# Patient Record
Sex: MALE | Race: WHITE | NOT HISPANIC OR LATINO | Employment: OTHER | URBAN - METROPOLITAN AREA
[De-identification: names, ages, dates, MRNs, and addresses within clinical notes are randomized per-mention and may not be internally consistent; named-entity substitution may affect disease eponyms.]

---

## 2020-07-13 ENCOUNTER — APPOINTMENT (OUTPATIENT)
Dept: ULTRASOUND IMAGING | Facility: HOSPITAL | Age: 80
End: 2020-07-13

## 2021-02-08 ENCOUNTER — HOSPITAL ENCOUNTER (OUTPATIENT)
Dept: VACCINE CLINIC | Facility: HOSPITAL | Age: 81
Discharge: HOME | End: 2021-02-08
Attending: INTERNAL MEDICINE

## 2021-03-11 ENCOUNTER — HOSPITAL ENCOUNTER (OUTPATIENT)
Dept: VACCINE CLINIC | Facility: HOSPITAL | Age: 81
Discharge: HOME | End: 2021-03-11
Attending: INTERNAL MEDICINE

## 2021-06-02 ENCOUNTER — HOSPITAL ENCOUNTER (OUTPATIENT)
Dept: OTHER | Facility: HOSPITAL | Age: 81
Discharge: HOME | End: 2021-06-02

## 2021-07-14 ENCOUNTER — HOSPITAL ENCOUNTER (EMERGENCY)
Facility: HOSPITAL | Age: 81
Discharge: HOME | End: 2021-07-14
Attending: EMERGENCY MEDICINE | Admitting: EMERGENCY MEDICINE

## 2021-07-14 VITALS
TEMPERATURE: 97.7 F | OXYGEN SATURATION: 99 % | HEIGHT: 64 IN | HEART RATE: 70 BPM | WEIGHT: 149.91 LBS | BODY MASS INDEX: 25.59 KG/M2 | DIASTOLIC BLOOD PRESSURE: 52 MMHG | RESPIRATION RATE: 20 BRPM | SYSTOLIC BLOOD PRESSURE: 149 MMHG

## 2021-07-14 DIAGNOSIS — E83.42 HYPOMAGNESEMIA: Primary | ICD-10-CM

## 2021-07-14 LAB
ALBUMIN SERPL-MCNC: 4.1 G/DL (ref 3.5–5.2)
ALBUMIN/GLOB SERPL: 1.7 G/DL
ALP SERPL-CCNC: 91 U/L (ref 39–117)
ALT SERPL W P-5'-P-CCNC: 14 U/L (ref 1–41)
ANION GAP SERPL CALCULATED.3IONS-SCNC: 12.6 MMOL/L (ref 5–15)
AST SERPL-CCNC: 15 U/L (ref 1–40)
BASOPHILS # BLD AUTO: 0.07 10*3/MM3 (ref 0–0.2)
BASOPHILS NFR BLD AUTO: 0.6 % (ref 0–1.5)
BILIRUB SERPL-MCNC: 0.3 MG/DL (ref 0–1.2)
BUN SERPL-MCNC: 30 MG/DL (ref 8–23)
BUN/CREAT SERPL: 11.2 (ref 7–25)
CALCIUM SPEC-SCNC: 8.5 MG/DL (ref 8.6–10.5)
CHLORIDE SERPL-SCNC: 105 MMOL/L (ref 98–107)
CO2 SERPL-SCNC: 23.4 MMOL/L (ref 22–29)
CREAT SERPL-MCNC: 2.68 MG/DL (ref 0.76–1.27)
DEPRECATED RDW RBC AUTO: 44 FL (ref 37–54)
EOSINOPHIL # BLD AUTO: 0.44 10*3/MM3 (ref 0–0.4)
EOSINOPHIL NFR BLD AUTO: 4 % (ref 0.3–6.2)
ERYTHROCYTE [DISTWIDTH] IN BLOOD BY AUTOMATED COUNT: 12.8 % (ref 12.3–15.4)
GFR SERPL CREATININE-BSD FRML MDRD: 23 ML/MIN/1.73
GLOBULIN UR ELPH-MCNC: 2.4 GM/DL
GLUCOSE SERPL-MCNC: 97 MG/DL (ref 65–99)
HCT VFR BLD AUTO: 39.1 % (ref 37.5–51)
HGB BLD-MCNC: 12.8 G/DL (ref 13–17.7)
HOLD SPECIMEN: NORMAL
HOLD SPECIMEN: NORMAL
IMM GRANULOCYTES # BLD AUTO: 0.06 10*3/MM3 (ref 0–0.05)
IMM GRANULOCYTES NFR BLD AUTO: 0.5 % (ref 0–0.5)
LYMPHOCYTES # BLD AUTO: 1.59 10*3/MM3 (ref 0.7–3.1)
LYMPHOCYTES NFR BLD AUTO: 14.5 % (ref 19.6–45.3)
MAGNESIUM SERPL-MCNC: 1.1 MG/DL (ref 1.6–2.4)
MCH RBC QN AUTO: 31.1 PG (ref 26.6–33)
MCHC RBC AUTO-ENTMCNC: 32.7 G/DL (ref 31.5–35.7)
MCV RBC AUTO: 94.9 FL (ref 79–97)
MONOCYTES # BLD AUTO: 0.77 10*3/MM3 (ref 0.1–0.9)
MONOCYTES NFR BLD AUTO: 7 % (ref 5–12)
NEUTROPHILS NFR BLD AUTO: 73.4 % (ref 42.7–76)
NEUTROPHILS NFR BLD AUTO: 8.03 10*3/MM3 (ref 1.7–7)
NRBC BLD AUTO-RTO: 0 /100 WBC (ref 0–0.2)
PLATELET # BLD AUTO: 330 10*3/MM3 (ref 140–450)
PMV BLD AUTO: 9.7 FL (ref 6–12)
POTASSIUM SERPL-SCNC: 4.4 MMOL/L (ref 3.5–5.2)
PROT SERPL-MCNC: 6.5 G/DL (ref 6–8.5)
QT INTERVAL: 429 MS
RBC # BLD AUTO: 4.12 10*6/MM3 (ref 4.14–5.8)
SODIUM SERPL-SCNC: 141 MMOL/L (ref 136–145)
WBC # BLD AUTO: 10.96 10*3/MM3 (ref 3.4–10.8)
WHOLE BLOOD HOLD SPECIMEN: NORMAL

## 2021-07-14 PROCEDURE — 36415 COLL VENOUS BLD VENIPUNCTURE: CPT

## 2021-07-14 PROCEDURE — 83735 ASSAY OF MAGNESIUM: CPT | Performed by: EMERGENCY MEDICINE

## 2021-07-14 PROCEDURE — 85025 COMPLETE CBC W/AUTO DIFF WBC: CPT

## 2021-07-14 PROCEDURE — 93005 ELECTROCARDIOGRAM TRACING: CPT

## 2021-07-14 PROCEDURE — 99283 EMERGENCY DEPT VISIT LOW MDM: CPT

## 2021-07-14 PROCEDURE — 25010000002 MAGNESIUM SULFATE IN D5W 1G/100ML (PREMIX) 1-5 GM/100ML-% SOLUTION: Performed by: EMERGENCY MEDICINE

## 2021-07-14 PROCEDURE — 80053 COMPREHEN METABOLIC PANEL: CPT

## 2021-07-14 PROCEDURE — 93010 ELECTROCARDIOGRAM REPORT: CPT | Performed by: SPECIALIST

## 2021-07-14 PROCEDURE — 96365 THER/PROPH/DIAG IV INF INIT: CPT

## 2021-07-14 RX ORDER — MAGNESIUM OXIDE 400 MG/1
400 TABLET ORAL 2 TIMES DAILY
Qty: 30 TABLET | Refills: 0 | Status: SHIPPED | OUTPATIENT
Start: 2021-07-14 | End: 2022-05-02 | Stop reason: SDUPTHER

## 2021-07-14 RX ORDER — OMEPRAZOLE 40 MG/1
40 CAPSULE, DELAYED RELEASE ORAL DAILY
COMMUNITY
Start: 2021-07-02

## 2021-07-14 RX ORDER — MAGNESIUM SULFATE 1 G/100ML
1 INJECTION INTRAVENOUS
Status: COMPLETED | OUTPATIENT
Start: 2021-07-14 | End: 2021-07-14

## 2021-07-14 RX ORDER — LISINOPRIL AND HYDROCHLOROTHIAZIDE 20; 12.5 MG/1; MG/1
1 TABLET ORAL DAILY
COMMUNITY

## 2021-07-14 RX ADMIN — MAGNESIUM SULFATE 1 G: 1 INJECTION INTRAVENOUS at 12:40

## 2021-07-14 RX ADMIN — MAGNESIUM SULFATE 1 G: 1 INJECTION INTRAVENOUS at 11:45

## 2021-07-14 NOTE — DISCHARGE INSTRUCTIONS
All of your blood work today looked okay and showed her usual renal function, but your magnesium levels were quite low measuring only 1.1.    You were given 2 bags of IV magnesium sulfate in the ED today.    You still need to take oral magnesium oxide pills twice a day and call your doctors office for a follow-up appointment to have your magnesium level rechecked within the next week or so.

## 2021-07-14 NOTE — ED PROVIDER NOTES
"Time: 10:50 AM EDT  Arrived by: Car  Chief Complaint: Abnormal labs  History provided by: Patient and family  History is limited by: N/A    History of Present Illness:    Huy Reddy is a 80 y.o. male who presents to the emergency department today with complaints of low magnesium. The patient's family reports that his magnesium has been progressively dropping over the past 3-4 weeks. They state that he received blood work yesterday and received a call from his provider (\"Gi\") that he needed to come to the ED due to low magnesium.    The patient's family notes that he does have generalized weakness and ambulates with a walker. The patient denies any muscle cramping, fever, chest pain, shortness of breath, or urinary problems, though he does have mild chronic diarrhea. He has a history of chronic kidney disease and hypertension. He denies drinking excessive amounts of alcohol daily but does drink occasionally. He denies being on a diuretic. There are no other acute complaints at this time.      History provided by:  Patient and relative   used: No    Abnormal Lab  Location:  N/A  Quality:  Low magnesium  Severity:  Moderate  Onset quality:  Sudden  Duration:  4 weeks  Timing:  Constant  Progression:  Worsening  Chronicity:  Recurrent  Context:  Patient's magnesium has been dropping over the past 3-4 weeks.  Relieved by:  Nothing  Worsened by:  Nothing  Ineffective treatments:  N/A  Associated symptoms: diarrhea (chronic)    Associated symptoms: no chest pain, no cough, no fever, no myalgias, no rash, no shortness of breath and no vomiting    Risk factors:  History of hypertension and chronic kidney disease.          Similar Symptoms Previously: Yes (over the past few months)  Recently seen: Patient has been seen for labs multiple times over the past several months. He has not been seen in this Ed previously.      Patient Care Team  Primary Care Provider: \"Gi\" (patient cannot recall last " "name)    Past Medical History:     Allergies   Allergen Reactions   • Aspirin Irritability      thins blood     Past Medical History:   Diagnosis Date   • Hyperlipidemia    • Hypertension    • Renal disorder      Past Surgical History:   Procedure Laterality Date   • TONSILLECTOMY       History reviewed. No pertinent family history.    Home Medications:  Prior to Admission medications    Not on File        Social History:   PT  reports that he quit smoking about 2 years ago. He does not have any smokeless tobacco history on file. He reports current alcohol use. He reports that he does not use drugs.    Record Review:  I have reviewed the patient's records in [facility].     Review of Systems  Review of Systems   Constitutional: Negative for chills and fever.   HENT: Negative for nosebleeds.    Eyes: Negative for redness.   Respiratory: Negative for cough and shortness of breath.    Cardiovascular: Negative for chest pain.   Gastrointestinal: Positive for diarrhea (chronic). Negative for vomiting.   Genitourinary: Negative for difficulty urinating, dysuria, frequency and hematuria.   Musculoskeletal: Negative for back pain, myalgias and neck pain.   Skin: Negative for rash.   Neurological: Positive for weakness (generalized; patient ambulates with a walker secondary to his weakness). Negative for seizures.   All other systems reviewed and are negative.       Physical Exam  /52   Pulse 70   Temp 97.7 °F (36.5 °C) (Oral)   Resp 20   Ht 162.6 cm (64\")   Wt 68 kg (149 lb 14.6 oz)   SpO2 99%   BMI 25.73 kg/m²     Physical Exam  Vitals and nursing note reviewed.   Constitutional:       General: He is not in acute distress.  HENT:      Head: Normocephalic and atraumatic.      Nose: Nose normal.      Mouth/Throat:      Mouth: Mucous membranes are moist.   Eyes:      General: No scleral icterus.  Cardiovascular:      Rate and Rhythm: Normal rate and regular rhythm.      Heart sounds: Normal heart sounds. No murmur " "heard.     Pulmonary:      Effort: No respiratory distress.      Breath sounds: Normal breath sounds.   Abdominal:      Palpations: Abdomen is soft.      Tenderness: There is no abdominal tenderness.      Hernia: No hernia is present.   Musculoskeletal:         General: No tenderness. Normal range of motion.      Cervical back: Normal range of motion and neck supple. No tenderness.      Right lower leg: No edema.      Left lower leg: No edema.   Skin:     General: Skin is warm and dry.   Neurological:      General: No focal deficit present.      Mental Status: He is alert.      Sensory: No sensory deficit.      Motor: No weakness.   Psychiatric:         Behavior: Behavior normal.                  ED Course  /52   Pulse 70   Temp 97.7 °F (36.5 °C) (Oral)   Resp 20   Ht 162.6 cm (64\")   Wt 68 kg (149 lb 14.6 oz)   SpO2 99%   BMI 25.73 kg/m²   Results for orders placed or performed during the hospital encounter of 07/14/21   Comprehensive Metabolic Panel    Specimen: Blood   Result Value Ref Range    Glucose 97 65 - 99 mg/dL    BUN 30 (H) 8 - 23 mg/dL    Creatinine 2.68 (H) 0.76 - 1.27 mg/dL    Sodium 141 136 - 145 mmol/L    Potassium 4.4 3.5 - 5.2 mmol/L    Chloride 105 98 - 107 mmol/L    CO2 23.4 22.0 - 29.0 mmol/L    Calcium 8.5 (L) 8.6 - 10.5 mg/dL    Total Protein 6.5 6.0 - 8.5 g/dL    Albumin 4.10 3.50 - 5.20 g/dL    ALT (SGPT) 14 1 - 41 U/L    AST (SGOT) 15 1 - 40 U/L    Alkaline Phosphatase 91 39 - 117 U/L    Total Bilirubin 0.3 0.0 - 1.2 mg/dL    eGFR Non African Amer 23 (L) >60 mL/min/1.73    Globulin 2.4 gm/dL    A/G Ratio 1.7 g/dL    BUN/Creatinine Ratio 11.2 7.0 - 25.0    Anion Gap 12.6 5.0 - 15.0 mmol/L   CBC Auto Differential    Specimen: Blood   Result Value Ref Range    WBC 10.96 (H) 3.40 - 10.80 10*3/mm3    RBC 4.12 (L) 4.14 - 5.80 10*6/mm3    Hemoglobin 12.8 (L) 13.0 - 17.7 g/dL    Hematocrit 39.1 37.5 - 51.0 %    MCV 94.9 79.0 - 97.0 fL    MCH 31.1 26.6 - 33.0 pg    MCHC 32.7 31.5 - " 35.7 g/dL    RDW 12.8 12.3 - 15.4 %    RDW-SD 44.0 37.0 - 54.0 fl    MPV 9.7 6.0 - 12.0 fL    Platelets 330 140 - 450 10*3/mm3    Neutrophil % 73.4 42.7 - 76.0 %    Lymphocyte % 14.5 (L) 19.6 - 45.3 %    Monocyte % 7.0 5.0 - 12.0 %    Eosinophil % 4.0 0.3 - 6.2 %    Basophil % 0.6 0.0 - 1.5 %    Immature Grans % 0.5 0.0 - 0.5 %    Neutrophils, Absolute 8.03 (H) 1.70 - 7.00 10*3/mm3    Lymphocytes, Absolute 1.59 0.70 - 3.10 10*3/mm3    Monocytes, Absolute 0.77 0.10 - 0.90 10*3/mm3    Eosinophils, Absolute 0.44 (H) 0.00 - 0.40 10*3/mm3    Basophils, Absolute 0.07 0.00 - 0.20 10*3/mm3    Immature Grans, Absolute 0.06 (H) 0.00 - 0.05 10*3/mm3    nRBC 0.0 0.0 - 0.2 /100 WBC   Magnesium    Specimen: Blood   Result Value Ref Range    Magnesium 1.1 (L) 1.6 - 2.4 mg/dL   ECG 12 Lead   Result Value Ref Range    QT Interval 429 ms   Green Top (Gel)   Result Value Ref Range    Extra Tube Hold for add-ons.    Lavender Top   Result Value Ref Range    Extra Tube hold for add-on    Gold Top - SST   Result Value Ref Range    Extra Tube Hold for add-ons.      Medications   magnesium sulfate in D5W 1g/100mL (PREMIX) (0 g Intravenous Stopped 7/14/21 1358)     No results found.    Procedures/EKGs:  Procedures    EKG:    Rhythm: Normal sinus rhythm  Rate: 63  Normal intervals.  QRS: Q waves present in leads II, III, and aVF.  ST Segment: Normal    No acute ischemia.    EKG Comparison: No old for comparison.    Interpreted by me.          Medical Decision Making:                     [facility]     This patient is a pleasant healthy-appearing 80-year-old male who was sent by his doctor's office today for abnormally low magnesium levels based off of blood work drawn a couple days ago at his doctor's office.    He does state he has generalized muscular weakness but not having any muscle spasms or cramps lately, and also denies any palpitations.    He denies any new vomiting or diarrhea or any causes for dehydration and denies any new medications  lately.    His magnesium level is low at measuring 1.1 today.    I started him on a couple doses of IV magnesium sulfate here in the ED.    I observed him on cardiac monitoring and twelve-lead EKG shows no obvious signs of electrolyte abnormality.    He has stable vital signs and looks clinically stable to be discharged home at this time and I will start him on oral magnesium oxide 400 mg twice daily and have him follow-up with his PCP for serum magnesium level recheck in the next week.          Final diagnoses:   Hypomagnesemia        Disposition:  ED Disposition     ED Disposition Condition Comment    Discharge Stable           Documentation assistance provided by Sahara Drew acting as scribe for No att. providers found. Information recorded by the scribe was done at my direction and has been verified and validated by me.        Sahara Drew  07/14/21 1059       Sahara Drew  07/14/21 1105       Sahara Drew  07/14/21 1140       Sahara Drew  07/14/21 140       Willy Shah MD  07/14/21 4411

## 2021-07-14 NOTE — ED NOTES
Provided drinks to patient and wife. Updated with amount of time mag takes to infuse.       Valentine Gonzales RN  07/14/21 5838

## 2021-07-27 ENCOUNTER — TRANSCRIBE ORDERS (OUTPATIENT)
Dept: ADMINISTRATIVE | Facility: HOSPITAL | Age: 81
End: 2021-07-27

## 2021-07-27 DIAGNOSIS — I73.9 INTERMITTENT CLAUDICATION (HCC): ICD-10-CM

## 2021-07-27 DIAGNOSIS — I73.9 PERIPHERAL VASCULAR DISEASE, UNSPECIFIED (HCC): Primary | ICD-10-CM

## 2021-07-27 DIAGNOSIS — I73.9 PERIPHERAL VASCULAR DISEASE (HCC): ICD-10-CM

## 2022-04-14 ENCOUNTER — APPOINTMENT (OUTPATIENT)
Dept: CT IMAGING | Facility: HOSPITAL | Age: 82
End: 2022-04-14

## 2022-04-14 ENCOUNTER — HOSPITAL ENCOUNTER (EMERGENCY)
Facility: HOSPITAL | Age: 82
Discharge: HOME | End: 2022-04-14
Attending: EMERGENCY MEDICINE | Admitting: EMERGENCY MEDICINE

## 2022-04-14 ENCOUNTER — APPOINTMENT (OUTPATIENT)
Dept: GENERAL RADIOLOGY | Facility: HOSPITAL | Age: 82
End: 2022-04-14

## 2022-04-14 VITALS
WEIGHT: 150 LBS | TEMPERATURE: 98.4 F | DIASTOLIC BLOOD PRESSURE: 52 MMHG | HEART RATE: 52 BPM | SYSTOLIC BLOOD PRESSURE: 142 MMHG | OXYGEN SATURATION: 100 % | RESPIRATION RATE: 16 BRPM | BODY MASS INDEX: 24.11 KG/M2 | HEIGHT: 66 IN

## 2022-04-14 DIAGNOSIS — S70.02XA CONTUSION OF LEFT HIP, INITIAL ENCOUNTER: Primary | ICD-10-CM
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
## 2022-04-14 PROCEDURE — 99283 EMERGENCY DEPT VISIT LOW MDM: CPT

## 2022-04-14 PROCEDURE — 73502 X-RAY EXAM HIP UNI 2-3 VIEWS: CPT

## 2022-04-14 PROCEDURE — 72192 CT PELVIS W/O DYE: CPT

## 2022-04-14 RX ORDER — HYDROCODONE BITARTRATE AND ACETAMINOPHEN 5; 325 MG/1; MG/1
1 TABLET ORAL EVERY 6 HOURS PRN
Qty: 15 TABLET | Refills: 0 | Status: SHIPPED | OUTPATIENT
Start: 2022-04-14 | End: 2023-01-18

## 2022-04-14 RX ORDER — HYDROCODONE BITARTRATE AND ACETAMINOPHEN 5; 325 MG/1; MG/1
1 TABLET ORAL EVERY 6 HOURS PRN
Status: DISCONTINUED | OUTPATIENT
Start: 2022-04-14 | End: 2022-04-15 | Stop reason: HOSPADM

## 2022-04-14 RX ADMIN — HYDROCODONE BITARTRATE AND ACETAMINOPHEN 1 TABLET: 5; 325 TABLET ORAL at 19:49

## 2022-04-14 NOTE — ED PROVIDER NOTES
Subjective   Huy Reddy is a 81 y.o. male that presents to the ED via private vehicle accompanied by family for FALL that occurred today around 1430. Pt states that he was breaking wood with his foot when his foot slipped and he fell, landing on his left hip. He c/o moderate pain to the left hip and reports being unable to ambulate and bear weight on the left leg since. No other injuries including head injury are reported. No back pain, neck pain or LOC.     Pt has no other acute complaints at this time.       History provided by:  Patient      Review of Systems   Constitutional: Negative for chills, diaphoresis and fever.   HENT: Negative for ear discharge and nosebleeds.    Eyes: Negative for photophobia.   Respiratory: Negative for shortness of breath.    Cardiovascular: Negative for chest pain.   Gastrointestinal: Negative for diarrhea, nausea and vomiting.   Genitourinary: Negative for dysuria.   Musculoskeletal: Negative for back pain and neck pain.        Left hip pain secondary to fall.    Skin: Negative for rash.   Neurological: Negative for headaches.   All other systems reviewed and are negative.      Past Medical History:   Diagnosis Date   • Hyperlipidemia    • Hypertension    • Renal disorder        Allergies   Allergen Reactions   • Aspirin Irritability      thins blood       Past Surgical History:   Procedure Laterality Date   • TONSILLECTOMY         History reviewed. No pertinent family history.    Social History     Socioeconomic History   • Marital status:    Tobacco Use   • Smoking status: Former Smoker     Quit date: 2019     Years since quittin.7   • Smokeless tobacco: Never Used   Substance and Sexual Activity   • Alcohol use: Yes   • Drug use: Never         Objective   Physical Exam  Vitals and nursing note reviewed.   Constitutional:       General: He is not in acute distress.     Appearance: Normal appearance. He is well-developed.      Comments: Patient appears  well-nourished.     HENT:      Head: Normocephalic and atraumatic.      Right Ear: Tympanic membrane normal. Tympanic membrane is not erythematous.      Left Ear: Tympanic membrane normal. Tympanic membrane is not erythematous.      Nose: Nose normal.      Right Nostril: No epistaxis.      Left Nostril: No epistaxis.      Comments: No evidence of trauma.      Mouth/Throat:      Mouth: Mucous membranes are moist.      Pharynx: Oropharynx is clear. Uvula midline. No posterior oropharyngeal erythema.   Eyes:      Extraocular Movements: Extraocular movements intact.      Conjunctiva/sclera: Conjunctivae normal.      Pupils: Pupils are equal, round, and reactive to light.   Neck:      Comments: Trachea midline. No meningeal signs.   Cardiovascular:      Rate and Rhythm: Normal rate and regular rhythm.      Pulses: Normal pulses.      Heart sounds: Normal heart sounds. No murmur heard.    No friction rub. No gallop.   Pulmonary:      Effort: Pulmonary effort is normal. No respiratory distress.      Breath sounds: Normal breath sounds.   Abdominal:      General: Bowel sounds are normal.      Palpations: Abdomen is soft. There is no mass.      Tenderness: There is no abdominal tenderness. There is no right CVA tenderness, left CVA tenderness, guarding or rebound.      Comments: There is no rigidity. There are no pulsatile masses.     Musculoskeletal:      Cervical back: Neck supple.      Comments: Back - Spine is straight and midline.      Tenderness with rotation of LLE and palpation of left lateral hip joint. In tact pulses distally. No ecchymosis.     Pt does have a small wound that is 1 cm in diameter to the mid thoracic area from where he states he used a heating pad with no drainage or cellulitis.    Lymphadenopathy:      Cervical: No cervical adenopathy.   Skin:     General: Skin is warm and dry.      Findings: No petechiae or rash.      Comments: There are no purpura lesions noted.  There are no vesicular lesions  noted.   Neurological:      General: No focal deficit present.      Mental Status: He is alert and oriented to person, place, and time.      Cranial Nerves: Cranial nerves are intact.      Sensory: Sensation is intact. No sensory deficit.      Motor: Motor function is intact. No weakness.      Comments: Cerebellar function was normal.         Procedures         ED Course        The patient was seen and evaluated in the ED by me.  The above history and physical examination was performed as document.  Diagnostic data was obtained.  Results reviewed.  Discussed with the patient and his family.  Both the x-ray and CT scans do not show any evidence of acute fractures.  Patient most likely has contusion of the left hip but also explained I cannot rule out a muscle tear or injury.  Patient stable for discharge home.  I will send him home with a prescription of hydrocodone to take sparingly as needed for pain.  I have also contacted our  to arrange for home health/physical therapy evaluation.                                    MDM    Final diagnoses:   Contusion of left hip, initial encounter   Fall, initial encounter       Documentation assistance provided by carole Luna.  Information recorded by the carole was done at my direction and has been verified and validated by me.     Priya Luna  04/14/22 1927       Priya Luna  04/14/22 1950       Tesfaye Bowens DO  04/14/22 [id number]

## 2022-04-15 NOTE — DISCHARGE INSTRUCTIONS
Activity as tolerated.  Use assistance with ambulation.  Take the pain medication as prescribed but use sparingly.  Also recommend taking a stool softener if you are taking the prescription pain medication.  Otherwise you can use Tylenol or Motrin for pain.  Cold compresses to affected area.  Apply for 20 to 30 minutes 3-5 times per day.  Do not apply ice directly to the skin.   will arrange for in-home health PT evaluation.  Follow your primary care provider in 1 week.  Return to the ER for any change or worsening pain or any other concerns issues that may arise.

## 2022-05-02 ENCOUNTER — HOSPITAL ENCOUNTER (EMERGENCY)
Facility: HOSPITAL | Age: 82
Discharge: HOME | End: 2022-05-02
Attending: EMERGENCY MEDICINE | Admitting: EMERGENCY MEDICINE

## 2022-05-02 VITALS
OXYGEN SATURATION: 99 % | HEART RATE: 62 BPM | WEIGHT: 152.34 LBS | SYSTOLIC BLOOD PRESSURE: 164 MMHG | HEIGHT: 65 IN | BODY MASS INDEX: 25.38 KG/M2 | DIASTOLIC BLOOD PRESSURE: 58 MMHG | RESPIRATION RATE: 20 BRPM | TEMPERATURE: 97.5 F

## 2022-05-02 DIAGNOSIS — E83.42 HYPOMAGNESEMIA: ICD-10-CM

## 2022-05-02 DIAGNOSIS — N18.5 STAGE 5 CHRONIC KIDNEY DISEASE NOT ON CHRONIC DIALYSIS: Primary | ICD-10-CM

## 2022-05-02 LAB
ALBUMIN SERPL-MCNC: 4 G/DL (ref 3.5–5.2)
ALBUMIN/GLOB SERPL: 1.7 G/DL
ALP SERPL-CCNC: 93 U/L (ref 39–117)
ALT SERPL W P-5'-P-CCNC: 33 U/L (ref 1–41)
ANION GAP SERPL CALCULATED.3IONS-SCNC: 13.9 MMOL/L (ref 5–15)
AST SERPL-CCNC: 18 U/L (ref 1–40)
BACTERIA UR QL AUTO: ABNORMAL /HPF
BASOPHILS # BLD AUTO: 0.05 10*3/MM3 (ref 0–0.2)
BASOPHILS NFR BLD AUTO: 0.3 % (ref 0–1.5)
BILIRUB SERPL-MCNC: 0.2 MG/DL (ref 0–1.2)
BILIRUB UR QL STRIP: NEGATIVE
BUN SERPL-MCNC: 56 MG/DL (ref 8–23)
BUN/CREAT SERPL: 20.2 (ref 7–25)
CALCIUM SPEC-SCNC: 8.6 MG/DL (ref 8.6–10.5)
CHLORIDE SERPL-SCNC: 104 MMOL/L (ref 98–107)
CLARITY UR: CLEAR
CO2 SERPL-SCNC: 23.1 MMOL/L (ref 22–29)
COLOR UR: YELLOW
CREAT SERPL-MCNC: 2.77 MG/DL (ref 0.76–1.27)
DEPRECATED RDW RBC AUTO: 44.9 FL (ref 37–54)
EGFRCR SERPLBLD CKD-EPI 2021: 22.4 ML/MIN/1.73
EOSINOPHIL # BLD AUTO: 0 10*3/MM3 (ref 0–0.4)
EOSINOPHIL NFR BLD AUTO: 0 % (ref 0.3–6.2)
ERYTHROCYTE [DISTWIDTH] IN BLOOD BY AUTOMATED COUNT: 12.9 % (ref 12.3–15.4)
GLOBULIN UR ELPH-MCNC: 2.4 GM/DL
GLUCOSE SERPL-MCNC: 129 MG/DL (ref 65–99)
GLUCOSE UR STRIP-MCNC: NEGATIVE MG/DL
HCT VFR BLD AUTO: 38.2 % (ref 37.5–51)
HGB BLD-MCNC: 12.4 G/DL (ref 13–17.7)
HGB UR QL STRIP.AUTO: NEGATIVE
HYALINE CASTS UR QL AUTO: ABNORMAL /LPF
IMM GRANULOCYTES # BLD AUTO: 0.3 10*3/MM3 (ref 0–0.05)
IMM GRANULOCYTES NFR BLD AUTO: 1.5 % (ref 0–0.5)
KETONES UR QL STRIP: NEGATIVE
LEUKOCYTE ESTERASE UR QL STRIP.AUTO: NEGATIVE
LYMPHOCYTES # BLD AUTO: 0.96 10*3/MM3 (ref 0.7–3.1)
LYMPHOCYTES NFR BLD AUTO: 4.9 % (ref 19.6–45.3)
MAGNESIUM SERPL-MCNC: 1.3 MG/DL (ref 1.6–2.4)
MCH RBC QN AUTO: 30.7 PG (ref 26.6–33)
MCHC RBC AUTO-ENTMCNC: 32.5 G/DL (ref 31.5–35.7)
MCV RBC AUTO: 94.6 FL (ref 79–97)
MONOCYTES # BLD AUTO: 1.36 10*3/MM3 (ref 0.1–0.9)
MONOCYTES NFR BLD AUTO: 6.9 % (ref 5–12)
NEUTROPHILS NFR BLD AUTO: 16.9 10*3/MM3 (ref 1.7–7)
NEUTROPHILS NFR BLD AUTO: 86.4 % (ref 42.7–76)
NITRITE UR QL STRIP: NEGATIVE
NRBC BLD AUTO-RTO: 0 /100 WBC (ref 0–0.2)
PH UR STRIP.AUTO: 5.5 [PH] (ref 5–8)
PLATELET # BLD AUTO: 343 10*3/MM3 (ref 140–450)
PMV BLD AUTO: 10.3 FL (ref 6–12)
POTASSIUM SERPL-SCNC: 4.4 MMOL/L (ref 3.5–5.2)
PROT SERPL-MCNC: 6.4 G/DL (ref 6–8.5)
PROT UR QL STRIP: ABNORMAL
RBC # BLD AUTO: 4.04 10*6/MM3 (ref 4.14–5.8)
RBC # UR STRIP: ABNORMAL /HPF
REF LAB TEST METHOD: ABNORMAL
SODIUM SERPL-SCNC: 141 MMOL/L (ref 136–145)
SP GR UR STRIP: 1.01 (ref 1–1.03)
SQUAMOUS #/AREA URNS HPF: ABNORMAL /HPF
UROBILINOGEN UR QL STRIP: ABNORMAL
WBC # UR STRIP: ABNORMAL /HPF
WBC NRBC COR # BLD: 19.57 10*3/MM3 (ref 3.4–10.8)

## 2022-05-02 PROCEDURE — 99283 EMERGENCY DEPT VISIT LOW MDM: CPT

## 2022-05-02 PROCEDURE — 85025 COMPLETE CBC W/AUTO DIFF WBC: CPT

## 2022-05-02 PROCEDURE — 83735 ASSAY OF MAGNESIUM: CPT | Performed by: EMERGENCY MEDICINE

## 2022-05-02 PROCEDURE — 81001 URINALYSIS AUTO W/SCOPE: CPT | Performed by: EMERGENCY MEDICINE

## 2022-05-02 PROCEDURE — 36415 COLL VENOUS BLD VENIPUNCTURE: CPT

## 2022-05-02 PROCEDURE — 80053 COMPREHEN METABOLIC PANEL: CPT

## 2022-05-02 PROCEDURE — 51798 US URINE CAPACITY MEASURE: CPT

## 2022-05-02 RX ORDER — MAGNESIUM OXIDE 400 MG/1
400 TABLET ORAL 2 TIMES DAILY
Qty: 20 TABLET | Refills: 0 | Status: SHIPPED | OUTPATIENT
Start: 2022-05-02 | End: 2023-02-23

## 2022-05-02 RX ORDER — METHYLPREDNISOLONE 4 MG/1
4 TABLET ORAL DAILY
COMMUNITY
End: 2023-01-18

## 2022-05-02 NOTE — ED PROVIDER NOTES
Time: 6:07 PM EDT  Arrived by: private car  Chief Complaint: Abnormal Labs  History provided by: Pt  History is limited by: N/A     History of Present Illness:  Patient is a 81 y.o. male that presents to the emergency department with complaints of abnormal labs today. Pt states that he has 3 times urination in night and today his doctor office ordered labs and after screening his Labs creatinine level are 3.6 mg/dl and doctor office advised him to visit ER for further evaluation. Pt also reports recently he has ankle pain for that he visited to orthopedic office where he started on Prednisolone as advised. Pt has h/o CKD and regular follow up with nephrologist.    Pt denies any fever, flank pain, hematuria, abd pain, diarrhea, vomiting and nausea.           History provided by:  Patient   used: No    Other  Location:  Abnormmal labs   Severity:  Mild  Onset quality:  Sudden  Duration: today.  Timing:  Constant  Progression:  Unchanged  Chronicity:  New  Associated symptoms: no abdominal pain, no chest pain, no diarrhea, no fever, no headaches, no myalgias, no nausea, no rash, no shortness of breath and no vomiting        Similar Symptoms Previously: No  Recently seen: 4/27/2022 with orthopaedic()      Patient Care Team  Primary Care Provider: Allison Villafana MD    Past Medical History:     Allergies   Allergen Reactions   • Aspirin Irritability      thins blood     Past Medical History:   Diagnosis Date   • Hyperlipidemia    • Hypertension    • Renal disorder      Past Surgical History:   Procedure Laterality Date   • TONSILLECTOMY       History reviewed. No pertinent family history.    Home Medications:  Prior to Admission medications    Medication Sig Start Date End Date Taking? Authorizing Provider   HYDROcodone-acetaminophen (NORCO) 5-325 MG per tablet Take 1 tablet by mouth Every 6 (Six) Hours As Needed for Moderate Pain . 4/14/22   Tesfaye Bowens, DO   lisinopril-hydrochlorothiazide  "(PRINZIDE,ZESTORETIC) 20-12.5 MG per tablet Take 1 tablet by mouth Daily.    ProviderCholo MD   magnesium oxide (MAG-OX) 400 MG tablet Take 1 tablet by mouth 2 (Two) Times a Day. 7/14/21   Willy Shah MD   methylPREDNISolone (MEDROL) 4 MG tablet Take 4 mg by mouth Daily.    Cholo Jaime MD   omeprazole (priLOSEC) 20 MG capsule TAKE 1 CAPSULE EVERY DAY DO NOT CRUSH OR CHEW 7/2/21   ProviderCholo MD        Social History:   Social History     Tobacco Use   • Smoking status: Never Smoker   • Smokeless tobacco: Never Used   Vaping Use   • Vaping Use: Never used   Substance Use Topics   • Alcohol use: Not Currently   • Drug use: Never     Recent travel: no     Review of Systems:  Review of Systems   Constitutional: Negative for chills, diaphoresis and fever.   HENT: Negative for ear discharge and nosebleeds.    Eyes: Negative for photophobia.   Respiratory: Negative for shortness of breath.    Cardiovascular: Negative for chest pain.   Gastrointestinal: Negative for abdominal pain, diarrhea, nausea and vomiting.   Genitourinary: Negative for dysuria.   Musculoskeletal: Negative for back pain, myalgias and neck pain.   Skin: Negative for rash.   Neurological: Negative for headaches.        Physical Exam:  /58   Pulse 62   Temp 97.5 °F (36.4 °C) (Oral)   Resp 20   Ht 165.1 cm (65\")   Wt 69.1 kg (152 lb 5.4 oz)   SpO2 99%   BMI 25.35 kg/m²     Physical Exam  Vitals and nursing note reviewed.   Constitutional:       General: He is not in acute distress.  HENT:      Head: Normocephalic and atraumatic.      Nose: Nose normal.      Mouth/Throat:      Mouth: Mucous membranes are moist.   Eyes:      General: No scleral icterus.  Cardiovascular:      Rate and Rhythm: Normal rate and regular rhythm.      Pulses: Normal pulses.      Heart sounds: Normal heart sounds. No murmur heard.  Pulmonary:      Effort: No respiratory distress.      Breath sounds: Normal breath sounds.   Abdominal:      " Palpations: Abdomen is soft.      Tenderness: There is no abdominal tenderness.   Musculoskeletal:         General: Normal range of motion.      Cervical back: Normal range of motion and neck supple.      Right lower leg: No edema.      Left lower leg: No edema.   Skin:     General: Skin is warm and dry.   Neurological:      Mental Status: He is alert. Mental status is at baseline.   Psychiatric:         Behavior: Behavior normal.                Medications in the Emergency Department:  Medications - No data to display     Labs  Lab Results (last 24 hours)     Procedure Component Value Units Date/Time    CBC & Differential [[id number]]  (Abnormal) Collected: 05/02/22 1616    Specimen: Blood Updated: 05/02/22 1703    Narrative:      The following orders were created for panel order CBC & Differential.  Procedure                               Abnormality         Status                     ---------                               -----------         ------                     CBC Auto Differential[[id number]]        Abnormal            Final result                 Please view results for these tests on the individual orders.    Comprehensive Metabolic Panel [[id number]]  (Abnormal) Collected: 05/02/22 1616    Specimen: Blood Updated: 05/02/22 1722     Glucose 129 mg/dL      BUN 56 mg/dL      Creatinine 2.77 mg/dL      Sodium 141 mmol/L      Potassium 4.4 mmol/L      Chloride 104 mmol/L      CO2 23.1 mmol/L      Calcium 8.6 mg/dL      Total Protein 6.4 g/dL      Albumin 4.00 g/dL      ALT (SGPT) 33 U/L      AST (SGOT) 18 U/L      Alkaline Phosphatase 93 U/L      Total Bilirubin 0.2 mg/dL      Globulin 2.4 gm/dL      A/G Ratio 1.7 g/dL      BUN/Creatinine Ratio 20.2     Anion Gap 13.9 mmol/L      eGFR 22.4 mL/min/1.73      Comment: National Kidney Foundation and American Society of Nephrology (ASN) Task Force recommended calculation based on the Chronic Kidney Disease Epidemiology Collaboration (CKD-EPI) equation refit  without adjustment for race.       Narrative:      GFR Normal >60  Chronic Kidney Disease <60  Kidney Failure <15      CBC Auto Differential [[id number]]  (Abnormal) Collected: 05/02/22 1616    Specimen: Blood Updated: 05/02/22 1703     WBC 19.57 10*3/mm3      RBC 4.04 10*6/mm3      Hemoglobin 12.4 g/dL      Hematocrit 38.2 %      MCV 94.6 fL      MCH 30.7 pg      MCHC 32.5 g/dL      RDW 12.9 %      RDW-SD 44.9 fl      MPV 10.3 fL      Platelets 343 10*3/mm3      Neutrophil % 86.4 %      Lymphocyte % 4.9 %      Monocyte % 6.9 %      Eosinophil % 0.0 %      Basophil % 0.3 %      Immature Grans % 1.5 %      Neutrophils, Absolute 16.90 10*3/mm3      Lymphocytes, Absolute 0.96 10*3/mm3      Monocytes, Absolute 1.36 10*3/mm3      Eosinophils, Absolute 0.00 10*3/mm3      Basophils, Absolute 0.05 10*3/mm3      Immature Grans, Absolute 0.30 10*3/mm3      nRBC 0.0 /100 WBC     Magnesium [[id number]]  (Abnormal) Collected: 05/02/22 1616    Specimen: Blood Updated: 05/02/22 1900     Magnesium 1.3 mg/dL     Urinalysis With Microscopic If Indicated (No Culture) - Urine, Clean Catch [[id number]]  (Abnormal) Collected: 05/02/22 1853    Specimen: Urine, Clean Catch Updated: 05/02/22 1906     Color, UA Yellow     Appearance, UA Clear     pH, UA 5.5     Specific Gravity, UA 1.015     Glucose, UA Negative     Ketones, UA Negative     Bilirubin, UA Negative     Blood, UA Negative     Protein, UA 30 mg/dL (1+)     Leuk Esterase, UA Negative     Nitrite, UA Negative     Urobilinogen, UA 0.2 E.U./dL    Urinalysis, Microscopic Only - Urine, Clean Catch [[id number]]  (Abnormal) Collected: 05/02/22 1853    Specimen: Urine, Clean Catch Updated: 05/02/22 1906     RBC, UA 0-2 /HPF      WBC, UA 0-2 /HPF      Bacteria, UA None Seen /HPF      Squamous Epithelial Cells, UA 0-2 /HPF      Hyaline Casts, UA 0-2 /LPF      Methodology Automated Microscopy           Imaging:  No Radiology Exams Resulted Within Past 24  Hours    Procedures:  Procedures    Progress  ED Course as of 05/02/22 2335   Mon May 02, 2022   1910 eGFR(!): 22.4 [VS]      ED Course User Index  [VS] Willy Shah MD                            Medical Decision Making:  MDM         This patient is a pleasant 81-year-old male with history of stage V chronic kidney disease, now presenting after his doctor check some screening lab work as an outpatient and told him to come to the ER for worsening kidney failure.    Essentially his baseline creatinine is 2.7 and it was found to be 3.2 today on the outpatient blood work, but when we repeated it it is still 2.7 in the ER.    Potassium is within normal limits and patient clinically is well-appearing and without complaint.    He is making small amounts of urine here and there is no evidence of UTI.    We did do a bladder scan roughly 100 cc an hour or so after urination, so I do not see any acute urinary retention causing worsening renal function.    I think can be safely discharged home to follow-up as an outpatient with his nephrologist for slightly gradually worsening chronic kidney disease.        Secondly, he has mild hypomagnesemia, and I did offer IV magnesium sulfate but he prefers to be discharged home and take oral magnesium oxide twice daily this week and follow-up with his doctor.      Final diagnoses:   Stage 5 chronic kidney disease not on chronic dialysis (HCC)   Hypomagnesemia        Disposition:  ED Disposition     ED Disposition   Discharge    Condition   Stable    Comment   --             Documentation assistance provided by KETAN WEEMS acting as scribe for No att. providers found. Information recorded by the scribe was done at my direction and has been verified and validated by me.        Ketan Weems  05/02/22 1901       Willy Shah MD  05/02/22 2522

## 2022-05-02 NOTE — DISCHARGE INSTRUCTIONS
Your kidney function today looked the same as it did 9 months ago and no emergency was found.    Your magnesium level is somewhat low measuring 1.3 so you should take the magnesium pills twice a day and follow-up with your doctor.    Please call your kidney doctor for a follow-up to see if you need any further evaluation and management of your kidney disease.

## 2022-12-06 ENCOUNTER — APPOINTMENT (OUTPATIENT)
Dept: GENERAL RADIOLOGY | Facility: HOSPITAL | Age: 82
End: 2022-12-06

## 2022-12-06 ENCOUNTER — HOSPITAL ENCOUNTER (EMERGENCY)
Facility: HOSPITAL | Age: 82
Discharge: HOME | End: 2022-12-07
Attending: EMERGENCY MEDICINE | Admitting: EMERGENCY MEDICINE

## 2022-12-06 DIAGNOSIS — J11.1 INFLUENZA-LIKE ILLNESS: ICD-10-CM

## 2022-12-06 DIAGNOSIS — N18.9 ACUTE ON CHRONIC RENAL INSUFFICIENCY: ICD-10-CM

## 2022-12-06 DIAGNOSIS — E83.42 HYPOMAGNESEMIA: ICD-10-CM

## 2022-12-06 DIAGNOSIS — N28.9 ACUTE ON CHRONIC RENAL INSUFFICIENCY: ICD-10-CM

## 2022-12-06 DIAGNOSIS — R00.2 HEART PALPITATIONS: Primary | ICD-10-CM

## 2022-12-06 LAB
ALBUMIN SERPL-MCNC: 3.9 G/DL (ref 3.5–5.2)
ALBUMIN/GLOB SERPL: 1.3 G/DL
ALP SERPL-CCNC: 86 U/L (ref 39–117)
ALT SERPL W P-5'-P-CCNC: 20 U/L (ref 1–41)
ANION GAP SERPL CALCULATED.3IONS-SCNC: 16.8 MMOL/L (ref 5–15)
AST SERPL-CCNC: 16 U/L (ref 1–40)
BASOPHILS # BLD AUTO: 0.04 10*3/MM3 (ref 0–0.2)
BASOPHILS NFR BLD AUTO: 0.3 % (ref 0–1.5)
BILIRUB SERPL-MCNC: 0.2 MG/DL (ref 0–1.2)
BUN SERPL-MCNC: 61 MG/DL (ref 8–23)
BUN/CREAT SERPL: 18.5 (ref 7–25)
CALCIUM SPEC-SCNC: 8.9 MG/DL (ref 8.6–10.5)
CHLORIDE SERPL-SCNC: 102 MMOL/L (ref 98–107)
CO2 SERPL-SCNC: 21.2 MMOL/L (ref 22–29)
CREAT SERPL-MCNC: 3.3 MG/DL (ref 0.76–1.27)
DEPRECATED RDW RBC AUTO: 43.8 FL (ref 37–54)
EGFRCR SERPLBLD CKD-EPI 2021: 18 ML/MIN/1.73
EOSINOPHIL # BLD AUTO: 0.12 10*3/MM3 (ref 0–0.4)
EOSINOPHIL NFR BLD AUTO: 0.9 % (ref 0.3–6.2)
ERYTHROCYTE [DISTWIDTH] IN BLOOD BY AUTOMATED COUNT: 12.9 % (ref 12.3–15.4)
GLOBULIN UR ELPH-MCNC: 3.1 GM/DL
GLUCOSE SERPL-MCNC: 110 MG/DL (ref 65–99)
HCT VFR BLD AUTO: 37.8 % (ref 37.5–51)
HGB BLD-MCNC: 12.8 G/DL (ref 13–17.7)
HOLD SPECIMEN: NORMAL
HOLD SPECIMEN: NORMAL
IMM GRANULOCYTES # BLD AUTO: 0.12 10*3/MM3 (ref 0–0.05)
IMM GRANULOCYTES NFR BLD AUTO: 0.9 % (ref 0–0.5)
LYMPHOCYTES # BLD AUTO: 1.51 10*3/MM3 (ref 0.7–3.1)
LYMPHOCYTES NFR BLD AUTO: 10.8 % (ref 19.6–45.3)
MAGNESIUM SERPL-MCNC: 1.2 MG/DL (ref 1.6–2.4)
MCH RBC QN AUTO: 31.3 PG (ref 26.6–33)
MCHC RBC AUTO-ENTMCNC: 33.9 G/DL (ref 31.5–35.7)
MCV RBC AUTO: 92.4 FL (ref 79–97)
MONOCYTES # BLD AUTO: 1.16 10*3/MM3 (ref 0.1–0.9)
MONOCYTES NFR BLD AUTO: 8.3 % (ref 5–12)
NEUTROPHILS NFR BLD AUTO: 11 10*3/MM3 (ref 1.7–7)
NEUTROPHILS NFR BLD AUTO: 78.8 % (ref 42.7–76)
NRBC BLD AUTO-RTO: 0 /100 WBC (ref 0–0.2)
PLATELET # BLD AUTO: 328 10*3/MM3 (ref 140–450)
PMV BLD AUTO: 10.2 FL (ref 6–12)
POTASSIUM SERPL-SCNC: 4.5 MMOL/L (ref 3.5–5.2)
PROT SERPL-MCNC: 7 G/DL (ref 6–8.5)
QT INTERVAL: 433 MS
RBC # BLD AUTO: 4.09 10*6/MM3 (ref 4.14–5.8)
SODIUM SERPL-SCNC: 140 MMOL/L (ref 136–145)
TROPONIN T SERPL-MCNC: 0.02 NG/ML (ref 0–0.03)
WBC NRBC COR # BLD: 13.95 10*3/MM3 (ref 3.4–10.8)
WHOLE BLOOD HOLD COAG: NORMAL
WHOLE BLOOD HOLD SPECIMEN: NORMAL

## 2022-12-06 PROCEDURE — 36415 COLL VENOUS BLD VENIPUNCTURE: CPT

## 2022-12-06 PROCEDURE — 93005 ELECTROCARDIOGRAM TRACING: CPT | Performed by: EMERGENCY MEDICINE

## 2022-12-06 PROCEDURE — 71045 X-RAY EXAM CHEST 1 VIEW: CPT

## 2022-12-06 PROCEDURE — 99283 EMERGENCY DEPT VISIT LOW MDM: CPT

## 2022-12-06 PROCEDURE — 96366 THER/PROPH/DIAG IV INF ADDON: CPT

## 2022-12-06 PROCEDURE — 96365 THER/PROPH/DIAG IV INF INIT: CPT

## 2022-12-06 PROCEDURE — 85025 COMPLETE CBC W/AUTO DIFF WBC: CPT

## 2022-12-06 PROCEDURE — 25010000002 MAGNESIUM SULFATE 2 GM/50ML SOLUTION: Performed by: EMERGENCY MEDICINE

## 2022-12-06 PROCEDURE — 96360 HYDRATION IV INFUSION INIT: CPT

## 2022-12-06 PROCEDURE — 83735 ASSAY OF MAGNESIUM: CPT

## 2022-12-06 PROCEDURE — 80053 COMPREHEN METABOLIC PANEL: CPT

## 2022-12-06 PROCEDURE — 93005 ELECTROCARDIOGRAM TRACING: CPT

## 2022-12-06 PROCEDURE — 93010 ELECTROCARDIOGRAM REPORT: CPT | Performed by: SPECIALIST

## 2022-12-06 PROCEDURE — 84484 ASSAY OF TROPONIN QUANT: CPT

## 2022-12-06 RX ORDER — MAGNESIUM SULFATE HEPTAHYDRATE 40 MG/ML
2 INJECTION, SOLUTION INTRAVENOUS ONCE
Status: COMPLETED | OUTPATIENT
Start: 2022-12-06 | End: 2022-12-07

## 2022-12-06 RX ORDER — SODIUM CHLORIDE 0.9 % (FLUSH) 0.9 %
10 SYRINGE (ML) INJECTION AS NEEDED
Status: DISCONTINUED | OUTPATIENT
Start: 2022-12-06 | End: 2022-12-07 | Stop reason: HOSPADM

## 2022-12-06 RX ADMIN — SODIUM CHLORIDE 1000 ML: 9 INJECTION, SOLUTION INTRAVENOUS at 23:35

## 2022-12-06 RX ADMIN — MAGNESIUM SULFATE HEPTAHYDRATE 2 G: 40 INJECTION, SOLUTION INTRAVENOUS at 23:35

## 2022-12-07 VITALS
HEIGHT: 65 IN | WEIGHT: 134.7 LBS | DIASTOLIC BLOOD PRESSURE: 57 MMHG | BODY MASS INDEX: 22.44 KG/M2 | TEMPERATURE: 98.2 F | OXYGEN SATURATION: 95 % | RESPIRATION RATE: 20 BRPM | SYSTOLIC BLOOD PRESSURE: 132 MMHG | HEART RATE: 76 BPM

## 2022-12-07 LAB
BACTERIA UR QL AUTO: ABNORMAL /HPF
BILIRUB UR QL STRIP: NEGATIVE
CLARITY UR: CLEAR
COLOR UR: YELLOW
GLUCOSE UR STRIP-MCNC: NEGATIVE MG/DL
HGB UR QL STRIP.AUTO: NEGATIVE
HYALINE CASTS UR QL AUTO: ABNORMAL /LPF
KETONES UR QL STRIP: NEGATIVE
LEUKOCYTE ESTERASE UR QL STRIP.AUTO: NEGATIVE
NITRITE UR QL STRIP: NEGATIVE
PH UR STRIP.AUTO: 5.5 [PH] (ref 5–8)
PROT UR QL STRIP: ABNORMAL
RBC # UR STRIP: ABNORMAL /HPF
REF LAB TEST METHOD: ABNORMAL
SP GR UR STRIP: 1.01 (ref 1–1.03)
SQUAMOUS #/AREA URNS HPF: ABNORMAL /HPF
UROBILINOGEN UR QL STRIP: ABNORMAL
WBC # UR STRIP: ABNORMAL /HPF

## 2022-12-07 PROCEDURE — 81001 URINALYSIS AUTO W/SCOPE: CPT | Performed by: EMERGENCY MEDICINE

## 2022-12-07 NOTE — ED PROVIDER NOTES
"Time: 9:07 PM EST  Chief Complaint:   Chief Complaint   Patient presents with   • Irregular Heart Beat     Family states pt has been running fever a week ago and now pt states he went to  and they said his HR was irregular           History of Present Illness (obtained in triage):  Patient is a 82 y.o. year old male who presents to the emergency department with complaints of \"feeling bad all over\".  Patient is here with his daughter who reports that they were sent from urgent care after noticing an irregular heart rhythm.  Patient states that he felt like he had the flu a couple of weeks ago and he states he received the flu shot about a week ago and since then he has not felt well.  He does report some left-sided chest pain that is nonradiating as well as some shortness of breath that has been ongoing for the last couple of days.  Denies fever.  Denies cough.  Denies any history of irregular heart rhythm.  Denies dizziness.        History of Present Illness (obtained by ED Provider, Dr. Willy Shah)   The patient was sent to the ED today from urgent care due to irregular heart beat. Family reports he has had a fever over the past week. His flu-like symptoms have mostly resolved now, but he continues to have a mild cough and decreased appetite. His Covid and flu testing were negative. His HR on arrival was 129 BPM. He denies past history of Afib. Denies seeing a cardiologist. Past medical history of previous CVA (per family, \"He has had a lot of strokes.\")              Patient Care Team  Primary Care Provider: Allison Villafana MD    Past Medical History:     Allergies   Allergen Reactions   • Aspirin Irritability      thins blood     Past Medical History:   Diagnosis Date   • Hyperlipidemia    • Hypertension    • Renal disorder      Past Surgical History:   Procedure Laterality Date   • TONSILLECTOMY       History reviewed. No pertinent family history.    Home Medications:  Prior to Admission medications  " "  Medication Sig Start Date End Date Taking? Authorizing Provider   HYDROcodone-acetaminophen (NORCO) 5-325 MG per tablet Take 1 tablet by mouth Every 6 (Six) Hours As Needed for Moderate Pain . 4/14/22   Tesfaye Bowens DO   lisinopril-hydrochlorothiazide (PRINZIDE,ZESTORETIC) 20-12.5 MG per tablet Take 1 tablet by mouth Daily.    Cholo Jaime MD   magnesium oxide (MAG-OX) 400 MG tablet Take 1 tablet by mouth 2 (Two) Times a Day. 5/2/22   Willy Shah MD   methylPREDNISolone (MEDROL) 4 MG tablet Take 4 mg by mouth Daily.    ProviderCholo MD   omeprazole (priLOSEC) 20 MG capsule TAKE 1 CAPSULE EVERY DAY DO NOT CRUSH OR CHEW 7/2/21   ProviderCholo MD        Social History:   Social History     Tobacco Use   • Smoking status: Never   • Smokeless tobacco: Never   Vaping Use   • Vaping Use: Never used   Substance Use Topics   • Alcohol use: Not Currently   • Drug use: Never         Review of Systems:  Review of Systems   Constitutional: Positive for appetite change (decreased), fatigue and fever (previous, resolved). Negative for chills.   HENT: Negative for congestion, ear pain and sore throat.    Eyes: Negative for pain.   Respiratory: Positive for cough and shortness of breath. Negative for chest tightness.    Cardiovascular: Positive for chest pain.   Gastrointestinal: Positive for diarrhea (baseline). Negative for abdominal pain, nausea and vomiting.   Genitourinary: Negative for flank pain and hematuria.   Musculoskeletal: Negative for joint swelling.   Skin: Negative for pallor.   Neurological: Negative for seizures and headaches.   All other systems reviewed and are negative.       Physical Exam:  /57   Pulse 76   Temp 98.2 °F (36.8 °C) (Oral)   Resp 20   Ht 165.1 cm (65\")   Wt 61.1 kg (134 lb 11.2 oz)   SpO2 95%   BMI 22.42 kg/m²     Physical Exam  Vitals and nursing note reviewed.   Constitutional:       General: He is not in acute distress.     Appearance: Normal " appearance. He is not ill-appearing or toxic-appearing.   HENT:      Head: Normocephalic and atraumatic.      Mouth/Throat:      Mouth: Mucous membranes are moist.   Eyes:      General: No scleral icterus.     Extraocular Movements: Extraocular movements intact.      Conjunctiva/sclera: Conjunctivae normal.   Cardiovascular:      Rate and Rhythm: Normal rate. Rhythm irregular.      Pulses: Normal pulses.           Radial pulses are 2+ on the right side and 2+ on the left side.      Heart sounds: Normal heart sounds. No murmur heard.  Pulmonary:      Effort: Pulmonary effort is normal. No respiratory distress.      Breath sounds: Normal breath sounds. No wheezing.   Abdominal:      General: Abdomen is flat.      Palpations: Abdomen is soft.      Tenderness: There is no abdominal tenderness.   Musculoskeletal:         General: Normal range of motion.      Cervical back: Normal range of motion and neck supple.      Right lower leg: No tenderness. No edema.      Left lower leg: No tenderness. No edema.   Skin:     General: Skin is warm and dry.      Coloration: Skin is not cyanotic.   Neurological:      Mental Status: He is alert and oriented to person, place, and time. Mental status is at baseline.   Psychiatric:         Attention and Perception: Attention and perception normal.         Mood and Affect: Mood normal.                Medications in the Emergency Department:  Medications   sodium chloride 0.9 % flush 10 mL (has no administration in time range)   magnesium sulfate 2g/50 mL (PREMIX) infusion (0 g Intravenous Stopped 12/7/22 0127)   sodium chloride 0.9 % bolus 1,000 mL (0 mL Intravenous Stopped 12/7/22 0127)        Labs  Lab Results (last 24 hours)     Procedure Component Value Units Date/Time    CBC & Differential [[id number]]  (Abnormal) Collected: 12/06/22 2119    Specimen: Blood Updated: 12/06/22 2144    Narrative:      The following orders were created for panel order CBC & Differential.  Procedure                                Abnormality         Status                     ---------                               -----------         ------                     CBC Auto Differential[[id number]]        Abnormal            Final result                 Please view results for these tests on the individual orders.    Comprehensive Metabolic Panel [[id number]]  (Abnormal) Collected: 12/06/22 2119    Specimen: Blood Updated: 12/06/22 2208     Glucose 110 mg/dL      BUN 61 mg/dL      Creatinine 3.30 mg/dL      Sodium 140 mmol/L      Potassium 4.5 mmol/L      Chloride 102 mmol/L      CO2 21.2 mmol/L      Calcium 8.9 mg/dL      Total Protein 7.0 g/dL      Albumin 3.90 g/dL      ALT (SGPT) 20 U/L      AST (SGOT) 16 U/L      Alkaline Phosphatase 86 U/L      Total Bilirubin 0.2 mg/dL      Globulin 3.1 gm/dL      A/G Ratio 1.3 g/dL      BUN/Creatinine Ratio 18.5     Anion Gap 16.8 mmol/L      eGFR 18.0 mL/min/1.73      Comment: National Kidney Foundation and American Society of Nephrology (ASN) Task Force recommended calculation based on the Chronic Kidney Disease Epidemiology Collaboration (CKD-EPI) equation refit without adjustment for race.       Narrative:      GFR Normal >60  Chronic Kidney Disease <60  Kidney Failure <15    The GFR formula is only valid for adults with stable renal function between ages 18 and 70.    Magnesium [[id number]]  (Abnormal) Collected: 12/06/22 2119    Specimen: Blood Updated: 12/06/22 2208     Magnesium 1.2 mg/dL     Troponin [[id number]]  (Normal) Collected: 12/06/22 2119    Specimen: Blood Updated: 12/06/22 2208     Troponin T 0.016 ng/mL     Narrative:      Troponin T Reference Range:  <= 0.03 ng/mL-   Negative for AMI  >0.03 ng/mL-     Abnormal for myocardial necrosis.  Clinicians would have to utilize clinical acumen, EKG, Troponin and serial changes to determine if it is an Acute Myocardial Infarction or myocardial injury due to an underlying chronic condition.       Results may be falsely  decreased if patient taking Biotin.      CBC Auto Differential [[id number]]  (Abnormal) Collected: 12/06/22 2119    Specimen: Blood Updated: 12/06/22 2144     WBC 13.95 10*3/mm3      RBC 4.09 10*6/mm3      Hemoglobin 12.8 g/dL      Hematocrit 37.8 %      MCV 92.4 fL      MCH 31.3 pg      MCHC 33.9 g/dL      RDW 12.9 %      RDW-SD 43.8 fl      MPV 10.2 fL      Platelets 328 10*3/mm3      Neutrophil % 78.8 %      Lymphocyte % 10.8 %      Monocyte % 8.3 %      Eosinophil % 0.9 %      Basophil % 0.3 %      Immature Grans % 0.9 %      Neutrophils, Absolute 11.00 10*3/mm3      Lymphocytes, Absolute 1.51 10*3/mm3      Monocytes, Absolute 1.16 10*3/mm3      Eosinophils, Absolute 0.12 10*3/mm3      Basophils, Absolute 0.04 10*3/mm3      Immature Grans, Absolute 0.12 10*3/mm3      nRBC 0.0 /100 WBC     Urinalysis With Culture If Indicated - Urine, Clean Catch [[id number]]  (Abnormal) Collected: 12/07/22 0044    Specimen: Urine, Clean Catch Updated: 12/07/22 0107     Color, UA Yellow     Appearance, UA Clear     pH, UA 5.5     Specific Gravity, UA 1.015     Glucose, UA Negative     Ketones, UA Negative     Bilirubin, UA Negative     Blood, UA Negative     Protein,  mg/dL (2+)     Leuk Esterase, UA Negative     Nitrite, UA Negative     Urobilinogen, UA 0.2 E.U./dL    Narrative:      In absence of clinical symptoms, the presence of pyuria, bacteria, and/or nitrites on the urinalysis result does not correlate with infection.    Urinalysis, Microscopic Only - Urine, Clean Catch [[id number]]  (Abnormal) Collected: 12/07/22 0044    Specimen: Urine, Clean Catch Updated: 12/07/22 0107     RBC, UA 0-2 /HPF      WBC, UA 0-2 /HPF      Comment: Urine culture not indicated.        Bacteria, UA None Seen /HPF      Squamous Epithelial Cells, UA 0-2 /HPF      Hyaline Casts, UA None Seen /LPF      Methodology Automated Microscopy           Imaging:  XR Chest 1 View    Result Date: 12/6/2022  PROCEDURE: XR CHEST 1 VW  COMPARISON: None.   INDICATIONS: Dysrhythmia.  FINDINGS: A single frontal (AP or PA upright portable) chest radiograph reveals probably no cardiac enlargement and no acute infiltrate. No pneumothorax is seen.  External artifacts obscure detail.  The thoracic aorta is atherosclerotic.  Chronic calcified granulomatous disease involves the chest.  Degenerative changes involve the bilateral shoulders.       No acute cardiopulmonary disease is seen radiographically.     Please note that portions of this note were completed with a voice recognition program.  PADMINI IRBY JR, MD       Electronically Signed and Approved By: PADMINI IRBY JR, MD on 12/06/2022 at 23:46                Procedures:  Procedures    Progress  ED Course as of 12/07/22 0317   Tue Dec 06, 2022   2107 --- PROVIDER IN TRIAGE NOTE ---    The patient was evaluated in triage by Magaly bhatt. Orders were placed and the patient is currently awaiting disposition. [AR]   2322 EKG: I have reviewed and interpreted her twelve-lead EKG is sinus rhythm at 64 bpm with frequent PACs, normal P waves, inferior Q waves noted, normal ST segments and T waves; no acute ischemia. [VS]      ED Course User Index  [AR] Magaly Flaherty, APRLORA  [VS] Willy Shah MD                                Medical Decision Making:  MDM       My differential diagnosis for this patient's palpitations includes PVCs or PACs, cardiac arrhythmias including SVT or V. tach or A. fib, sinus tachycardia, acute anxiety.        This patient is a pleasant 82-year-old male who presents with some heart palpitations sent from urgent care, in the setting of recently having flulike illness the past couple of weeks.    I note that his magnesium level was fairly low at 1.2 today, and we repleted this with IV magnesium sulfate 2 g in the ED.    I also gave him some IV fluids as he looked dehydrated.    I observed him on the cardiac monitor and looked at his EKG which did show some PACs occasionally but no actual  arrhythmia seen.    He looks stable for discharge home to follow-up closely with cardiology for these palpitations, which currently appear to be due to PACs in the setting of dehydration and hypomagnesemia, both of which we corrected in the ED.      Final diagnoses:   Heart palpitations   Hypomagnesemia   Acute on chronic renal insufficiency   Influenza-like illness          Disposition:  ED Disposition     ED Disposition   Discharge    Condition   Stable    Comment   --             This medical record created using voice recognition software.           Alyssa Saunders  12/06/22 6157       Alyssa Saunders  12/06/22 2661       Willy Shah MD  12/07/22 4146

## 2022-12-07 NOTE — DISCHARGE INSTRUCTIONS
Your blood work today look like you are somewhat dehydrated and so you were given some IV fluids in the ED, and also we gave you some IV magnesium because your levels were too low.    The dehydration and low magnesium levels can lead to some abnormal heart rhythms such as atrial fibrillation (A. fib) which can cause you to feel palpitations.    Make sure you keep drinking fluids to stay hydrated and keep taking her magnesium oxide pills and follow-up with your doctor this week.    Ask your doctor if there are any other medications besides aspirin your that you can take to keep your blood somewhat thin and prevent you from having a stroke, in the setting of going in and out of A. fib heart rhythm.

## 2023-01-18 ENCOUNTER — APPOINTMENT (OUTPATIENT)
Dept: GENERAL RADIOLOGY | Facility: HOSPITAL | Age: 83
End: 2023-01-18
Payer: MEDICARE

## 2023-01-18 ENCOUNTER — HOSPITAL ENCOUNTER (EMERGENCY)
Facility: HOSPITAL | Age: 83
Discharge: HOME | End: 2023-01-18
Attending: EMERGENCY MEDICINE | Admitting: EMERGENCY MEDICINE
Payer: MEDICARE

## 2023-01-18 VITALS
OXYGEN SATURATION: 96 % | TEMPERATURE: 97.8 F | RESPIRATION RATE: 18 BRPM | SYSTOLIC BLOOD PRESSURE: 152 MMHG | WEIGHT: 132.94 LBS | HEIGHT: 64 IN | BODY MASS INDEX: 22.7 KG/M2 | DIASTOLIC BLOOD PRESSURE: 50 MMHG | HEART RATE: 75 BPM

## 2023-01-18 DIAGNOSIS — B34.2 CORONAVIRUS INFECTION: ICD-10-CM

## 2023-01-18 DIAGNOSIS — J98.01 BRONCHOSPASM: ICD-10-CM

## 2023-01-18 DIAGNOSIS — J11.1 INFLUENZA: Primary | ICD-10-CM

## 2023-01-18 LAB
ALBUMIN SERPL-MCNC: 3.9 G/DL (ref 3.5–5.2)
ALBUMIN/GLOB SERPL: 1.4 G/DL
ALP SERPL-CCNC: 92 U/L (ref 39–117)
ALT SERPL W P-5'-P-CCNC: 20 U/L (ref 1–41)
ANION GAP SERPL CALCULATED.3IONS-SCNC: 10.1 MMOL/L (ref 5–15)
AST SERPL-CCNC: 18 U/L (ref 1–40)
BASOPHILS # BLD AUTO: 0.11 10*3/MM3 (ref 0–0.2)
BASOPHILS NFR BLD AUTO: 0.8 % (ref 0–1.5)
BILIRUB SERPL-MCNC: 0.2 MG/DL (ref 0–1.2)
BUN SERPL-MCNC: 25 MG/DL (ref 8–23)
BUN/CREAT SERPL: 10.4 (ref 7–25)
CALCIUM SPEC-SCNC: 9.3 MG/DL (ref 8.6–10.5)
CHLORIDE SERPL-SCNC: 106 MMOL/L (ref 98–107)
CO2 SERPL-SCNC: 21.9 MMOL/L (ref 22–29)
CREAT SERPL-MCNC: 2.4 MG/DL (ref 0.76–1.27)
DEPRECATED RDW RBC AUTO: 49.2 FL (ref 37–54)
EGFRCR SERPLBLD CKD-EPI 2021: 26.4 ML/MIN/1.73
EOSINOPHIL # BLD AUTO: 0.21 10*3/MM3 (ref 0–0.4)
EOSINOPHIL NFR BLD AUTO: 1.6 % (ref 0.3–6.2)
ERYTHROCYTE [DISTWIDTH] IN BLOOD BY AUTOMATED COUNT: 14.1 % (ref 12.3–15.4)
GLOBULIN UR ELPH-MCNC: 2.8 GM/DL
GLUCOSE SERPL-MCNC: 139 MG/DL (ref 65–99)
HCT VFR BLD AUTO: 36.9 % (ref 37.5–51)
HGB BLD-MCNC: 12.1 G/DL (ref 13–17.7)
HOLD SPECIMEN: NORMAL
HOLD SPECIMEN: NORMAL
IMM GRANULOCYTES # BLD AUTO: 0.07 10*3/MM3 (ref 0–0.05)
IMM GRANULOCYTES NFR BLD AUTO: 0.5 % (ref 0–0.5)
LYMPHOCYTES # BLD AUTO: 1.44 10*3/MM3 (ref 0.7–3.1)
LYMPHOCYTES NFR BLD AUTO: 10.9 % (ref 19.6–45.3)
MCH RBC QN AUTO: 31.3 PG (ref 26.6–33)
MCHC RBC AUTO-ENTMCNC: 32.8 G/DL (ref 31.5–35.7)
MCV RBC AUTO: 95.3 FL (ref 79–97)
MONOCYTES # BLD AUTO: 0.64 10*3/MM3 (ref 0.1–0.9)
MONOCYTES NFR BLD AUTO: 4.8 % (ref 5–12)
NEUTROPHILS NFR BLD AUTO: 10.78 10*3/MM3 (ref 1.7–7)
NEUTROPHILS NFR BLD AUTO: 81.4 % (ref 42.7–76)
NRBC BLD AUTO-RTO: 0 /100 WBC (ref 0–0.2)
NT-PROBNP SERPL-MCNC: 1239 PG/ML (ref 0–1800)
PLATELET # BLD AUTO: 304 10*3/MM3 (ref 140–450)
PMV BLD AUTO: 9.4 FL (ref 6–12)
POTASSIUM SERPL-SCNC: 5.2 MMOL/L (ref 3.5–5.2)
PROT SERPL-MCNC: 6.7 G/DL (ref 6–8.5)
QT INTERVAL: 429 MS
RBC # BLD AUTO: 3.87 10*6/MM3 (ref 4.14–5.8)
SODIUM SERPL-SCNC: 138 MMOL/L (ref 136–145)
TROPONIN T SERPL-MCNC: <0.01 NG/ML (ref 0–0.03)
WBC NRBC COR # BLD: 13.25 10*3/MM3 (ref 3.4–10.8)
WHOLE BLOOD HOLD COAG: NORMAL
WHOLE BLOOD HOLD SPECIMEN: NORMAL

## 2023-01-18 PROCEDURE — 80053 COMPREHEN METABOLIC PANEL: CPT | Performed by: EMERGENCY MEDICINE

## 2023-01-18 PROCEDURE — 99284 EMERGENCY DEPT VISIT MOD MDM: CPT

## 2023-01-18 PROCEDURE — 36415 COLL VENOUS BLD VENIPUNCTURE: CPT

## 2023-01-18 PROCEDURE — 83880 ASSAY OF NATRIURETIC PEPTIDE: CPT

## 2023-01-18 PROCEDURE — 93010 ELECTROCARDIOGRAM REPORT: CPT | Performed by: INTERNAL MEDICINE

## 2023-01-18 PROCEDURE — 71045 X-RAY EXAM CHEST 1 VIEW: CPT

## 2023-01-18 PROCEDURE — 94799 UNLISTED PULMONARY SVC/PX: CPT

## 2023-01-18 PROCEDURE — 93005 ELECTROCARDIOGRAM TRACING: CPT | Performed by: EMERGENCY MEDICINE

## 2023-01-18 PROCEDURE — 84484 ASSAY OF TROPONIN QUANT: CPT

## 2023-01-18 PROCEDURE — 94640 AIRWAY INHALATION TREATMENT: CPT

## 2023-01-18 PROCEDURE — 93005 ELECTROCARDIOGRAM TRACING: CPT

## 2023-01-18 PROCEDURE — 63710000001 PREDNISONE PER 5 MG: Performed by: EMERGENCY MEDICINE

## 2023-01-18 PROCEDURE — 85025 COMPLETE CBC W/AUTO DIFF WBC: CPT

## 2023-01-18 RX ORDER — ALBUTEROL SULFATE 90 UG/1
2 AEROSOL, METERED RESPIRATORY (INHALATION) EVERY 4 HOURS PRN
Qty: 1 G | Refills: 0 | Status: SHIPPED | OUTPATIENT
Start: 2023-01-18 | End: 2023-02-23

## 2023-01-18 RX ORDER — IPRATROPIUM BROMIDE AND ALBUTEROL SULFATE 2.5; .5 MG/3ML; MG/3ML
SOLUTION RESPIRATORY (INHALATION)
Status: DISCONTINUED
Start: 2023-01-18 | End: 2023-01-18 | Stop reason: WASHOUT

## 2023-01-18 RX ORDER — AMLODIPINE BESYLATE 10 MG/1
10 TABLET ORAL DAILY
COMMUNITY

## 2023-01-18 RX ORDER — IPRATROPIUM BROMIDE AND ALBUTEROL SULFATE 2.5; .5 MG/3ML; MG/3ML
3 SOLUTION RESPIRATORY (INHALATION)
Status: COMPLETED | OUTPATIENT
Start: 2023-01-18 | End: 2023-01-18

## 2023-01-18 RX ORDER — PREDNISONE 20 MG/1
TABLET ORAL
Qty: 15 TABLET | Refills: 0 | Status: SHIPPED | OUTPATIENT
Start: 2023-01-18 | End: 2023-02-23

## 2023-01-18 RX ORDER — SODIUM CHLORIDE 0.9 % (FLUSH) 0.9 %
10 SYRINGE (ML) INJECTION AS NEEDED
Status: DISCONTINUED | OUTPATIENT
Start: 2023-01-18 | End: 2023-01-18 | Stop reason: HOSPADM

## 2023-01-18 RX ORDER — PRAVASTATIN SODIUM 40 MG
40 TABLET ORAL DAILY
COMMUNITY

## 2023-01-18 RX ORDER — OSELTAMIVIR PHOSPHATE 30 MG/1
30 CAPSULE ORAL 2 TIMES DAILY
Qty: 10 CAPSULE | Refills: 0 | Status: SHIPPED | OUTPATIENT
Start: 2023-01-18 | End: 2023-02-23

## 2023-01-18 RX ADMIN — PREDNISONE 60 MG: 50 TABLET ORAL at 13:51

## 2023-01-18 RX ADMIN — IPRATROPIUM BROMIDE AND ALBUTEROL SULFATE 3 ML: 2.5; .5 SOLUTION RESPIRATORY (INHALATION) at 14:11

## 2023-01-18 RX ADMIN — IPRATROPIUM BROMIDE AND ALBUTEROL SULFATE 3 ML: 2.5; .5 SOLUTION RESPIRATORY (INHALATION) at 14:12

## 2023-01-18 NOTE — DISCHARGE INSTRUCTIONS
Drink plenty of fluids.  Use inhaler as directed.  Take medication as directed.  Return for worsening symptoms.  Follow-up with your doctor in 2 days if no better.

## 2023-01-18 NOTE — ED PROVIDER NOTES
Time: 1:57 PM EST  Date of encounter:  1/18/2023  Independent Historian/Clinical History and Information was obtained by:   Patient, Family and Chart  Chief Complaint: COVID-19 and Influenza A    History is limited by: N/A    History of Present Illness:  Patient is a 82 y.o. year old male who presents to the emergency department for evaluation of COVID-19 and Influenza A.    Patient has a medical history of renal disorder, hypertension, hyperlipidemia, PVD, TIA, CKD IV. Patient is a former smoker, but no current alcohol use, and no history of drug use.    Patient is experiencing symptoms of SOB, cough, generalized weakness a week ago. Patient notes SOB occurs at rest and on exertion. Patient notes the cough is productive.Patient rates their pain 0 out of 10 at rest, and with movement or activity. Patient states he tested positive for COVID-19  and influenza A at his Primary Care Providers office this morning (01/18/2023). Patient states he is not O2 dependent at home. Patient also confirms symptoms of nausea with loss of appetite and unintentional weight loss that started approximately 3x weeks ago.    Patient denies all other symptoms including activity change, fever, chills, fatigue, congestion, ear pain, sinus pressure, sore throat, trouble swallowing, rhinorrhea, sore throat, eye discharge, eye pain, eye redness, visual disturbance, chest tightness, chest pain, wheezing, palpitations, abdominal pain, nausea, vomiting, diarrhea, constipation, cold intolerance, polydipsia, dysuria, hematuria, urinary frequency, urinary urgency, arthralgia, joint swelling, neck, pain, neck stiffness, skin color change skin rash, environmental allergies, immunocompromised, dizziness, lightheadedness, bruises/bleeds easily, agitation, confusion, dysphoric mood, suicidal ideation.          Patient Care Team  Primary Care Provider: Allison Villafana MD    Past Medical History:     No Known Allergies  Past Medical History:   Diagnosis  Date   • Hyperlipidemia    • Hypertension    • Renal disorder      Past Surgical History:   Procedure Laterality Date   • TONSILLECTOMY       History reviewed. No pertinent family history.    Home Medications:  Prior to Admission medications    Medication Sig Start Date End Date Taking? Authorizing Provider   magnesium oxide (MAG-OX) 400 MG tablet Take 1 tablet by mouth 2 (Two) Times a Day.  Patient taking differently: Take 250 mg by mouth Daily. 5/2/22  Yes Willy Shah MD   amLODIPine (NORVASC) 10 MG tablet Take 10 mg by mouth Daily.    Cholo Jaime MD   lisinopril-hydrochlorothiazide (PRINZIDE,ZESTORETIC) 20-12.5 MG per tablet Take 1 tablet by mouth Daily.    Cholo Jaime MD   omeprazole (priLOSEC) 20 MG capsule TAKE 1 CAPSULE EVERY DAY DO NOT CRUSH OR CHEW 7/2/21   Cholo Jaime MD   pravastatin (PRAVACHOL) 40 MG tablet Take 40 mg by mouth Daily.    Cholo Jaime MD   HYDROcodone-acetaminophen (NORCO) 5-325 MG per tablet Take 1 tablet by mouth Every 6 (Six) Hours As Needed for Moderate Pain . 4/14/22 1/18/23  Tesfaye Bowens DO   methylPREDNISolone (MEDROL) 4 MG tablet Take 4 mg by mouth Daily.  1/18/23  Cholo Jaime MD        Social History:   Social History     Tobacco Use   • Smoking status: Former     Years: 3.00     Types: Cigarettes     Quit date: 7/14/2019     Years since quitting: 3.5   • Smokeless tobacco: Never   Vaping Use   • Vaping Use: Never used   Substance Use Topics   • Alcohol use: Not Currently   • Drug use: Never         Review of Systems:  Review of Systems   Constitutional: Negative for activity change, chills, fatigue and unexpected weight change.   HENT: Negative for congestion, sinus pressure, sore throat and trouble swallowing.    Eyes: Negative for pain, discharge, redness and visual disturbance.   Respiratory: Positive for cough and shortness of breath. Negative for chest tightness and wheezing.    Cardiovascular: Negative for chest pain and  "palpitations.   Gastrointestinal: Negative for abdominal pain, diarrhea, nausea and vomiting.   Endocrine: Negative for cold intolerance and polydipsia.   Genitourinary: Negative for dysuria, frequency, hematuria and urgency.   Musculoskeletal: Negative for arthralgias, joint swelling, neck pain and neck stiffness.   Skin: Negative for color change and rash.   Allergic/Immunologic: Negative for environmental allergies and immunocompromised state.   Neurological: Positive for weakness. Negative for dizziness and light-headedness.   Hematological: Does not bruise/bleed easily.   Psychiatric/Behavioral: Negative for agitation, confusion, dysphoric mood and suicidal ideas.        Physical Exam:  /50   Pulse 75   Temp 97.8 °F (36.6 °C) (Oral)   Resp 18   Ht 162.6 cm (64\")   Wt 60.3 kg (132 lb 15 oz)   SpO2 96%   BMI 22.82 kg/m²     Physical Exam  Vitals and nursing note reviewed.   Constitutional:       General: He is not in acute distress.  HENT:      Head: Normocephalic and atraumatic.      Right Ear: External ear normal.      Left Ear: External ear normal.      Nose: Nose normal.      Mouth/Throat:      Mouth: Mucous membranes are moist.   Eyes:      Extraocular Movements: Extraocular movements intact.      Conjunctiva/sclera: Conjunctivae normal.      Pupils: Pupils are equal, round, and reactive to light.   Cardiovascular:      Rate and Rhythm: Normal rate and regular rhythm.      Pulses: Normal pulses.      Heart sounds: Normal heart sounds.   Pulmonary:      Effort: Pulmonary effort is normal.      Breath sounds: Wheezing and rhonchi present. No decreased breath sounds or rales.   Abdominal:      General: Bowel sounds are normal. There is no distension.      Palpations: Abdomen is soft.   Musculoskeletal:         General: Normal range of motion.      Cervical back: Neck supple.   Neurological:      Mental Status: He is alert and oriented to person, place, and time.   Psychiatric:         Mood and " Affect: Mood normal.         Behavior: Behavior normal.                  Procedures:  Procedures      Medical Decision Making:      Comorbidities that affect care:    TIA, Chronic Kidney Disease, Hypertension    External Notes reviewed:    Previous Clinic Note      The following orders were placed and all results were independently analyzed by me:  Orders Placed This Encounter   Procedures   • XR Chest 1 View   • [place] Draw   • Comprehensive Metabolic Panel   • BNP   • Troponin   • CBC Auto Differential   • Undress & Gown   • Continuous Pulse Oximetry   • Vital Signs   • ECG 12 Lead ED Triage Standing Order; SOA   • CBC & Differential   • Green Top (Gel)   • Lavender Top   • Gold Top - SST   • Light Blue Top       Medications Given in the Emergency Department:  Medications   predniSONE (DELTASONE) tablet 60 mg (60 mg Oral Given 1/18/23 1351)   ipratropium-albuterol (DUO-NEB) nebulizer solution 3 mL (3 mL Nebulization Given 1/18/23 1412)        ED Course:         Labs:    Lab Results (last 24 hours)     Procedure Component Value Units Date/Time    CBC & Differential [[id number]]  (Abnormal) Collected: 01/18/23 1156    Specimen: Blood Updated: 01/18/23 1202    Narrative:      The following orders were created for panel order CBC & Differential.  Procedure                               Abnormality         Status                     ---------                               -----------         ------                     CBC Auto Differential[[id number]]        Abnormal            Final result                 Please view results for these tests on the individual orders.    Comprehensive Metabolic Panel [[id number]]  (Abnormal) Collected: 01/18/23 1156    Specimen: Blood Updated: 01/18/23 1221     Glucose 139 mg/dL      BUN 25 mg/dL      Creatinine 2.40 mg/dL      Sodium 138 mmol/L      Potassium 5.2 mmol/L      Chloride 106 mmol/L      CO2 21.9 mmol/L      Calcium 9.3 mg/dL      Total Protein 6.7 g/dL      Albumin 3.9 g/dL       ALT (SGPT) 20 U/L      AST (SGOT) 18 U/L      Alkaline Phosphatase 92 U/L      Total Bilirubin 0.2 mg/dL      Globulin 2.8 gm/dL      A/G Ratio 1.4 g/dL      BUN/Creatinine Ratio 10.4     Anion Gap 10.1 mmol/L      eGFR 26.4 mL/min/1.73     Narrative:      GFR Normal >60  Chronic Kidney Disease <60  Kidney Failure <15    The GFR formula is only valid for adults with stable renal function between ages 18 and 70.    BNP [[id number]]  (Normal) Collected: 01/18/23 1156    Specimen: Blood Updated: 01/18/23 1219     proBNP 1,239.0 pg/mL     Narrative:      Among patients with dyspnea, NT-proBNP is highly sensitive for the detection of acute congestive heart failure. In addition NT-proBNP of <300 pg/ml effectively rules out acute congestive heart failure with 99% negative predictive value.      Troponin [[id number]]  (Normal) Collected: 01/18/23 1156    Specimen: Blood Updated: 01/18/23 1221     Troponin T <0.010 ng/mL     Narrative:      Troponin T Reference Range:  <= 0.03 ng/mL-   Negative for AMI  >0.03 ng/mL-     Abnormal for myocardial necrosis.  Clinicians would have to utilize clinical acumen, EKG, Troponin and serial changes to determine if it is an Acute Myocardial Infarction or myocardial injury due to an underlying chronic condition.       Results may be falsely decreased if patient taking Biotin.      CBC Auto Differential [[id number]]  (Abnormal) Collected: 01/18/23 1156    Specimen: Blood Updated: 01/18/23 1202     WBC 13.25 10*3/mm3      RBC 3.87 10*6/mm3      Hemoglobin 12.1 g/dL      Hematocrit 36.9 %      MCV 95.3 fL      MCH 31.3 pg      MCHC 32.8 g/dL      RDW 14.1 %      RDW-SD 49.2 fl      MPV 9.4 fL      Platelets 304 10*3/mm3      Neutrophil % 81.4 %      Lymphocyte % 10.9 %      Monocyte % 4.8 %      Eosinophil % 1.6 %      Basophil % 0.8 %      Immature Grans % 0.5 %      Neutrophils, Absolute 10.78 10*3/mm3      Lymphocytes, Absolute 1.44 10*3/mm3      Monocytes, Absolute 0.64 10*3/mm3       Eosinophils, Absolute 0.21 10*3/mm3      Basophils, Absolute 0.11 10*3/mm3      Immature Grans, Absolute 0.07 10*3/mm3      nRBC 0.0 /100 WBC            Imaging:    XR Chest 1 View    Result Date: 1/18/2023  PROCEDURE: XR CHEST 1 VW  COMPARISON: [facility], CR, XR CHEST 1 VW, 12/06/2022, 21:51.  INDICATIONS: SOB  FINDINGS:  No focal or diffuse infiltrate is identified. No pleural effusion or pneumothorax. Heart size and mediastinal contour appear within normal limits.         No radiographic findings of acute cardiopulmonary abnormality.       ISIDRO RODRIGUEZ MD       Electronically Signed and Approved By: ISIDRO RODRIGUEZ MD on 1/18/2023 at 12:48                 Differential Diagnosis and Discussion:    Dyspnea: Differential diagnosis includes but is not limited to metabolic acidosis, neurological disorders, psychogenic, asthma, pneumothorax, upper airway obstruction, COPD, pneumonia, noncardiogenic pulmonary edema, interstitial lung disease, anemia, congestive heart failure, and pulmonary embolism    All labs were reviewed and analyzed by me.  All X-rays were independently reviewed by me.  EKG was interpreted by me.    [facility]     Critical Care Note: Total Critical Care time of 0 minutes. Total critical care time documented does not include time spent on separately billed procedures for services of nurses or physician assistants. I personally saw and examined the patient. I have reviewed all diagnostic interpretations and treatment plans as written. I was present for the key portions of any procedures performed and the inclusive time noted in any critical care statement. Critical care time includes patient management by me, time spent at the patients bedside,  time to review lab and imaging results, discussing patient care, documentation in the medical record, and time spent with family or caregiver.    Patient Care Considerations:    CT CHEST: I considered ordering a CT scan of the chest, however The patient  is not hypoxic and has an unremarkable CXR      Consultants/Shared Management Plan:    None    Social Determinants of Health:    Patient is independent, reliable, and has access to care.       Disposition and Care Coordination:    Discharged: The patient is suitable and stable for discharge with no need for consideration of observation or admission.    I have explained discharge medications and the need for follow up with the patient/caretakers. This was also printed in the discharge instructions. Patient was discharged with the following medications and follow up:      Medication List      New Prescriptions    albuterol sulfate  (90 Base) MCG/ACT inhaler  Commonly known as: PROVENTIL HFA;VENTOLIN HFA;PROAIR HFA  Inhale 2 puffs Every 4 (Four) Hours As Needed for Wheezing.     oseltamivir 30 MG capsule  Commonly known as: TAMIFLU  Take 1 capsule by mouth 2 (Two) Times a Day.     predniSONE 20 MG tablet  Commonly known as: DELTASONE  Take 3 p.o. daily for 5 days.        Changed    magnesium oxide 400 MG tablet  Commonly known as: MAG-OX  Take 1 tablet by mouth 2 (Two) Times a Day.  What changed:   · how much to take  · when to take this           Where to Get Your Medications      These medications were sent to [facility] [id number] - [place], [place] - [address] [place] [address] - [phone]  - [phone] [address] [address]    Phone: [phone]   · albuterol sulfate  (90 Base) MCG/ACT inhaler  · oseltamivir 30 MG capsule  · predniSONE 20 MG tablet      Allison Villafana MD  [address] [facility] [address] [phone]    In 2 days  If no better.       Final diagnoses:   Influenza   Coronavirus infection   Bronchospasm        ED Disposition     ED Disposition   Discharge    Condition   Stable    Comment   --             This medical record created using voice recognition software.      Documentation assistance provided by Carina Hernandez acting as scribe for Alex  DO Noel. Information recorded by the scribe was done at my direction and has been verified and validated by me.      Carina Hernandez  01/18/23 1403       Alex Cohen DO  01/18/23 6438

## 2023-02-03 ENCOUNTER — TRANSCRIBE ORDERS (OUTPATIENT)
Dept: ADMINISTRATIVE | Facility: HOSPITAL | Age: 83
End: 2023-02-03
Payer: MEDICARE

## 2023-02-03 DIAGNOSIS — R63.4 LOSS OF WEIGHT: ICD-10-CM

## 2023-02-03 DIAGNOSIS — R10.30 LOWER ABDOMINAL PAIN: ICD-10-CM

## 2023-02-03 DIAGNOSIS — N18.4 CHRONIC KIDNEY DISEASE, STAGE IV (SEVERE): Primary | ICD-10-CM

## 2023-02-03 DIAGNOSIS — R10.816 EPIGASTRIC ABDOMINAL TENDERNESS, REBOUND TENDERNESS PRESENCE NOT SPECIFIED: ICD-10-CM

## 2023-02-17 ENCOUNTER — HOSPITAL ENCOUNTER (OUTPATIENT)
Dept: CT IMAGING | Facility: HOSPITAL | Age: 83
Discharge: HOME | End: 2023-02-17
Admitting: FAMILY MEDICINE
Payer: MEDICARE

## 2023-02-17 DIAGNOSIS — R10.816 EPIGASTRIC ABDOMINAL TENDERNESS, REBOUND TENDERNESS PRESENCE NOT SPECIFIED: ICD-10-CM

## 2023-02-17 DIAGNOSIS — N18.4 CHRONIC KIDNEY DISEASE, STAGE IV (SEVERE): ICD-10-CM

## 2023-02-17 DIAGNOSIS — R63.4 LOSS OF WEIGHT: ICD-10-CM

## 2023-02-17 DIAGNOSIS — R10.30 LOWER ABDOMINAL PAIN: ICD-10-CM

## 2023-02-17 PROCEDURE — 74176 CT ABD & PELVIS W/O CONTRAST: CPT

## 2023-02-23 ENCOUNTER — HOSPITAL ENCOUNTER (INPATIENT)
Facility: HOSPITAL | Age: 83
LOS: 2 days | Discharge: LEFT BEFORE DISCHARGE | DRG: 682 | End: 2023-02-25
Attending: EMERGENCY MEDICINE | Admitting: FAMILY MEDICINE
Payer: MEDICARE

## 2023-02-23 DIAGNOSIS — N18.9 ACUTE KIDNEY INJURY SUPERIMPOSED ON CHRONIC KIDNEY DISEASE: ICD-10-CM

## 2023-02-23 DIAGNOSIS — E83.42 HYPOMAGNESEMIA: ICD-10-CM

## 2023-02-23 DIAGNOSIS — E87.6 HYPOKALEMIA: ICD-10-CM

## 2023-02-23 DIAGNOSIS — R11.2 NAUSEA AND VOMITING, UNSPECIFIED VOMITING TYPE: Primary | ICD-10-CM

## 2023-02-23 DIAGNOSIS — E83.51 HYPOCALCEMIA: ICD-10-CM

## 2023-02-23 DIAGNOSIS — N17.9 ACUTE KIDNEY INJURY SUPERIMPOSED ON CHRONIC KIDNEY DISEASE: ICD-10-CM

## 2023-02-23 LAB
ALBUMIN SERPL-MCNC: 3.5 G/DL (ref 3.5–5.2)
ALBUMIN/GLOB SERPL: 1.1 G/DL
ALP SERPL-CCNC: 96 U/L (ref 39–117)
ALT SERPL W P-5'-P-CCNC: 15 U/L (ref 1–41)
ANION GAP SERPL CALCULATED.3IONS-SCNC: 18.8 MMOL/L (ref 5–15)
AST SERPL-CCNC: 23 U/L (ref 1–40)
BACTERIA UR QL AUTO: ABNORMAL /HPF
BASOPHILS # BLD AUTO: 0.08 10*3/MM3 (ref 0–0.2)
BASOPHILS NFR BLD AUTO: 0.4 % (ref 0–1.5)
BILIRUB SERPL-MCNC: 0.2 MG/DL (ref 0–1.2)
BILIRUB UR QL STRIP: NEGATIVE
BUN SERPL-MCNC: 45 MG/DL (ref 8–23)
BUN/CREAT SERPL: 10.7 (ref 7–25)
CALCIUM SPEC-SCNC: 5.6 MG/DL (ref 8.6–10.5)
CHLORIDE SERPL-SCNC: 100 MMOL/L (ref 98–107)
CLARITY UR: CLEAR
CO2 SERPL-SCNC: 23.2 MMOL/L (ref 22–29)
COLOR UR: YELLOW
CREAT SERPL-MCNC: 4.2 MG/DL (ref 0.76–1.27)
D-LACTATE SERPL-SCNC: 1.5 MMOL/L (ref 0.5–2)
DEPRECATED RDW RBC AUTO: 45.1 FL (ref 37–54)
EGFRCR SERPLBLD CKD-EPI 2021: 13.5 ML/MIN/1.73
EOSINOPHIL # BLD AUTO: 0.12 10*3/MM3 (ref 0–0.4)
EOSINOPHIL NFR BLD AUTO: 0.6 % (ref 0.3–6.2)
ERYTHROCYTE [DISTWIDTH] IN BLOOD BY AUTOMATED COUNT: 13.9 % (ref 12.3–15.4)
GLOBULIN UR ELPH-MCNC: 3.2 GM/DL
GLUCOSE SERPL-MCNC: 105 MG/DL (ref 65–99)
GLUCOSE UR STRIP-MCNC: NEGATIVE MG/DL
HCT VFR BLD AUTO: 32.2 % (ref 37.5–51)
HGB BLD-MCNC: 11.3 G/DL (ref 13–17.7)
HGB UR QL STRIP.AUTO: NEGATIVE
HOLD SPECIMEN: NORMAL
HOLD SPECIMEN: NORMAL
HYALINE CASTS UR QL AUTO: ABNORMAL /LPF
IMM GRANULOCYTES # BLD AUTO: 0.17 10*3/MM3 (ref 0–0.05)
IMM GRANULOCYTES NFR BLD AUTO: 0.8 % (ref 0–0.5)
KETONES UR QL STRIP: NEGATIVE
LEUKOCYTE ESTERASE UR QL STRIP.AUTO: NEGATIVE
LIPASE SERPL-CCNC: 51 U/L (ref 13–60)
LYMPHOCYTES # BLD AUTO: 1.4 10*3/MM3 (ref 0.7–3.1)
LYMPHOCYTES NFR BLD AUTO: 6.9 % (ref 19.6–45.3)
MAGNESIUM SERPL-MCNC: 0.5 MG/DL (ref 1.6–2.4)
MAGNESIUM SERPL-MCNC: 1 MG/DL (ref 1.6–2.4)
MCH RBC QN AUTO: 31.3 PG (ref 26.6–33)
MCHC RBC AUTO-ENTMCNC: 35.1 G/DL (ref 31.5–35.7)
MCV RBC AUTO: 89.2 FL (ref 79–97)
MONOCYTES # BLD AUTO: 1.14 10*3/MM3 (ref 0.1–0.9)
MONOCYTES NFR BLD AUTO: 5.6 % (ref 5–12)
NEUTROPHILS NFR BLD AUTO: 17.37 10*3/MM3 (ref 1.7–7)
NEUTROPHILS NFR BLD AUTO: 85.7 % (ref 42.7–76)
NITRITE UR QL STRIP: NEGATIVE
NRBC BLD AUTO-RTO: 0 /100 WBC (ref 0–0.2)
NT-PROBNP SERPL-MCNC: 1227 PG/ML (ref 0–1800)
PH UR STRIP.AUTO: <=5 [PH] (ref 5–8)
PHOSPHATE SERPL-MCNC: 3.7 MG/DL (ref 2.5–4.5)
PLATELET # BLD AUTO: 443 10*3/MM3 (ref 140–450)
PMV BLD AUTO: 9.6 FL (ref 6–12)
POTASSIUM SERPL-SCNC: 3 MMOL/L (ref 3.5–5.2)
PROT SERPL-MCNC: 6.7 G/DL (ref 6–8.5)
PROT UR QL STRIP: ABNORMAL
QT INTERVAL: 440 MS
RBC # BLD AUTO: 3.61 10*6/MM3 (ref 4.14–5.8)
RBC # UR STRIP: ABNORMAL /HPF
REF LAB TEST METHOD: ABNORMAL
SODIUM SERPL-SCNC: 142 MMOL/L (ref 136–145)
SP GR UR STRIP: 1.02 (ref 1–1.03)
SQUAMOUS #/AREA URNS HPF: ABNORMAL /HPF
UROBILINOGEN UR QL STRIP: ABNORMAL
WBC # UR STRIP: ABNORMAL /HPF
WBC NRBC COR # BLD: 20.28 10*3/MM3 (ref 3.4–10.8)
WHOLE BLOOD HOLD COAG: NORMAL
WHOLE BLOOD HOLD SPECIMEN: NORMAL

## 2023-02-23 PROCEDURE — 83605 ASSAY OF LACTIC ACID: CPT

## 2023-02-23 PROCEDURE — 83735 ASSAY OF MAGNESIUM: CPT | Performed by: FAMILY MEDICINE

## 2023-02-23 PROCEDURE — 36415 COLL VENOUS BLD VENIPUNCTURE: CPT

## 2023-02-23 PROCEDURE — 83880 ASSAY OF NATRIURETIC PEPTIDE: CPT | Performed by: NURSE PRACTITIONER

## 2023-02-23 PROCEDURE — 84100 ASSAY OF PHOSPHORUS: CPT | Performed by: FAMILY MEDICINE

## 2023-02-23 PROCEDURE — 82306 VITAMIN D 25 HYDROXY: CPT | Performed by: INTERNAL MEDICINE

## 2023-02-23 PROCEDURE — 85025 COMPLETE CBC W/AUTO DIFF WBC: CPT

## 2023-02-23 PROCEDURE — 80053 COMPREHEN METABOLIC PANEL: CPT

## 2023-02-23 PROCEDURE — 0 POTASSIUM CHLORIDE 10 MEQ/100ML SOLUTION: Performed by: EMERGENCY MEDICINE

## 2023-02-23 PROCEDURE — 83690 ASSAY OF LIPASE: CPT

## 2023-02-23 PROCEDURE — 99223 1ST HOSP IP/OBS HIGH 75: CPT | Performed by: FAMILY MEDICINE

## 2023-02-23 PROCEDURE — 83735 ASSAY OF MAGNESIUM: CPT | Performed by: NURSE PRACTITIONER

## 2023-02-23 PROCEDURE — 93005 ELECTROCARDIOGRAM TRACING: CPT | Performed by: EMERGENCY MEDICINE

## 2023-02-23 PROCEDURE — 25010000002 MAGNESIUM SULFATE 2 GM/50ML SOLUTION: Performed by: EMERGENCY MEDICINE

## 2023-02-23 PROCEDURE — 25010000002 ONDANSETRON PER 1 MG: Performed by: EMERGENCY MEDICINE

## 2023-02-23 PROCEDURE — 81001 URINALYSIS AUTO W/SCOPE: CPT | Performed by: EMERGENCY MEDICINE

## 2023-02-23 PROCEDURE — 99285 EMERGENCY DEPT VISIT HI MDM: CPT

## 2023-02-23 PROCEDURE — 25010000002 CALCIUM GLUCONATE-NACL 1-0.675 GM/50ML-% SOLUTION: Performed by: NURSE PRACTITIONER

## 2023-02-23 RX ORDER — ACETAMINOPHEN 160 MG/5ML
650 SOLUTION ORAL EVERY 4 HOURS PRN
Status: DISCONTINUED | OUTPATIENT
Start: 2023-02-23 | End: 2023-02-25 | Stop reason: HOSPADM

## 2023-02-23 RX ORDER — HYDROCODONE BITARTRATE AND ACETAMINOPHEN 5; 325 MG/1; MG/1
1 TABLET ORAL EVERY 6 HOURS PRN
COMMUNITY
Start: 2023-02-14

## 2023-02-23 RX ORDER — SODIUM CHLORIDE, SODIUM LACTATE, POTASSIUM CHLORIDE, CALCIUM CHLORIDE 600; 310; 30; 20 MG/100ML; MG/100ML; MG/100ML; MG/100ML
100 INJECTION, SOLUTION INTRAVENOUS CONTINUOUS
Status: DISCONTINUED | OUTPATIENT
Start: 2023-02-23 | End: 2023-02-23

## 2023-02-23 RX ORDER — POTASSIUM CHLORIDE 750 MG/1
20 CAPSULE, EXTENDED RELEASE ORAL ONCE
Status: COMPLETED | OUTPATIENT
Start: 2023-02-23 | End: 2023-02-23

## 2023-02-23 RX ORDER — ACETAMINOPHEN 650 MG/1
650 SUPPOSITORY RECTAL EVERY 4 HOURS PRN
Status: DISCONTINUED | OUTPATIENT
Start: 2023-02-23 | End: 2023-02-25 | Stop reason: HOSPADM

## 2023-02-23 RX ORDER — POLYETHYLENE GLYCOL 3350 17 G/17G
17 POWDER, FOR SOLUTION ORAL DAILY PRN
Status: DISCONTINUED | OUTPATIENT
Start: 2023-02-23 | End: 2023-02-25 | Stop reason: HOSPADM

## 2023-02-23 RX ORDER — SODIUM CHLORIDE, SODIUM LACTATE, POTASSIUM CHLORIDE, CALCIUM CHLORIDE 600; 310; 30; 20 MG/100ML; MG/100ML; MG/100ML; MG/100ML
100 INJECTION, SOLUTION INTRAVENOUS CONTINUOUS
Status: ACTIVE | OUTPATIENT
Start: 2023-02-23 | End: 2023-02-24

## 2023-02-23 RX ORDER — BISACODYL 5 MG/1
5 TABLET, DELAYED RELEASE ORAL DAILY PRN
Status: DISCONTINUED | OUTPATIENT
Start: 2023-02-23 | End: 2023-02-25 | Stop reason: HOSPADM

## 2023-02-23 RX ORDER — MAGNESIUM SULFATE HEPTAHYDRATE 40 MG/ML
2 INJECTION, SOLUTION INTRAVENOUS ONCE
Status: COMPLETED | OUTPATIENT
Start: 2023-02-23 | End: 2023-02-23

## 2023-02-23 RX ORDER — CHOLECALCIFEROL (VITAMIN D3) 125 MCG
5 CAPSULE ORAL NIGHTLY PRN
Status: DISCONTINUED | OUTPATIENT
Start: 2023-02-23 | End: 2023-02-25 | Stop reason: HOSPADM

## 2023-02-23 RX ORDER — ONDANSETRON 2 MG/ML
4 INJECTION INTRAMUSCULAR; INTRAVENOUS ONCE
Status: COMPLETED | OUTPATIENT
Start: 2023-02-23 | End: 2023-02-23

## 2023-02-23 RX ORDER — SODIUM CHLORIDE 0.9 % (FLUSH) 0.9 %
10 SYRINGE (ML) INJECTION AS NEEDED
Status: DISCONTINUED | OUTPATIENT
Start: 2023-02-23 | End: 2023-02-25 | Stop reason: HOSPADM

## 2023-02-23 RX ORDER — POTASSIUM CHLORIDE 7.45 MG/ML
10 INJECTION INTRAVENOUS
Status: COMPLETED | OUTPATIENT
Start: 2023-02-23 | End: 2023-02-24

## 2023-02-23 RX ORDER — MULTIVITAMIN WITH IRON
250 TABLET ORAL EVERY OTHER DAY
COMMUNITY

## 2023-02-23 RX ORDER — ONDANSETRON 2 MG/ML
4 INJECTION INTRAMUSCULAR; INTRAVENOUS EVERY 6 HOURS PRN
Status: DISCONTINUED | OUTPATIENT
Start: 2023-02-23 | End: 2023-02-25 | Stop reason: HOSPADM

## 2023-02-23 RX ORDER — SODIUM CHLORIDE 9 MG/ML
40 INJECTION, SOLUTION INTRAVENOUS AS NEEDED
Status: DISCONTINUED | OUTPATIENT
Start: 2023-02-23 | End: 2023-02-25 | Stop reason: HOSPADM

## 2023-02-23 RX ORDER — MAGNESIUM SULFATE HEPTAHYDRATE 40 MG/ML
2 INJECTION, SOLUTION INTRAVENOUS ONCE
Status: DISCONTINUED | OUTPATIENT
Start: 2023-02-23 | End: 2023-02-23

## 2023-02-23 RX ORDER — SODIUM CHLORIDE 0.9 % (FLUSH) 0.9 %
10 SYRINGE (ML) INJECTION EVERY 12 HOURS SCHEDULED
Status: DISCONTINUED | OUTPATIENT
Start: 2023-02-23 | End: 2023-02-25 | Stop reason: HOSPADM

## 2023-02-23 RX ORDER — CALCIUM GLUCONATE 20 MG/ML
1 INJECTION, SOLUTION INTRAVENOUS ONCE
Status: COMPLETED | OUTPATIENT
Start: 2023-02-23 | End: 2023-02-23

## 2023-02-23 RX ORDER — BISACODYL 10 MG
10 SUPPOSITORY, RECTAL RECTAL DAILY PRN
Status: DISCONTINUED | OUTPATIENT
Start: 2023-02-23 | End: 2023-02-25 | Stop reason: HOSPADM

## 2023-02-23 RX ORDER — ACETAMINOPHEN 325 MG/1
650 TABLET ORAL EVERY 4 HOURS PRN
Status: DISCONTINUED | OUTPATIENT
Start: 2023-02-23 | End: 2023-02-25 | Stop reason: HOSPADM

## 2023-02-23 RX ORDER — AMOXICILLIN 250 MG
2 CAPSULE ORAL 2 TIMES DAILY
Status: DISCONTINUED | OUTPATIENT
Start: 2023-02-23 | End: 2023-02-25 | Stop reason: HOSPADM

## 2023-02-23 RX ADMIN — MAGNESIUM SULFATE HEPTAHYDRATE 2 G: 40 INJECTION, SOLUTION INTRAVENOUS at 16:36

## 2023-02-23 RX ADMIN — CALCIUM GLUCONATE 1 G: 20 INJECTION, SOLUTION INTRAVENOUS at 16:05

## 2023-02-23 RX ADMIN — SODIUM CHLORIDE, POTASSIUM CHLORIDE, SODIUM LACTATE AND CALCIUM CHLORIDE 100 ML/HR: 600; 310; 30; 20 INJECTION, SOLUTION INTRAVENOUS at 22:35

## 2023-02-23 RX ADMIN — ONDANSETRON 4 MG: 2 INJECTION INTRAMUSCULAR; INTRAVENOUS at 16:37

## 2023-02-23 RX ADMIN — POTASSIUM CHLORIDE 10 MEQ: 10 INJECTION, SOLUTION INTRAVENOUS at 16:49

## 2023-02-23 RX ADMIN — POTASSIUM CHLORIDE 20 MEQ: 10 CAPSULE, COATED, EXTENDED RELEASE ORAL at 16:47

## 2023-02-23 RX ADMIN — POTASSIUM CHLORIDE 10 MEQ: 10 INJECTION, SOLUTION INTRAVENOUS at 20:03

## 2023-02-23 RX ADMIN — SODIUM CHLORIDE 1000 ML: 9 INJECTION, SOLUTION INTRAVENOUS at 16:36

## 2023-02-23 RX ADMIN — SODIUM CHLORIDE, POTASSIUM CHLORIDE, SODIUM LACTATE AND CALCIUM CHLORIDE 100 ML/HR: 600; 310; 30; 20 INJECTION, SOLUTION INTRAVENOUS at 20:43

## 2023-02-23 RX ADMIN — POTASSIUM CHLORIDE 10 MEQ: 10 INJECTION, SOLUTION INTRAVENOUS at 18:25

## 2023-02-23 RX ADMIN — Medication 10 ML: at 20:52

## 2023-02-23 RX ADMIN — POTASSIUM CHLORIDE 10 MEQ: 10 INJECTION, SOLUTION INTRAVENOUS at 23:20

## 2023-02-24 PROBLEM — N18.9 ACUTE KIDNEY INJURY SUPERIMPOSED ON CHRONIC KIDNEY DISEASE (HCC): Status: ACTIVE | Noted: 2023-02-23

## 2023-02-24 LAB
25(OH)D3 SERPL-MCNC: 17.1 NG/ML (ref 30–100)
ANION GAP SERPL CALCULATED.3IONS-SCNC: 15.7 MMOL/L (ref 5–15)
BASOPHILS # BLD AUTO: 0.07 10*3/MM3 (ref 0–0.2)
BASOPHILS NFR BLD AUTO: 0.5 % (ref 0–1.5)
BUN SERPL-MCNC: 42 MG/DL (ref 8–23)
BUN/CREAT SERPL: 11.4 (ref 7–25)
CALCIUM SPEC-SCNC: 5.7 MG/DL (ref 8.6–10.5)
CHLORIDE SERPL-SCNC: 105 MMOL/L (ref 98–107)
CO2 SERPL-SCNC: 21.3 MMOL/L (ref 22–29)
CREAT SERPL-MCNC: 3.69 MG/DL (ref 0.76–1.27)
DEPRECATED RDW RBC AUTO: 46.6 FL (ref 37–54)
EGFRCR SERPLBLD CKD-EPI 2021: 15.8 ML/MIN/1.73
EOSINOPHIL # BLD AUTO: 0.15 10*3/MM3 (ref 0–0.4)
EOSINOPHIL NFR BLD AUTO: 1 % (ref 0.3–6.2)
ERYTHROCYTE [DISTWIDTH] IN BLOOD BY AUTOMATED COUNT: 14.1 % (ref 12.3–15.4)
FERRITIN SERPL-MCNC: 732.1 NG/ML (ref 30–400)
GLUCOSE SERPL-MCNC: 90 MG/DL (ref 65–99)
HCT VFR BLD AUTO: 27.5 % (ref 37.5–51)
HGB BLD-MCNC: 9.5 G/DL (ref 13–17.7)
IMM GRANULOCYTES # BLD AUTO: 0.11 10*3/MM3 (ref 0–0.05)
IMM GRANULOCYTES NFR BLD AUTO: 0.7 % (ref 0–0.5)
IRON 24H UR-MRATE: 38 MCG/DL (ref 59–158)
IRON SATN MFR SERPL: 22 % (ref 20–50)
LYMPHOCYTES # BLD AUTO: 1.16 10*3/MM3 (ref 0.7–3.1)
LYMPHOCYTES NFR BLD AUTO: 7.8 % (ref 19.6–45.3)
MCH RBC QN AUTO: 31.1 PG (ref 26.6–33)
MCHC RBC AUTO-ENTMCNC: 34.5 G/DL (ref 31.5–35.7)
MCV RBC AUTO: 90.2 FL (ref 79–97)
MONOCYTES # BLD AUTO: 1.03 10*3/MM3 (ref 0.1–0.9)
MONOCYTES NFR BLD AUTO: 6.9 % (ref 5–12)
NEUTROPHILS NFR BLD AUTO: 12.34 10*3/MM3 (ref 1.7–7)
NEUTROPHILS NFR BLD AUTO: 83.1 % (ref 42.7–76)
NRBC BLD AUTO-RTO: 0 /100 WBC (ref 0–0.2)
PLATELET # BLD AUTO: 337 10*3/MM3 (ref 140–450)
PMV BLD AUTO: 10 FL (ref 6–12)
POTASSIUM SERPL-SCNC: 3.4 MMOL/L (ref 3.5–5.2)
RBC # BLD AUTO: 3.05 10*6/MM3 (ref 4.14–5.8)
RETICS # AUTO: 0.04 10*6/MM3 (ref 0.02–0.13)
RETICS/RBC NFR AUTO: 1.24 % (ref 0.7–1.9)
SODIUM SERPL-SCNC: 142 MMOL/L (ref 136–145)
TIBC SERPL-MCNC: 171 MCG/DL (ref 298–536)
TRANSFERRIN SERPL-MCNC: 115 MG/DL (ref 200–360)
WBC NRBC COR # BLD: 14.86 10*3/MM3 (ref 3.4–10.8)

## 2023-02-24 PROCEDURE — 83540 ASSAY OF IRON: CPT | Performed by: INTERNAL MEDICINE

## 2023-02-24 PROCEDURE — 85025 COMPLETE CBC W/AUTO DIFF WBC: CPT | Performed by: FAMILY MEDICINE

## 2023-02-24 PROCEDURE — 80048 BASIC METABOLIC PNL TOTAL CA: CPT | Performed by: FAMILY MEDICINE

## 2023-02-24 PROCEDURE — 85045 AUTOMATED RETICULOCYTE COUNT: CPT | Performed by: INTERNAL MEDICINE

## 2023-02-24 PROCEDURE — 25010000002 CALCIUM GLUCONATE-NACL 1-0.675 GM/50ML-% SOLUTION: Performed by: PHYSICIAN ASSISTANT

## 2023-02-24 PROCEDURE — 99233 SBSQ HOSP IP/OBS HIGH 50: CPT | Performed by: INTERNAL MEDICINE

## 2023-02-24 PROCEDURE — 0 MAGNESIUM SULFATE 4 GM/100ML SOLUTION: Performed by: INTERNAL MEDICINE

## 2023-02-24 PROCEDURE — 25010000002 ONDANSETRON PER 1 MG: Performed by: FAMILY MEDICINE

## 2023-02-24 PROCEDURE — 25010000002 HEPARIN (PORCINE) PER 1000 UNITS: Performed by: INTERNAL MEDICINE

## 2023-02-24 PROCEDURE — 84466 ASSAY OF TRANSFERRIN: CPT | Performed by: INTERNAL MEDICINE

## 2023-02-24 PROCEDURE — 82728 ASSAY OF FERRITIN: CPT | Performed by: INTERNAL MEDICINE

## 2023-02-24 RX ORDER — MAGNESIUM SULFATE HEPTAHYDRATE 40 MG/ML
4 INJECTION, SOLUTION INTRAVENOUS ONCE
Status: COMPLETED | OUTPATIENT
Start: 2023-02-24 | End: 2023-02-24

## 2023-02-24 RX ORDER — HEPARIN SODIUM 5000 [USP'U]/ML
5000 INJECTION, SOLUTION INTRAVENOUS; SUBCUTANEOUS EVERY 12 HOURS SCHEDULED
Status: DISCONTINUED | OUTPATIENT
Start: 2023-02-24 | End: 2023-02-25 | Stop reason: HOSPADM

## 2023-02-24 RX ORDER — CALCIUM GLUCONATE 20 MG/ML
1 INJECTION, SOLUTION INTRAVENOUS ONCE
Status: COMPLETED | OUTPATIENT
Start: 2023-02-24 | End: 2023-02-24

## 2023-02-24 RX ORDER — POTASSIUM CHLORIDE 750 MG/1
20 CAPSULE, EXTENDED RELEASE ORAL ONCE
Status: COMPLETED | OUTPATIENT
Start: 2023-02-24 | End: 2023-02-24

## 2023-02-24 RX ADMIN — POTASSIUM CHLORIDE 20 MEQ: 10 CAPSULE, COATED, EXTENDED RELEASE ORAL at 09:34

## 2023-02-24 RX ADMIN — MAGNESIUM SULFATE 4 G: 4 INJECTION INTRAVENOUS at 09:34

## 2023-02-24 RX ADMIN — CALCIUM GLUCONATE 1 G: 20 INJECTION, SOLUTION INTRAVENOUS at 06:20

## 2023-02-24 RX ADMIN — SENNOSIDES AND DOCUSATE SODIUM 2 TABLET: 8.6; 5 TABLET ORAL at 21:08

## 2023-02-24 RX ADMIN — Medication 1 LOZENGE: at 15:38

## 2023-02-24 RX ADMIN — Medication 10 ML: at 21:08

## 2023-02-24 RX ADMIN — HEPARIN SODIUM 5000 UNITS: 5000 INJECTION INTRAVENOUS; SUBCUTANEOUS at 21:08

## 2023-02-24 RX ADMIN — ONDANSETRON 4 MG: 2 INJECTION INTRAMUSCULAR; INTRAVENOUS at 09:11

## 2023-02-24 RX ADMIN — Medication 10 ML: at 11:21

## 2023-02-24 RX ADMIN — SODIUM CHLORIDE, POTASSIUM CHLORIDE, SODIUM LACTATE AND CALCIUM CHLORIDE 100 ML/HR: 600; 310; 30; 20 INJECTION, SOLUTION INTRAVENOUS at 05:17

## 2023-02-25 ENCOUNTER — DOCUMENTATION (OUTPATIENT)
Dept: ADMINISTRATIVE | Facility: HOSPITAL | Age: 83
End: 2023-02-25
Payer: MEDICARE

## 2023-02-25 ENCOUNTER — READMISSION MANAGEMENT (OUTPATIENT)
Dept: CALL CENTER | Facility: HOSPITAL | Age: 83
End: 2023-02-25
Payer: MEDICARE

## 2023-02-25 VITALS
HEART RATE: 79 BPM | DIASTOLIC BLOOD PRESSURE: 49 MMHG | TEMPERATURE: 97.3 F | OXYGEN SATURATION: 98 % | HEIGHT: 66 IN | RESPIRATION RATE: 18 BRPM | BODY MASS INDEX: 21.19 KG/M2 | WEIGHT: 131.84 LBS | SYSTOLIC BLOOD PRESSURE: 138 MMHG

## 2023-02-25 PROBLEM — E43 SEVERE MALNUTRITION: Status: ACTIVE | Noted: 2023-02-25

## 2023-02-25 LAB
ANION GAP SERPL CALCULATED.3IONS-SCNC: 18.2 MMOL/L (ref 5–15)
BUN SERPL-MCNC: 38 MG/DL (ref 8–23)
BUN/CREAT SERPL: 12.5 (ref 7–25)
CA-I BLDA-SCNC: 0.9 MMOL/L (ref 1.13–1.32)
CALCIUM SPEC-SCNC: 7.3 MG/DL (ref 8.6–10.5)
CHLORIDE SERPL-SCNC: 104 MMOL/L (ref 98–107)
CO2 SERPL-SCNC: 20.8 MMOL/L (ref 22–29)
CREAT SERPL-MCNC: 3.03 MG/DL (ref 0.76–1.27)
DEPRECATED RDW RBC AUTO: 47.5 FL (ref 37–54)
EGFRCR SERPLBLD CKD-EPI 2021: 20 ML/MIN/1.73
ERYTHROCYTE [DISTWIDTH] IN BLOOD BY AUTOMATED COUNT: 14.2 % (ref 12.3–15.4)
GLUCOSE SERPL-MCNC: 114 MG/DL (ref 65–99)
HCT VFR BLD AUTO: 31.3 % (ref 37.5–51)
HGB BLD-MCNC: 10.7 G/DL (ref 13–17.7)
MAGNESIUM SERPL-MCNC: 1.3 MG/DL (ref 1.6–2.4)
MCH RBC QN AUTO: 31.1 PG (ref 26.6–33)
MCHC RBC AUTO-ENTMCNC: 34.2 G/DL (ref 31.5–35.7)
MCV RBC AUTO: 91 FL (ref 79–97)
PHOSPHATE SERPL-MCNC: 3.7 MG/DL (ref 2.5–4.5)
PLATELET # BLD AUTO: 407 10*3/MM3 (ref 140–450)
PMV BLD AUTO: 10 FL (ref 6–12)
POTASSIUM SERPL-SCNC: 3.6 MMOL/L (ref 3.5–5.2)
RBC # BLD AUTO: 3.44 10*6/MM3 (ref 4.14–5.8)
SODIUM SERPL-SCNC: 143 MMOL/L (ref 136–145)
WBC NRBC COR # BLD: 15.38 10*3/MM3 (ref 3.4–10.8)

## 2023-02-25 PROCEDURE — 80048 BASIC METABOLIC PNL TOTAL CA: CPT | Performed by: INTERNAL MEDICINE

## 2023-02-25 PROCEDURE — 83735 ASSAY OF MAGNESIUM: CPT | Performed by: INTERNAL MEDICINE

## 2023-02-25 PROCEDURE — 0 MAGNESIUM SULFATE 4 GM/100ML SOLUTION: Performed by: INTERNAL MEDICINE

## 2023-02-25 PROCEDURE — 82330 ASSAY OF CALCIUM: CPT | Performed by: INTERNAL MEDICINE

## 2023-02-25 PROCEDURE — 85027 COMPLETE CBC AUTOMATED: CPT | Performed by: INTERNAL MEDICINE

## 2023-02-25 PROCEDURE — 84100 ASSAY OF PHOSPHORUS: CPT | Performed by: INTERNAL MEDICINE

## 2023-02-25 RX ORDER — MAGNESIUM SULFATE HEPTAHYDRATE 40 MG/ML
4 INJECTION, SOLUTION INTRAVENOUS ONCE
Status: COMPLETED | OUTPATIENT
Start: 2023-02-25 | End: 2023-02-25

## 2023-02-25 RX ADMIN — MAGNESIUM SULFATE 4 G: 4 INJECTION INTRAVENOUS at 09:23

## 2023-02-25 NOTE — OUTREACH NOTE
Prep Survey    Flowsheet Row Responses   [facility] discharged from? [facility]   Is LACE score < 7 ? No   Eligibility Readm Mgmt   Discharge diagnosis Acute kidney injury superimposed on chronic kidney disease    Does the patient have one of the following disease processes/diagnoses(primary or secondary)? Other   Does the patient have Home health ordered? No   Is there a DME ordered? No   Prep survey completed? Yes          Hayley DUTTON - Registered Nurse

## 2023-02-28 ENCOUNTER — READMISSION MANAGEMENT (OUTPATIENT)
Dept: CALL CENTER | Facility: HOSPITAL | Age: 83
End: 2023-02-28
Payer: MEDICARE

## 2023-02-28 NOTE — OUTREACH NOTE
Medical Week 1 Survey    Flowsheet Row Responses   [facility] patient discharged from? [facility]   Does the patient have one of the following disease processes/diagnoses(primary or secondary)? Other   Week 1 attempt successful? No   Unsuccessful attempts Attempt 1          Karishma Gtz Registered Nurse

## 2023-03-07 ENCOUNTER — READMISSION MANAGEMENT (OUTPATIENT)
Dept: CALL CENTER | Facility: HOSPITAL | Age: 83
End: 2023-03-07
Payer: MEDICARE

## 2023-03-07 NOTE — OUTREACH NOTE
Medical Week 1 Survey    Flowsheet Row Responses   [facility] patient discharged from? [facility]   Does the patient have one of the following disease processes/diagnoses(primary or secondary)? Other   Week 1 attempt successful? Yes   Call start time 0854   Call end time 0857   Discharge diagnosis Acute kidney injury superimposed on chronic kidney disease    Person spoke with today (if not patient) and relationship pt   Meds reviewed with patient/caregiver? Yes   Is the patient having any side effects they believe may be caused by any medication additions or changes? No   Does the patient have all medications ordered at discharge? N/A   Is the patient taking all medications as directed (includes completed medication regime)? Yes   Does the patient have a primary care provider?  Yes   Does the patient have an appointment with their PCP within 7 days of discharge? Greater than 7 days   Nursing Interventions Verified appointment date/time/provider   Has the patient kept scheduled appointments due by today? N/A   Psychosocial issues? No   Did the patient receive a copy of their discharge instructions? Yes   Nursing interventions Reviewed instructions with patient   What is the patient's perception of their health status since discharge? Improving   Is the patient/caregiver able to teach back signs and symptoms related to disease process for when to call PCP? Yes   Is the patient/caregiver able to teach back signs and symptoms related to disease process for when to call 911? Yes   Is the patient/caregiver able to teach back the hierarchy of who to call/visit for symptoms/problems? PCP, Specialist, Home health nurse, Urgent Care, ED, 911 Yes   Week 1 call completed? Yes   Is the patient interested in additional calls from an ambulatory ?  NOTE:  applies to high risk patients requiring additional follow-up. No   Wrap up additional comments Pt states he is doing ok. Pt has a PCP upcoming appt, and advised to  make a fu appt with Nephrologist.          Karishma ZEPEDA - Registered Nurse

## 2023-03-14 ENCOUNTER — READMISSION MANAGEMENT (OUTPATIENT)
Dept: CALL CENTER | Facility: HOSPITAL | Age: 83
End: 2023-03-14
Payer: MEDICARE

## 2023-03-14 NOTE — OUTREACH NOTE
Medical Week 2 Survey    Flowsheet Row Responses   [facility] patient discharged from? [facility]   Does the patient have one of the following disease processes/diagnoses(primary or secondary)? Other   Week 2 attempt successful? Yes   Call start time 1112   Discharge diagnosis Acute kidney injury superimposed on chronic kidney disease    Call end time 1113   Meds reviewed with patient/caregiver? Yes   Is the patient having any side effects they believe may be caused by any medication additions or changes? No   Does the patient have all medications ordered at discharge? Yes   Is the patient taking all medications as directed (includes completed medication regime)? Yes   Does the patient have a primary care provider?  Yes   Does the patient have an appointment with their PCP within 7 days of discharge? Yes   Has the patient kept scheduled appointments due by today? N/A   Has home health visited the patient within 72 hours of discharge? N/A   Psychosocial issues? No   What is the patient's perception of their health status since discharge? Improving   Week 2 Call Completed? Yes   Graduated Yes          Ruthie Gtz Registered Nurse

## 2023-05-09 ENCOUNTER — HOSPITAL ENCOUNTER (EMERGENCY)
Facility: HOSPITAL | Age: 83
Discharge: HOME | End: 2023-05-09
Attending: EMERGENCY MEDICINE
Payer: MEDICARE

## 2023-05-09 VITALS
HEIGHT: 66 IN | WEIGHT: 130.07 LBS | DIASTOLIC BLOOD PRESSURE: 51 MMHG | TEMPERATURE: 99 F | RESPIRATION RATE: 16 BRPM | HEART RATE: 76 BPM | OXYGEN SATURATION: 95 % | SYSTOLIC BLOOD PRESSURE: 153 MMHG | BODY MASS INDEX: 20.9 KG/M2

## 2023-05-09 DIAGNOSIS — R33.8 ACUTE URINARY RETENTION: Primary | ICD-10-CM

## 2023-05-09 LAB
ALBUMIN SERPL-MCNC: 3.5 G/DL (ref 3.5–5.2)
ALBUMIN/GLOB SERPL: 1 G/DL
ALP SERPL-CCNC: 90 U/L (ref 39–117)
ALT SERPL W P-5'-P-CCNC: 16 U/L (ref 1–41)
ANION GAP SERPL CALCULATED.3IONS-SCNC: 13.3 MMOL/L (ref 5–15)
AST SERPL-CCNC: 17 U/L (ref 1–40)
BACTERIA UR QL AUTO: NORMAL /HPF
BASOPHILS # BLD AUTO: 0.08 10*3/MM3 (ref 0–0.2)
BASOPHILS NFR BLD AUTO: 0.5 % (ref 0–1.5)
BILIRUB SERPL-MCNC: 0.3 MG/DL (ref 0–1.2)
BILIRUB UR QL STRIP: NEGATIVE
BUN SERPL-MCNC: 34 MG/DL (ref 8–23)
BUN/CREAT SERPL: 10.8 (ref 7–25)
CALCIUM SPEC-SCNC: 9.3 MG/DL (ref 8.6–10.5)
CHLORIDE SERPL-SCNC: 98 MMOL/L (ref 98–107)
CLARITY UR: CLEAR
CO2 SERPL-SCNC: 25.7 MMOL/L (ref 22–29)
COLOR UR: YELLOW
CREAT SERPL-MCNC: 3.15 MG/DL (ref 0.76–1.27)
D-LACTATE SERPL-SCNC: 1.8 MMOL/L (ref 0.5–2)
DEPRECATED RDW RBC AUTO: 43.1 FL (ref 37–54)
EGFRCR SERPLBLD CKD-EPI 2021: 19.1 ML/MIN/1.73
EOSINOPHIL # BLD AUTO: 0.02 10*3/MM3 (ref 0–0.4)
EOSINOPHIL NFR BLD AUTO: 0.1 % (ref 0.3–6.2)
ERYTHROCYTE [DISTWIDTH] IN BLOOD BY AUTOMATED COUNT: 12.7 % (ref 12.3–15.4)
GLOBULIN UR ELPH-MCNC: 3.5 GM/DL
GLUCOSE SERPL-MCNC: 121 MG/DL (ref 65–99)
GLUCOSE UR STRIP-MCNC: NEGATIVE MG/DL
HCT VFR BLD AUTO: 33.3 % (ref 37.5–51)
HGB BLD-MCNC: 11.4 G/DL (ref 13–17.7)
HGB UR QL STRIP.AUTO: NEGATIVE
HOLD SPECIMEN: NORMAL
HOLD SPECIMEN: NORMAL
HYALINE CASTS UR QL AUTO: NORMAL /LPF
IMM GRANULOCYTES # BLD AUTO: 0.08 10*3/MM3 (ref 0–0.05)
IMM GRANULOCYTES NFR BLD AUTO: 0.5 % (ref 0–0.5)
KETONES UR QL STRIP: NEGATIVE
LEUKOCYTE ESTERASE UR QL STRIP.AUTO: NEGATIVE
LIPASE SERPL-CCNC: 56 U/L (ref 13–60)
LYMPHOCYTES # BLD AUTO: 1.14 10*3/MM3 (ref 0.7–3.1)
LYMPHOCYTES NFR BLD AUTO: 6.8 % (ref 19.6–45.3)
MCH RBC QN AUTO: 31.6 PG (ref 26.6–33)
MCHC RBC AUTO-ENTMCNC: 34.2 G/DL (ref 31.5–35.7)
MCV RBC AUTO: 92.2 FL (ref 79–97)
MONOCYTES # BLD AUTO: 1.5 10*3/MM3 (ref 0.1–0.9)
MONOCYTES NFR BLD AUTO: 9 % (ref 5–12)
NEUTROPHILS NFR BLD AUTO: 13.86 10*3/MM3 (ref 1.7–7)
NEUTROPHILS NFR BLD AUTO: 83.1 % (ref 42.7–76)
NITRITE UR QL STRIP: NEGATIVE
NRBC BLD AUTO-RTO: 0 /100 WBC (ref 0–0.2)
PH UR STRIP.AUTO: 7 [PH] (ref 5–8)
PLATELET # BLD AUTO: 321 10*3/MM3 (ref 140–450)
PMV BLD AUTO: 9.6 FL (ref 6–12)
POTASSIUM SERPL-SCNC: 4 MMOL/L (ref 3.5–5.2)
PROT SERPL-MCNC: 7 G/DL (ref 6–8.5)
PROT UR QL STRIP: ABNORMAL
RBC # BLD AUTO: 3.61 10*6/MM3 (ref 4.14–5.8)
RBC # UR STRIP: NORMAL /HPF
REF LAB TEST METHOD: NORMAL
SODIUM SERPL-SCNC: 137 MMOL/L (ref 136–145)
SP GR UR STRIP: 1.01 (ref 1–1.03)
SQUAMOUS #/AREA URNS HPF: NORMAL /HPF
TRANS CELLS #/AREA URNS HPF: NORMAL /HPF
UROBILINOGEN UR QL STRIP: ABNORMAL
WBC # UR STRIP: NORMAL /HPF
WBC NRBC COR # BLD: 16.68 10*3/MM3 (ref 3.4–10.8)
WHOLE BLOOD HOLD COAG: NORMAL
WHOLE BLOOD HOLD SPECIMEN: NORMAL

## 2023-05-09 PROCEDURE — 83605 ASSAY OF LACTIC ACID: CPT | Performed by: EMERGENCY MEDICINE

## 2023-05-09 PROCEDURE — 80053 COMPREHEN METABOLIC PANEL: CPT | Performed by: EMERGENCY MEDICINE

## 2023-05-09 PROCEDURE — 81001 URINALYSIS AUTO W/SCOPE: CPT | Performed by: EMERGENCY MEDICINE

## 2023-05-09 PROCEDURE — 96365 THER/PROPH/DIAG IV INF INIT: CPT

## 2023-05-09 PROCEDURE — 36415 COLL VENOUS BLD VENIPUNCTURE: CPT

## 2023-05-09 PROCEDURE — 85025 COMPLETE CBC W/AUTO DIFF WBC: CPT

## 2023-05-09 PROCEDURE — 99283 EMERGENCY DEPT VISIT LOW MDM: CPT

## 2023-05-09 PROCEDURE — 51798 US URINE CAPACITY MEASURE: CPT

## 2023-05-09 PROCEDURE — 51702 INSERT TEMP BLADDER CATH: CPT

## 2023-05-09 PROCEDURE — 83690 ASSAY OF LIPASE: CPT | Performed by: EMERGENCY MEDICINE

## 2023-05-09 PROCEDURE — 25010000002 CEFTRIAXONE PER 250 MG: Performed by: EMERGENCY MEDICINE

## 2023-05-09 RX ORDER — TAMSULOSIN HYDROCHLORIDE 0.4 MG/1
1 CAPSULE ORAL DAILY
Qty: 10 CAPSULE | Refills: 0 | Status: SHIPPED | OUTPATIENT
Start: 2023-05-09

## 2023-05-09 RX ORDER — CEFTRIAXONE SODIUM 1 G/50ML
1 INJECTION, SOLUTION INTRAVENOUS ONCE
Status: COMPLETED | OUTPATIENT
Start: 2023-05-09 | End: 2023-05-09

## 2023-05-09 RX ORDER — SODIUM CHLORIDE 0.9 % (FLUSH) 0.9 %
10 SYRINGE (ML) INJECTION AS NEEDED
Status: DISCONTINUED | OUTPATIENT
Start: 2023-05-09 | End: 2023-05-09 | Stop reason: HOSPADM

## 2023-05-09 RX ADMIN — CEFTRIAXONE SODIUM 1 G: 1 INJECTION, SOLUTION INTRAVENOUS at 16:23

## 2023-05-09 RX ADMIN — SODIUM CHLORIDE 1000 ML: 9 INJECTION, SOLUTION INTRAVENOUS at 16:23

## 2023-05-09 NOTE — DISCHARGE INSTRUCTIONS
Drink plenty of fluids.  Take medication as directed.  Return for worsening symptoms.  Follow-up with your doctor or Dr. Merlos in 2 days if no better.

## 2023-05-09 NOTE — ED PROVIDER NOTES
Time: 2:49 PM EDT  Date of encounter:  5/9/2023  Independent Historian/Clinical History and Information was obtained by:   Patient and Family  Chief Complaint: Urinary retention    History is limited by: N/A    History of Present Illness:  Patient is a 82 y.o. year old male who presents to the emergency department for evaluation of urinary retention since last night.  Patient states he is able to void but very little.  The patient does complain of a low-grade fever and he was treated 2 days ago at a different hospital for acute bronchitis.  He has had some runny nose congestion and a cough.  He has had no vomiting or diarrhea.  He states that he has been eating and drinking well.  He has no abdominal pain and no back pain.  The patient also states that he was recently taking an anti histamine recently and he was been taking a lot of it which could have induced some of his difficulty urinating.    HPI    Patient Care Team  Primary Care Provider: Allison Villafana MD     Past Medical History:     Allergies   Allergen Reactions   • Aspirin Other (See Comments)      thins blood     Past Medical History:   Diagnosis Date   • Hyperlipidemia    • Hypertension    • Renal disorder      Past Surgical History:   Procedure Laterality Date   • TONSILLECTOMY       No family history on file.    Home Medications:  Prior to Admission medications    Medication Sig Start Date End Date Taking? Authorizing Provider   amLODIPine (NORVASC) 10 MG tablet Take 10 mg by mouth Daily.    Cholo Jaime MD   HYDROcodone-acetaminophen (NORCO) 5-325 MG per tablet Take 1 tablet by mouth Every 6 (Six) Hours As Needed. 2/14/23   Cholo Jaime MD   lisinopril-hydrochlorothiazide (PRINZIDE,ZESTORETIC) 20-12.5 MG per tablet Take 1 tablet by mouth Daily.    Cholo Jaime MD   Magnesium 250 MG tablet Take 250 mg by mouth Every Other Day.    Cholo Jaime MD   omeprazole (priLOSEC) 40 MG capsule Take 40 mg by mouth Daily.  "7/2/21   ProviderCholo MD   pravastatin (PRAVACHOL) 40 MG tablet Take 40 mg by mouth Daily.    Provider, MD Cholo        Social History:   Social History     Tobacco Use   • Smoking status: Former     Years: 3.00     Types: Cigarettes     Quit date: 7/14/2019     Years since quitting: 3.8   • Smokeless tobacco: Never   Vaping Use   • Vaping Use: Never used   Substance Use Topics   • Alcohol use: Not Currently   • Drug use: Never         Review of Systems:  Review of Systems   Constitutional: Positive for fever. Negative for chills.        Subjective fever   HENT: Negative for congestion, ear pain and sore throat.    Eyes: Negative for pain.   Respiratory: Negative for cough, chest tightness and shortness of breath.    Cardiovascular: Negative for chest pain.   Gastrointestinal: Negative for abdominal pain, diarrhea, nausea and vomiting.   Genitourinary: Positive for difficulty urinating and dysuria. Negative for flank pain and hematuria.   Musculoskeletal: Negative for joint swelling.   Skin: Negative for pallor.   Neurological: Negative for seizures and headaches.   All other systems reviewed and are negative.       Physical Exam:  /51   Pulse 76   Temp 99 °F (37.2 °C) (Oral)   Resp 16   Ht 167.6 cm (66\")   Wt 59 kg (130 lb 1.1 oz)   SpO2 95%   BMI 20.99 kg/m²     Physical Exam  Vitals and nursing note reviewed.   Constitutional:       General: He is not in acute distress.     Appearance: Normal appearance. He is not toxic-appearing.   HENT:      Head: Normocephalic and atraumatic.      Mouth/Throat:      Mouth: Mucous membranes are moist.   Eyes:      General: No scleral icterus.     Extraocular Movements: Extraocular movements intact.      Conjunctiva/sclera: Conjunctivae normal.   Cardiovascular:      Rate and Rhythm: Normal rate and regular rhythm.      Pulses: Normal pulses.      Heart sounds: Normal heart sounds.   Pulmonary:      Effort: Pulmonary effort is normal. No respiratory " distress.      Breath sounds: Normal breath sounds.   Abdominal:      General: Abdomen is flat. There is no distension.      Palpations: Abdomen is soft.      Tenderness: There is no abdominal tenderness. There is no right CVA tenderness, left CVA tenderness, guarding or rebound.   Genitourinary:     Penis: Normal.    Musculoskeletal:         General: Normal range of motion.      Cervical back: Normal range of motion and neck supple.   Skin:     General: Skin is warm and dry.      Coloration: Skin is not cyanotic.   Neurological:      Mental Status: He is alert and oriented to person, place, and time. Mental status is at baseline.   Psychiatric:         Attention and Perception: Attention and perception normal.         Mood and Affect: Mood normal.                  Procedures:  Procedures      Medical Decision Making:      Comorbidities that affect care:    Hypertension    External Notes reviewed:    Previous Clinic Note: Recent visit for bronchitis at separate clinic/emergency department      The following orders were placed and all results were independently analyzed by me:  Orders Placed This Encounter   Procedures   • [place] Draw   • Comprehensive Metabolic Panel   • Lipase   • Lactic Acid, Plasma   • CBC Auto Differential   • Urinalysis With Culture If Indicated - Urine, Catheter   • Urinalysis, Microscopic Only - Urine, Clean Catch   • Bladder scan   • Undress & Gown   • Insert Indwelling Urinary Catheter   • CBC & Differential   • Green Top (Gel)   • Lavender Top   • Gold Top - SST   • Light Blue Top       Medications Given in the Emergency Department:  Medications   sodium chloride 0.9 % bolus 1,000 mL (0 mL Intravenous Stopped 5/9/23 1743)   cefTRIAXone (ROCEPHIN) IVPB 1 g (0 g Intravenous Stopped 5/9/23 1655)        ED Course:    ED Course as of 05/12/23 1938   Tue May 09, 2023   0620 --- PROVIDER IN TRIAGE NOTE ---    The patient was evaluated by Osmar bhatt in triage. Orders were placed and the  patient is currently awaiting disposition.    [AJ]      ED Course User Index  [AJ] Osmar Palacios PA-C       Labs:    Results for orders placed or performed during the hospital encounter of 05/09/23   Comprehensive Metabolic Panel    Specimen: Blood   Result Value Ref Range    Glucose 121 (H) 65 - 99 mg/dL    BUN 34 (H) 8 - 23 mg/dL    Creatinine 3.15 (H) 0.76 - 1.27 mg/dL    Sodium 137 136 - 145 mmol/L    Potassium 4.0 3.5 - 5.2 mmol/L    Chloride 98 98 - 107 mmol/L    CO2 25.7 22.0 - 29.0 mmol/L    Calcium 9.3 8.6 - 10.5 mg/dL    Total Protein 7.0 6.0 - 8.5 g/dL    Albumin 3.5 3.5 - 5.2 g/dL    ALT (SGPT) 16 1 - 41 U/L    AST (SGOT) 17 1 - 40 U/L    Alkaline Phosphatase 90 39 - 117 U/L    Total Bilirubin 0.3 0.0 - 1.2 mg/dL    Globulin 3.5 gm/dL    A/G Ratio 1.0 g/dL    BUN/Creatinine Ratio 10.8 7.0 - 25.0    Anion Gap 13.3 5.0 - 15.0 mmol/L    eGFR 19.1 (L) >60.0 mL/min/1.73   Lipase    Specimen: Blood   Result Value Ref Range    Lipase 56 13 - 60 U/L   Lactic Acid, Plasma    Specimen: Arm, Right; Blood   Result Value Ref Range    Lactate 1.8 0.5 - 2.0 mmol/L   CBC Auto Differential    Specimen: Blood   Result Value Ref Range    WBC 16.68 (H) 3.40 - 10.80 10*3/mm3    RBC 3.61 (L) 4.14 - 5.80 10*6/mm3    Hemoglobin 11.4 (L) 13.0 - 17.7 g/dL    Hematocrit 33.3 (L) 37.5 - 51.0 %    MCV 92.2 79.0 - 97.0 fL    MCH 31.6 26.6 - 33.0 pg    MCHC 34.2 31.5 - 35.7 g/dL    RDW 12.7 12.3 - 15.4 %    RDW-SD 43.1 37.0 - 54.0 fl    MPV 9.6 6.0 - 12.0 fL    Platelets 321 140 - 450 10*3/mm3    Neutrophil % 83.1 (H) 42.7 - 76.0 %    Lymphocyte % 6.8 (L) 19.6 - 45.3 %    Monocyte % 9.0 5.0 - 12.0 %    Eosinophil % 0.1 (L) 0.3 - 6.2 %    Basophil % 0.5 0.0 - 1.5 %    Immature Grans % 0.5 0.0 - 0.5 %    Neutrophils, Absolute 13.86 (H) 1.70 - 7.00 10*3/mm3    Lymphocytes, Absolute 1.14 0.70 - 3.10 10*3/mm3    Monocytes, Absolute 1.50 (H) 0.10 - 0.90 10*3/mm3    Eosinophils, Absolute 0.02 0.00 - 0.40 10*3/mm3    Basophils,  Absolute 0.08 0.00 - 0.20 10*3/mm3    Immature Grans, Absolute 0.08 (H) 0.00 - 0.05 10*3/mm3    nRBC 0.0 0.0 - 0.2 /100 WBC   Urinalysis With Culture If Indicated - Indwelling Urethral Catheter    Specimen: Indwelling Urethral Catheter; Urine   Result Value Ref Range    Color, UA Yellow Yellow, Straw    Appearance, UA Clear Clear    pH, UA 7.0 5.0 - 8.0    Specific Gravity, UA 1.013 1.005 - 1.030    Glucose, UA Negative Negative    Ketones, UA Negative Negative    Bilirubin, UA Negative Negative    Blood, UA Negative Negative    Protein, UA >=300 mg/dL (3+) (A) Negative    Leuk Esterase, UA Negative Negative    Nitrite, UA Negative Negative    Urobilinogen, UA 0.2 E.U./dL 0.2 - 1.0 E.U./dL   Urinalysis, Microscopic Only - Indwelling Urethral Catheter    Specimen: Indwelling Urethral Catheter; Urine   Result Value Ref Range    RBC, UA None Seen None Seen /HPF    WBC, UA None Seen None Seen /HPF    Bacteria, UA None Seen None Seen /HPF    Squamous Epithelial Cells, UA 0-2 None Seen, 0-2 /HPF    Transitional Epithelial Cells, UA 0-2 0 - 2 /HPF    Hyaline Casts, UA None Seen None Seen /LPF    Methodology Manual Light Microscopy    Green Top (Gel)   Result Value Ref Range    Extra Tube Hold for add-ons.    Lavender Top   Result Value Ref Range    Extra Tube hold for add-on    Gold Top - SST   Result Value Ref Range    Extra Tube Hold for add-ons.    Light Blue Top   Result Value Ref Range    Extra Tube Hold for add-ons.          Lab Results (last 24 hours)     ** No results found for the last 24 hours. **           Imaging:    XR chest 1 view    Result Date: 5/7/2023  Narrative: INDICATION:   Cough, fever. COMPARISON:   None. FINDINGS: PA and lateral views of the chest.   Hyperinflated lungs suggestive of emphysematous changes. Minimal pleural thickening/fluid along the right horizontal fissure. Heart and mediastinum are of normal size. Degenerative changes are in the spine and right shoulder.   IMPRESSION: Emphysematous  lung changes without superimposed acute pulmonary abnormality Dictated by: Octavio Antonio M.D. Signed by Octavio Antonio M.D. on 5/7/2023 7:09 PM ##### Final ##### Dictated by:    OCTAVIO ANTONIO MD-RAD Dictated DT/TM: 05/07/2023 7:09 pm Interpreted and electronically signed by:  OCTAVIO ANTONIO MD-RAD Signed DT/TM:  05/07/2023 7:09 pm      No Radiology Exams Resulted Within Past 24 Hours      Differential Diagnosis and Discussion:    Dysuria: Differential diagnosis includes but is not limited to urethritis, cystitis, pyelonephritis, ureteral calculi, neoplasm, chemical irritant, urethral stricture, and trauma    All labs were reviewed and interpreted by me.    MDM         Patient Care Considerations:    CT ABDOMEN AND PELVIS: I considered ordering a CT scan of the abdomen and pelvis however Patient currently has no abdominal or back pain or tenderness.      Consultants/Shared Management Plan:    None    Social Determinants of Health:    Patient is independent, reliable, and has access to care.       Disposition and Care Coordination:    Discharged: The patient is suitable and stable for discharge with no need for consideration of observation or admission.    I have explained discharge medications and the need for follow up with the patient/caretakers. This was also printed in the discharge instructions. Patient was discharged with the following medications and follow up:      Medication List      New Prescriptions    tamsulosin 0.4 MG capsule 24 hr capsule  Commonly known as: FLOMAX  Take 1 capsule by mouth Daily.           Where to Get Your Medications      These medications were sent to [facility] [id number] - [place], [place] - [address] [place] [address] - [phone] [facility] [phone] [address] [address]    Phone: [phone]   · tamsulosin 0.4 MG capsule 24 hr capsule      Allison Villafana MD  [address] [facility] [address] [phone]    In 2 days      Luis Merlos,  MD  [address] [place] [address]  [phone]    In 2 days  If no better       Final diagnoses:   Acute urinary retention        ED Disposition     ED Disposition   Discharge    Condition   Stable    Comment   --             This medical record created using voice recognition software.           Alex Cohen DO  05/12/23 1938

## 2023-05-20 LAB
ALBUMIN SERPL-MCNC: 3.3 G/DL (ref 3.5–5.2)
ALBUMIN/GLOB SERPL: 0.9 G/DL
ALP SERPL-CCNC: 79 U/L (ref 39–117)
ALT SERPL W P-5'-P-CCNC: 14 U/L (ref 1–41)
ANION GAP SERPL CALCULATED.3IONS-SCNC: 10 MMOL/L (ref 5–15)
AST SERPL-CCNC: 18 U/L (ref 1–40)
BASOPHILS # BLD AUTO: 0.07 10*3/MM3 (ref 0–0.2)
BASOPHILS NFR BLD AUTO: 0.8 % (ref 0–1.5)
BILIRUB SERPL-MCNC: 0.2 MG/DL (ref 0–1.2)
BUN SERPL-MCNC: 31 MG/DL (ref 8–23)
BUN/CREAT SERPL: 10.7 (ref 7–25)
CALCIUM SPEC-SCNC: 9.2 MG/DL (ref 8.6–10.5)
CHLORIDE SERPL-SCNC: 104 MMOL/L (ref 98–107)
CO2 SERPL-SCNC: 27 MMOL/L (ref 22–29)
CREAT SERPL-MCNC: 2.89 MG/DL (ref 0.76–1.27)
D-LACTATE SERPL-SCNC: 1.4 MMOL/L (ref 0.5–2)
DEPRECATED RDW RBC AUTO: 42.7 FL (ref 37–54)
EGFRCR SERPLBLD CKD-EPI 2021: 21.2 ML/MIN/1.73
EOSINOPHIL # BLD AUTO: 0.37 10*3/MM3 (ref 0–0.4)
EOSINOPHIL NFR BLD AUTO: 4 % (ref 0.3–6.2)
ERYTHROCYTE [DISTWIDTH] IN BLOOD BY AUTOMATED COUNT: 12.4 % (ref 12.3–15.4)
GLOBULIN UR ELPH-MCNC: 3.6 GM/DL
GLUCOSE SERPL-MCNC: 124 MG/DL (ref 65–99)
HCT VFR BLD AUTO: 34.2 % (ref 37.5–51)
HGB BLD-MCNC: 11.4 G/DL (ref 13–17.7)
HOLD SPECIMEN: NORMAL
HOLD SPECIMEN: NORMAL
IMM GRANULOCYTES # BLD AUTO: 0.04 10*3/MM3 (ref 0–0.05)
IMM GRANULOCYTES NFR BLD AUTO: 0.4 % (ref 0–0.5)
LIPASE SERPL-CCNC: 119 U/L (ref 13–60)
LYMPHOCYTES # BLD AUTO: 1.25 10*3/MM3 (ref 0.7–3.1)
LYMPHOCYTES NFR BLD AUTO: 13.6 % (ref 19.6–45.3)
MCH RBC QN AUTO: 31.1 PG (ref 26.6–33)
MCHC RBC AUTO-ENTMCNC: 33.3 G/DL (ref 31.5–35.7)
MCV RBC AUTO: 93.4 FL (ref 79–97)
MONOCYTES # BLD AUTO: 0.75 10*3/MM3 (ref 0.1–0.9)
MONOCYTES NFR BLD AUTO: 8.2 % (ref 5–12)
NEUTROPHILS NFR BLD AUTO: 6.72 10*3/MM3 (ref 1.7–7)
NEUTROPHILS NFR BLD AUTO: 73 % (ref 42.7–76)
NRBC BLD AUTO-RTO: 0 /100 WBC (ref 0–0.2)
PLATELET # BLD AUTO: 374 10*3/MM3 (ref 140–450)
PMV BLD AUTO: 8.5 FL (ref 6–12)
POTASSIUM SERPL-SCNC: 4.6 MMOL/L (ref 3.5–5.2)
PROT SERPL-MCNC: 6.9 G/DL (ref 6–8.5)
RBC # BLD AUTO: 3.66 10*6/MM3 (ref 4.14–5.8)
SODIUM SERPL-SCNC: 141 MMOL/L (ref 136–145)
WBC NRBC COR # BLD: 9.2 10*3/MM3 (ref 3.4–10.8)
WHOLE BLOOD HOLD COAG: NORMAL
WHOLE BLOOD HOLD SPECIMEN: NORMAL

## 2023-05-20 PROCEDURE — 83605 ASSAY OF LACTIC ACID: CPT

## 2023-05-20 PROCEDURE — 99283 EMERGENCY DEPT VISIT LOW MDM: CPT

## 2023-05-20 PROCEDURE — 36415 COLL VENOUS BLD VENIPUNCTURE: CPT

## 2023-05-20 PROCEDURE — 83690 ASSAY OF LIPASE: CPT

## 2023-05-20 PROCEDURE — 85025 COMPLETE CBC W/AUTO DIFF WBC: CPT

## 2023-05-20 PROCEDURE — 80053 COMPREHEN METABOLIC PANEL: CPT

## 2023-05-20 RX ORDER — SODIUM CHLORIDE 0.9 % (FLUSH) 0.9 %
10 SYRINGE (ML) INJECTION AS NEEDED
Status: DISCONTINUED | OUTPATIENT
Start: 2023-05-20 | End: 2023-05-21 | Stop reason: HOSPADM

## 2023-05-21 ENCOUNTER — HOSPITAL ENCOUNTER (EMERGENCY)
Facility: HOSPITAL | Age: 83
Discharge: HOME | End: 2023-05-21
Attending: EMERGENCY MEDICINE | Admitting: EMERGENCY MEDICINE
Payer: MEDICARE

## 2023-05-21 ENCOUNTER — APPOINTMENT (OUTPATIENT)
Dept: CT IMAGING | Facility: HOSPITAL | Age: 83
End: 2023-05-21
Payer: MEDICARE

## 2023-05-21 VITALS
DIASTOLIC BLOOD PRESSURE: 61 MMHG | SYSTOLIC BLOOD PRESSURE: 158 MMHG | OXYGEN SATURATION: 99 % | TEMPERATURE: 98.9 F | BODY MASS INDEX: 21.08 KG/M2 | HEART RATE: 80 BPM | HEIGHT: 66 IN | WEIGHT: 131.17 LBS | RESPIRATION RATE: 16 BRPM

## 2023-05-21 DIAGNOSIS — R30.0 DYSURIA: Primary | ICD-10-CM

## 2023-05-21 DIAGNOSIS — B37.2 CUTANEOUS CANDIDIASIS: ICD-10-CM

## 2023-05-21 LAB
BACTERIA UR QL AUTO: ABNORMAL /HPF
BILIRUB UR QL STRIP: NEGATIVE
CLARITY UR: CLEAR
COLOR UR: YELLOW
GLUCOSE UR STRIP-MCNC: NEGATIVE MG/DL
HGB UR QL STRIP.AUTO: NEGATIVE
HYALINE CASTS UR QL AUTO: ABNORMAL /LPF
KETONES UR QL STRIP: NEGATIVE
LEUKOCYTE ESTERASE UR QL STRIP.AUTO: NEGATIVE
NITRITE UR QL STRIP: NEGATIVE
PH UR STRIP.AUTO: 7 [PH] (ref 5–8)
PROT UR QL STRIP: ABNORMAL
RBC # UR STRIP: ABNORMAL /HPF
REF LAB TEST METHOD: ABNORMAL
SP GR UR STRIP: 1.01 (ref 1–1.03)
SQUAMOUS #/AREA URNS HPF: ABNORMAL /HPF
UROBILINOGEN UR QL STRIP: ABNORMAL
WBC # UR STRIP: ABNORMAL /HPF

## 2023-05-21 PROCEDURE — 74176 CT ABD & PELVIS W/O CONTRAST: CPT

## 2023-05-21 PROCEDURE — 81001 URINALYSIS AUTO W/SCOPE: CPT | Performed by: EMERGENCY MEDICINE

## 2023-05-21 RX ORDER — NYSTATIN AND TRIAMCINOLONE ACETONIDE 100000; 1 [USP'U]/G; MG/G
1 OINTMENT TOPICAL 2 TIMES DAILY
Qty: 15 G | Refills: 0 | Status: SHIPPED | OUTPATIENT
Start: 2023-05-21

## 2023-05-21 RX ORDER — ACETAMINOPHEN 325 MG/1
975 TABLET ORAL ONCE
Status: COMPLETED | OUTPATIENT
Start: 2023-05-21 | End: 2023-05-21

## 2023-05-21 RX ORDER — NYSTATIN 100000 U/G
1 CREAM TOPICAL ONCE
Status: COMPLETED | OUTPATIENT
Start: 2023-05-21 | End: 2023-05-21

## 2023-05-21 RX ADMIN — ACETAMINOPHEN 975 MG: 325 TABLET ORAL at 06:05

## 2023-05-21 RX ADMIN — NYSTATIN 1 APPLICATION: 100000 CREAM TOPICAL at 06:06

## 2023-05-21 NOTE — ED PROVIDER NOTES
Time: 10:37 PM EDT  Date of encounter:  5/20/2023  Independent Historian/Clinical History and Information was obtained by:   Patient  Chief Complaint   Patient presents with   • Abdominal Pain       History is limited by: N/A    History of Present Illness:  Patient is a 82 y.o. year old male who presents to the emergency department for evaluation of lower abdominal pain constipation.  Patient states he was seen here the other day for acute urinary tension and ever since he was seen and had a catheter placed he has had some lower abdominal pain.  Patient denies nausea, vomiting, diarrhea.  (Provider in triage, Cayden Bass PA-C)    HPI    Patient Care Team  Primary Care Provider: Allison Villafana MD    Past Medical History:     Allergies   Allergen Reactions   • Aspirin Other (See Comments)      thins blood     Past Medical History:   Diagnosis Date   • Hyperlipidemia    • Hypertension    • Renal disorder      Past Surgical History:   Procedure Laterality Date   • TONSILLECTOMY       No family history on file.    Home Medications:  Prior to Admission medications    Medication Sig Start Date End Date Taking? Authorizing Provider   amLODIPine (NORVASC) 10 MG tablet Take 10 mg by mouth Daily.    Cholo Jaime MD   HYDROcodone-acetaminophen (NORCO) 5-325 MG per tablet Take 1 tablet by mouth Every 6 (Six) Hours As Needed. 2/14/23   Cholo Jaime MD   lisinopril-hydrochlorothiazide (PRINZIDE,ZESTORETIC) 20-12.5 MG per tablet Take 1 tablet by mouth Daily.    Cholo Jaime MD   Magnesium 250 MG tablet Take 250 mg by mouth Every Other Day.    Cholo Jaime MD   omeprazole (priLOSEC) 40 MG capsule Take 40 mg by mouth Daily. 7/2/21   Cholo Jaime MD   pravastatin (PRAVACHOL) 40 MG tablet Take 40 mg by mouth Daily.    Cholo Jaime MD   tamsulosin (FLOMAX) 0.4 MG capsule 24 hr capsule Take 1 capsule by mouth Daily. 5/9/23   Alex Cohen DO        Social History:   Social  "History     Tobacco Use   • Smoking status: Former     Years: 3.00     Types: Cigarettes     Quit date: 7/14/2019     Years since quitting: 3.8   • Smokeless tobacco: Never   Vaping Use   • Vaping Use: Never used   Substance Use Topics   • Alcohol use: Not Currently   • Drug use: Never         Review of Systems:  Review of Systems   Constitutional: Negative for chills and fever.   HENT: Negative for congestion, ear pain and sore throat.    Eyes: Negative for pain.   Respiratory: Negative for cough, chest tightness and shortness of breath.    Cardiovascular: Negative for chest pain.   Gastrointestinal: Positive for abdominal pain and constipation. Negative for diarrhea, nausea and vomiting.   Genitourinary: Negative for flank pain and hematuria.   Musculoskeletal: Negative for joint swelling.   Skin: Positive for rash. Negative for pallor.   Neurological: Negative for seizures and headaches.   All other systems reviewed and are negative.       Physical Exam:  /61   Pulse 80   Temp 98.9 °F (37.2 °C) (Oral)   Resp 16   Ht 167.6 cm (66\")   Wt 59.5 kg (131 lb 2.8 oz)   SpO2 99%   BMI 21.17 kg/m²     Physical Exam  Vitals and nursing note reviewed.   Constitutional:       General: He is not in acute distress.     Appearance: Normal appearance. He is not toxic-appearing.   HENT:      Head: Normocephalic and atraumatic.      Jaw: There is normal jaw occlusion.   Eyes:      General: Lids are normal.      Extraocular Movements: Extraocular movements intact.      Conjunctiva/sclera: Conjunctivae normal.      Pupils: Pupils are equal, round, and reactive to light.   Cardiovascular:      Rate and Rhythm: Normal rate and regular rhythm.      Pulses: Normal pulses.      Heart sounds: Normal heart sounds.   Pulmonary:      Effort: Pulmonary effort is normal. No respiratory distress.      Breath sounds: Normal breath sounds. No wheezing or rhonchi.   Abdominal:      General: Abdomen is flat. There is no distension.      " Palpations: Abdomen is soft.      Tenderness: There is abdominal tenderness in the suprapubic area. There is no guarding or rebound.   Genitourinary:     Comments: Has a erythematous maculopapular rash with confluence around his gluteal fold and gluteal cleft into his perineum with associated satellite lesions consistent with a candidal rash.  Musculoskeletal:         General: Normal range of motion.      Cervical back: Normal range of motion and neck supple.      Right lower leg: No edema.      Left lower leg: No edema.   Skin:     General: Skin is warm and dry.      Coloration: Skin is not cyanotic.   Neurological:      Mental Status: He is alert and oriented to person, place, and time. Mental status is at baseline.   Psychiatric:         Attention and Perception: Attention and perception normal.         Mood and Affect: Mood normal.                  Procedures:  Procedures      Medical Decision Making:      Comorbidities that affect care:    Chronic Kidney Disease, Hypertension    External Notes reviewed:    Previous ED Note: Seen for acute urinary retention      The following orders were placed and all results were independently analyzed by me:  Orders Placed This Encounter   Procedures   • CT Abdomen Pelvis Without Contrast   • [place] Draw   • Comprehensive Metabolic Panel   • Lipase   • Urinalysis With Microscopic If Indicated (No Culture) - Urine, Clean Catch   • Lactic Acid, Plasma   • CBC Auto Differential   • Urinalysis, Microscopic Only - Urine, Clean Catch   • NPO Diet NPO Type: Strict NPO   • Undress & Gown   • Insert Peripheral IV   • CBC & Differential   • Green Top (Gel)   • Lavender Top   • Gold Top - SST   • Light Blue Top       Medications Given in the Emergency Department:  Medications   sodium chloride 0.9 % flush 10 mL (has no administration in time range)   nystatin (MYCOSTATIN) 865668 UNIT/GM cream 1 application (1 application Topical Given 5/21/23 0606)   acetaminophen (TYLENOL) tablet 975  mg (975 mg Oral Given 5/21/23 0605)        ED Course:    The patient was initially evaluated in the triage area where orders were placed. The patient was later dispositioned by Dhaval Gallegos MD.      The patient was advised to stay for completion of workup which includes but is not limited to communication of labs and radiological results, reassessment and plan. The patient was advised that leaving prior to disposition by a provider could result in critical findings that are not communicated to the patient.     ED Course as of 05/21/23 0749   Sat May 20, 2023   2238 PROVIDER IN TRIAGE  Patient was evaluated by me in triage, Cayden Bass PA-C.  Orders were placed and patient is currently awaiting final results and disposition.  [MD]   Joanne May 21, 2023   0610 Patient's urinalysis is negative for evidence of infection.  We discussed continuing oral hydration and Tylenol as needed for any urinary discomfort.  Given patient's renal function he is not able to take Pyridium.  We will treat his Candida rash with nystatin. [JS]      ED Course User Index  [JS] Dhaval Gallegos MD  [MD] Cayden Bass PA-C       Labs:    Lab Results (last 24 hours)     Procedure Component Value Units Date/Time    CBC & Differential [[id number]]  (Abnormal) Collected: 05/20/23 2242    Specimen: Blood Updated: 05/20/23 2251    Narrative:      The following orders were created for panel order CBC & Differential.  Procedure                               Abnormality         Status                     ---------                               -----------         ------                     CBC Auto Differential[[id number]]        Abnormal            Final result                 Please view results for these tests on the individual orders.    Comprehensive Metabolic Panel [[id number]]  (Abnormal) Collected: 05/20/23 2242    Specimen: Blood Updated: 05/20/23 2322     Glucose 124 mg/dL      BUN 31 mg/dL      Creatinine 2.89 mg/dL      Sodium 141 mmol/L       Potassium 4.6 mmol/L      Chloride 104 mmol/L      CO2 27.0 mmol/L      Calcium 9.2 mg/dL      Total Protein 6.9 g/dL      Albumin 3.3 g/dL      ALT (SGPT) 14 U/L      AST (SGOT) 18 U/L      Alkaline Phosphatase 79 U/L      Total Bilirubin 0.2 mg/dL      Globulin 3.6 gm/dL      A/G Ratio 0.9 g/dL      BUN/Creatinine Ratio 10.7     Anion Gap 10.0 mmol/L      eGFR 21.2 mL/min/1.73     Narrative:      GFR Normal >60  Chronic Kidney Disease <60  Kidney Failure <15    The GFR formula is only valid for adults with stable renal function between ages 18 and 70.    Lipase [[id number]]  (Abnormal) Collected: 05/20/23 2242    Specimen: Blood Updated: 05/20/23 2322     Lipase 119 U/L     Lactic Acid, Plasma [[id number]]  (Normal) Collected: 05/20/23 2242    Specimen: Blood Updated: 05/20/23 2319     Lactate 1.4 mmol/L     CBC Auto Differential [[id number]]  (Abnormal) Collected: 05/20/23 2242    Specimen: Blood Updated: 05/20/23 2251     WBC 9.20 10*3/mm3      RBC 3.66 10*6/mm3      Hemoglobin 11.4 g/dL      Hematocrit 34.2 %      MCV 93.4 fL      MCH 31.1 pg      MCHC 33.3 g/dL      RDW 12.4 %      RDW-SD 42.7 fl      MPV 8.5 fL      Platelets 374 10*3/mm3      Neutrophil % 73.0 %      Lymphocyte % 13.6 %      Monocyte % 8.2 %      Eosinophil % 4.0 %      Basophil % 0.8 %      Immature Grans % 0.4 %      Neutrophils, Absolute 6.72 10*3/mm3      Lymphocytes, Absolute 1.25 10*3/mm3      Monocytes, Absolute 0.75 10*3/mm3      Eosinophils, Absolute 0.37 10*3/mm3      Basophils, Absolute 0.07 10*3/mm3      Immature Grans, Absolute 0.04 10*3/mm3      nRBC 0.0 /100 WBC     Urinalysis With Microscopic If Indicated (No Culture) - Urine, Clean Catch [[id number]]  (Abnormal) Collected: 05/21/23 0525    Specimen: Urine, Clean Catch Updated: 05/21/23 0534     Color, UA Yellow     Appearance, UA Clear     pH, UA 7.0     Specific Gravity, UA 1.015     Glucose, UA Negative     Ketones, UA Negative     Bilirubin, UA Negative     Blood, UA  Negative     Protein, UA >=300 mg/dL (3+)     Leuk Esterase, UA Negative     Nitrite, UA Negative     Urobilinogen, UA 0.2 E.U./dL    Urinalysis, Microscopic Only - Urine, Clean Catch [[id number]]  (Abnormal) Collected: 05/21/23 0525    Specimen: Urine, Clean Catch Updated: 05/21/23 0534     RBC, UA 0-2 /HPF      WBC, UA 0-2 /HPF      Bacteria, UA None Seen /HPF      Squamous Epithelial Cells, UA 0-2 /HPF      Hyaline Casts, UA 3-6 /LPF      Methodology Automated Microscopy           Imaging:    CT Abdomen Pelvis Without Contrast    Result Date: 5/21/2023  PROCEDURE: CT ABDOMEN PELVIS WO CONTRAST  COMPARISON: 2/17/2023.  INDICATIONS: lower abd. pain  TECHNIQUE: 629 CT images were created without intravenous or oral contrast agent administration.   PROTOCOL:   Standard CT imaging protocol performed.    RADIATION:   Total DLP: 323.3 mGy*cm   Automated exposure control was utilized to minimize radiation dose.  FINDINGS: New age-indeterminate reticulonodular pulmonary opacities are seen in the lower lobe of the left lung.  An age-indeterminate infectious/inflammatory pulmonary process cannot be excluded.  Please correlate clinically.  Consider imaging follow-up.  No definite focal infiltrate is seen in the right lung base.  There is diffuse colonic diverticulosis without acute diverticulitis.  No acute appendicitis.  No acute colitis.  No mechanical bowel obstruction.  No pneumoperitoneum or pneumatosis.  No nephrolithiasis or ureterolithiasis.  Renal arterial calcifications are seen.  No hydronephrosis.  There is a tiny nonspecific focus of intraluminal gas within the urinary bladder, as on image 188 of series 201 and adjacent images.  It may be related to recent catheterization.  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  No urinary bladder calculi are seen.  Atherosclerotic changes are present,  including involvement of the coronary arteries.  The maximum diameter of the ascending aorta is 4.2 cm.  Fusiform aneurysmal enlargement of the thoracoabdominal aorta is suspected.  Consider close interval clinical and imaging follow-up to ensure a benign progression and to exclude an expanding or developing fusiform aneurysm.  The maximum diameter of the distal descending aorta is about 3.3 cm, which is mildly dilated.  The maximum diameter of the abdominal aorta and, at or just below the level of the diaphragmatic hiatus, is 3.4 cm.  The maximum diameter of the infrarenal abdominal aorta is 2.9 cm.  No definite aneurysmal dilatation of the common iliac arteries.  No acute intraperitoneal or retroperitoneal hemorrhage.  There may be mild diffuse prostatomegaly.  There are bilateral inguinal hernias, which contain fat and vessels and no bowel.  Moderate-to-severe degenerative changes are seen throughout the imaged spine.  Please note that this exam was not tailored in order to fully evaluate the degenerative changes of the spine.  There are suspected pneumatocysts associated with the L4-5 level, especially the left-sided facet joints, such as seen on image 133 of series 201, image 98 of series 203, and adjacent images.  There may be diffuse idiopathic skeletal hyperostosis (DISH).  Degenerative changes involve the hip joints and the bilateral sacroiliac joints.  No acute fracture.  No aggressive osseous lesion is suggested.  Renal cysts are suspected.  They are not fully characterized by this nonenhanced CT study.  There may be small hepatic cysts, such as in the left lobe of the liver, as on image 45 of series 201, measuring about 1 cm in size, seen previously.         1. There may be an age-indeterminate infectious/inflammatory process involving the lower lobe of the left lung with new reticulonodular opacities present (new since 2/17/2023).  Please correlate clinically.  Consider imaging follow-up to ensure a  benign progression.   2. Otherwise, no acute findings are appreciated by nonenhanced CT examination.   3. 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  Consider close interval clinical and imaging follow-up of this finding.  Also, extensive atherosclerotic changes are present, including involvement of the coronary arteries.  No acute intraperitoneal or retroperitoneal hemorrhage.   4. A small focus of gas is seen in the urinary bladder lumen and may represent recent catheterization.   5. There is diffuse colonic diverticulosis without acute diverticulitis.  6. Please see above comments for further detail.    Please note that portions of this note were completed with a voice recognition program.  PADMINI IRBY JR, MD       Electronically Signed and Approved By: PADMINI IRBY JR, MD on 5/21/2023 at 3:36                  Differential Diagnosis and Discussion:      Abdominal Pain: Based on the patient's signs and symptoms, I considered abdominal aortic aneurysm, small bowel obstruction, pancreatitis, acute cholecystitis, acute appendecitis, peptic ulcer disease, gastritis, colitis, endocrine disorders, irritable bowel syndrome and other differential diagnosis an etiology of the patient's abdominal pain.    All labs were reviewed and interpreted by me.  CT scan radiology impression was interpreted by me.    [facility]         Patient Care Considerations:    NARCOTICS: I considered prescribing opiate pain medication as an outpatient, however Patient's pain is controlled without narcotics in the emergency department      Consultants/Shared Management Plan:    None    Social Determinants of Health:    Patient is independent, reliable, and has access to care.       Disposition and Care Coordination:    Discharged: The patient is suitable and stable for discharge with no need for consideration of observation or admission.    I have explained the patient´s condition, diagnoses and treatment plan  based on the information available to me at this time. I have answered questions and addressed any concerns. The patient has a good  understanding of the patient´s diagnosis, condition, and treatment plan as can be expected at this point. The vital signs have been stable. The patient´s condition is stable and appropriate for discharge from the emergency department.      The patient will pursue further outpatient evaluation with the primary care physician or other designated or consulting physician as outlined in the discharge instructions. They are agreeable to this plan of care and follow-up instructions have been explained in detail. The patient has received these instructions in written format and have expressed an understanding of the discharge instructions. The patient is aware that any significant change in condition or worsening of symptoms should prompt an immediate return to this or the closest emergency department or call to 911.  I have explained discharge medications and the need for follow up with the patient/caretakers. This was also printed in the discharge instructions. Patient was discharged with the following medications and follow up:      Medication List      New Prescriptions    nystatin-triamcinolone 604888-2.1 UNIT/GM-% ointment  Commonly known as: MYCOLOG  Apply 1 application topically to the appropriate area as directed 2 (Two) Times a Day.           Where to Get Your Medications      These medications were sent to [facility] [id number] - [place], [place] - [address] [place] [address] - [phone] [facility] [phone] [address] [address]    Phone: [phone]   · nystatin-triamcinolone 879185-8.1 UNIT/GM-% ointment      Allison Villafana MD  [address] [facility] [address] [phone]    Schedule an appointment as soon as possible for a visit          Final diagnoses:   Dysuria   Cutaneous candidiasis        ED Disposition     ED Disposition   Discharge    Condition    Stable    Comment   --             This medical record created using voice recognition software.           Dhaval Gallegos MD  05/21/23 6497

## 2023-05-26 ENCOUNTER — TELEPHONE (OUTPATIENT)
Dept: UROLOGY | Facility: CLINIC | Age: 83
End: 2023-05-26
Payer: MEDICARE

## 2023-05-26 NOTE — TELEPHONE ENCOUNTER
[facility] is needing an [facility] for patient for follow up for urinary retention referral in the chart.

## 2023-09-14 ENCOUNTER — APPOINTMENT (OUTPATIENT)
Dept: GENERAL RADIOLOGY | Facility: HOSPITAL | Age: 83
End: 2023-09-14
Payer: MEDICARE

## 2023-09-14 ENCOUNTER — HOSPITAL ENCOUNTER (EMERGENCY)
Facility: HOSPITAL | Age: 83
Discharge: HOME | End: 2023-09-14
Attending: EMERGENCY MEDICINE
Payer: MEDICARE

## 2023-09-14 VITALS
HEART RATE: 69 BPM | RESPIRATION RATE: 16 BRPM | OXYGEN SATURATION: 100 % | SYSTOLIC BLOOD PRESSURE: 144 MMHG | WEIGHT: 123.68 LBS | HEIGHT: 66 IN | BODY MASS INDEX: 19.88 KG/M2 | DIASTOLIC BLOOD PRESSURE: 56 MMHG | TEMPERATURE: 98.7 F

## 2023-09-14 DIAGNOSIS — M79.604 RIGHT LEG PAIN: ICD-10-CM

## 2023-09-14 DIAGNOSIS — M25.571 ACUTE RIGHT ANKLE PAIN: ICD-10-CM

## 2023-09-14 DIAGNOSIS — M54.50 RIGHT-SIDED LOW BACK PAIN WITHOUT SCIATICA, UNSPECIFIED CHRONICITY: ICD-10-CM

## 2023-09-14 DIAGNOSIS — S93.401A SPRAIN OF RIGHT ANKLE, UNSPECIFIED LIGAMENT, INITIAL ENCOUNTER: ICD-10-CM

## 2023-09-14 DIAGNOSIS — N17.9 ACUTE KIDNEY INJURY SUPERIMPOSED ON CHRONIC KIDNEY DISEASE: Primary | ICD-10-CM

## 2023-09-14 DIAGNOSIS — N18.9 ACUTE KIDNEY INJURY SUPERIMPOSED ON CHRONIC KIDNEY DISEASE: Primary | ICD-10-CM

## 2023-09-14 PROCEDURE — 73610 X-RAY EXAM OF ANKLE: CPT

## 2023-09-14 PROCEDURE — 72170 X-RAY EXAM OF PELVIS: CPT

## 2023-09-14 PROCEDURE — 73590 X-RAY EXAM OF LOWER LEG: CPT

## 2023-09-14 PROCEDURE — 99283 EMERGENCY DEPT VISIT LOW MDM: CPT

## 2023-09-14 PROCEDURE — 73552 X-RAY EXAM OF FEMUR 2/>: CPT

## 2023-09-14 RX ORDER — TRAMADOL HYDROCHLORIDE 50 MG/1
50 TABLET ORAL EVERY 8 HOURS PRN
Qty: 12 TABLET | Refills: 0 | Status: SHIPPED | OUTPATIENT
Start: 2023-09-14

## 2023-09-14 RX ORDER — TRAMADOL HYDROCHLORIDE 50 MG/1
50 TABLET ORAL ONCE
Status: COMPLETED | OUTPATIENT
Start: 2023-09-14 | End: 2023-09-14

## 2023-09-14 RX ORDER — HYDROCODONE BITARTRATE AND ACETAMINOPHEN 5; 325 MG/1; MG/1
1 TABLET ORAL EVERY 6 HOURS PRN
Status: DISCONTINUED | OUTPATIENT
Start: 2023-09-14 | End: 2023-09-14 | Stop reason: HOSPADM

## 2023-09-14 RX ADMIN — TRAMADOL HYDROCHLORIDE 50 MG: 50 TABLET, COATED ORAL at 18:39

## 2023-09-14 RX ADMIN — HYDROCODONE BITARTRATE AND ACETAMINOPHEN 1 TABLET: 5; 325 TABLET ORAL at 20:44

## 2023-09-14 NOTE — ED PROVIDER NOTES
Time: 4:18 PM EDT  Date of encounter:  9/14/2023  Independent Historian/Clinical History and Information was obtained by:   Patient and Family    History is limited by: N/A    Chief Complaint   Patient presents with    Fall    Leg Pain         History of Present Illness:  Patient is a 83 y.o. year old male who presents to the emergency department for evaluation of fall.  Patient states that he was packing tree limbs knee fell backwards on Friday of last week, 6 days ago.  Patient has been unable to bear weight on the right leg since that time. (Provider in triage, Cayden Bass PA-C)    [facility] [place]    Patient Care Team  Primary Care Provider: Allison Villafana MD    Past Medical History:     Allergies   Allergen Reactions    Aspirin Other (See Comments)      thins blood     Past Medical History:   Diagnosis Date    Hyperlipidemia     Hypertension     Renal disorder      Past Surgical History:   Procedure Laterality Date    TONSILLECTOMY       History reviewed. No pertinent family history.    Home Medications:  Prior to Admission medications    Medication Sig Start Date End Date Taking? Authorizing Provider   amLODIPine (NORVASC) 10 MG tablet Take 10 mg by mouth Daily.    Cholo Jaime MD   HYDROcodone-acetaminophen (NORCO) 5-325 MG per tablet Take 1 tablet by mouth Every 6 (Six) Hours As Needed. 2/14/23   Cholo Jaime MD   lisinopril-hydrochlorothiazide (PRINZIDE,ZESTORETIC) 20-12.5 MG per tablet Take 1 tablet by mouth Daily.    Cholo Jaime MD   Magnesium 250 MG tablet Take 250 mg by mouth Every Other Day.    Cholo Jaime MD   nystatin-triamcinolone (MYCOLOG) 724264-6.1 UNIT/GM-% ointment Apply 1 application topically to the appropriate area as directed 2 (Two) Times a Day. 5/21/23   Dhaval Gallegos MD   omeprazole (priLOSEC) 40 MG capsule Take 40 mg by mouth Daily. 7/2/21   Cholo Jaime MD   pravastatin (PRAVACHOL) 40 MG tablet Take 40 mg by mouth Daily.    Cholo Jaime  "MD   tamsulosin (FLOMAX) 0.4 MG capsule 24 hr capsule Take 1 capsule by mouth Daily. 23   Alex Cohen,         Social History:   Social History     Tobacco Use    Smoking status: Former     Years: 3.00     Types: Cigarettes     Quit date: 2019     Years since quittin.1    Smokeless tobacco: Never   Vaping Use    Vaping Use: Never used   Substance Use Topics    Alcohol use: Not Currently    Drug use: Never         Review of Systems:  Review of Systems   Constitutional:  Negative for chills and fever.   HENT:  Negative for congestion, ear pain and sore throat.    Eyes:  Negative for pain.   Respiratory:  Negative for cough, chest tightness and shortness of breath.    Cardiovascular:  Negative for chest pain.   Gastrointestinal:  Negative for abdominal pain, diarrhea, nausea and vomiting.   Genitourinary:  Negative for flank pain and hematuria.   Musculoskeletal:  Positive for arthralgias (right ankle and upper leg pain) and back pain. Negative for joint swelling.   Skin:  Negative for pallor.   Neurological:  Negative for seizures and headaches.   All other systems reviewed and are negative.     Physical Exam:  /56 (BP Location: Left arm)   Pulse 69   Temp 98.7 °F (37.1 °C) (Oral)   Resp 16   Ht 167.6 cm (66\")   Wt 56.1 kg (123 lb 10.9 oz)   SpO2 100%   BMI 19.96 kg/m²         Physical Exam  Vitals and nursing note reviewed.   Constitutional:       General: He is not in acute distress.     Appearance: Normal appearance. He is not ill-appearing, toxic-appearing or diaphoretic.   HENT:      Head: Normocephalic and atraumatic.      Mouth/Throat:      Mouth: Mucous membranes are moist.   Eyes:      General: No scleral icterus.     Extraocular Movements: Extraocular movements intact.      Conjunctiva/sclera: Conjunctivae normal.   Cardiovascular:      Rate and Rhythm: Normal rate and regular rhythm.      Pulses: Normal pulses.      Heart sounds: Normal heart sounds.   Pulmonary:      " Effort: Pulmonary effort is normal. No respiratory distress.      Breath sounds: Normal breath sounds.   Abdominal:      General: Abdomen is flat. There is no distension.      Palpations: Abdomen is soft.      Tenderness: There is no abdominal tenderness.   Musculoskeletal:         General: Tenderness (greatest tenderness is at right ankle ROM intact, cap refill WNL, also tenderness to upper right leg. Pelvis stable, no internal or external rotation, and lower extremities are of equal length.) present. No swelling, deformity or signs of injury (no obvious injury). Normal range of motion.      Cervical back: Normal range of motion and neck supple.   Skin:     General: Skin is warm and dry.      Capillary Refill: Capillary refill takes less than 2 seconds.      Coloration: Skin is not cyanotic, jaundiced or pale.      Findings: No bruising, erythema, lesion or rash.   Neurological:      Mental Status: He is alert and oriented to person, place, and time. Mental status is at baseline.      Sensory: No sensory deficit.      Motor: Weakness present.   Psychiatric:         Attention and Perception: Attention and perception normal.         Mood and Affect: Mood normal.                    Procedures:  Procedures      Medical Decision Making:      Comorbidities that affect care:    Renal disease, Hypertension    External Notes reviewed:          The following orders were placed and all results were independently analyzed by me:  Orders Placed This Encounter   Procedures    XR Tibia Fibula 2 View Right    XR Femur 2 View Right    XR Pelvis 1 or 2 View    XR Ankle 3+ View Right       Medications Given in the Emergency Department:  Medications   traMADol (ULTRAM) tablet 50 mg (50 mg Oral Given 9/14/23 1839)        ED Course:    The patient was initially evaluated in the triage area where orders were placed. The patient was later dispositioned by RENE Branch.      The patient was advised to stay for completion of  workup which includes but is not limited to communication of labs and radiological results, reassessment and plan. The patient was advised that leaving prior to disposition by a provider could result in critical findings that are not communicated to the patient.     ED Course as of 09/16/23 0030   Thu Sep 14, 2023   1620 PROVIDER IN TRIAGE  Patient was evaluated by me in triage, Cayden Bass PA-C.  Orders were placed and patient is currently awaiting final results and disposition.  [MD]      ED Course User Index  [MD] Cayden Bass PA-C       Labs:    Lab Results (last 24 hours)       ** No results found for the last 24 hours. **             Imaging:    No Radiology Exams Resulted Within Past 24 Hours      Differential Diagnosis and Discussion:      Extremity Pain: Differential diagnosis includes but is not limited to soft tissue sprain, tendonitis, tendon injury, dislocation, fracture, deep vein thrombosis, arterial insufficiency, osteoarthritis, bursitis, and ligamentous damage.    All X-rays impressions were independently interpreted by me.    MDM  Number of Diagnoses or Management Options  Acute kidney injury superimposed on chronic kidney disease: new and does not require workup  Acute right ankle pain: new and does not require workup  Right leg pain: new and does not require workup  Right-sided low back pain without sciatica, unspecified chronicity: new and does not require workup  Sprain of right ankle, unspecified ligament, initial encounter: new and does not require workup     Amount and/or Complexity of Data Reviewed  Tests in the radiology section of CPT®: reviewed and ordered  Review and summarize past medical records: yes (I have personally reviewed patient's previous medical encounters.  )    Risk of Complications, Morbidity, and/or Mortality  Presenting problems: moderate  Diagnostic procedures: moderate  Management options: moderate    Patient Progress  Patient progress: stable            Patient Care Considerations:    CT HEAD: I considered ordering a noncontrast CT of the head, however pt did not strike head at time of fall, he was AAOx3, and had no LOC during incident.       Consultants/Shared Management Plan:    None    Social Determinants of Health:    Patient is independent, reliable, and has access to care.       Disposition and Care Coordination:    Discharged: The patient is suitable and stable for discharge with no need for consideration of observation or admission.    I have explained the patient´s condition, diagnoses and treatment plan based on the information available to me at this time. I have answered questions and addressed any concerns. The patient has a good  understanding of the patient´s diagnosis, condition, and treatment plan as can be expected at this point. The vital signs have been stable. The patient´s condition is stable and appropriate for discharge from the emergency department.      The patient will pursue further outpatient evaluation with the primary care physician or other designated or consulting physician as outlined in the discharge instructions. They are agreeable to this plan of care and follow-up instructions have been explained in detail. The patient has received these instructions in written format and have expressed an understanding of the discharge instructions. The patient is aware that any significant change in condition or worsening of symptoms should prompt an immediate return to this or the closest emergency department or call to 911.    Final diagnoses:   Acute kidney injury superimposed on chronic kidney disease   Acute right ankle pain   Sprain of right ankle, unspecified ligament, initial encounter   Right-sided low back pain without sciatica, unspecified chronicity   Right leg pain        ED Disposition       ED Disposition   Discharge    Condition   Stable    Comment   --               This medical record created using voice recognition  software.             Valentine Rico, APRN  09/16/23 0030

## 2023-09-15 NOTE — DISCHARGE INSTRUCTIONS
Please follow-up with your primary care provider in 3 to 5 days for a reevaluation.  If your pain does not improve with the medication prescribed you today please advise your physician.  It may be that you need additional imaging such as MRIs.  However your x-rays completed tonight were all within normal limits.  If you feel that your pain is getting worse, your joints become red in nature warm to the touch, or if you have any other concerns from today's visit please return to the ER otherwise follow-up with your PCP.

## 2024-07-19 ENCOUNTER — APPOINTMENT (OUTPATIENT)
Dept: GENERAL RADIOLOGY | Facility: HOSPITAL | Age: 84
End: 2024-07-19
Payer: MEDICARE

## 2024-07-19 LAB
ALBUMIN SERPL-MCNC: 3.7 G/DL (ref 3.5–5.2)
ALBUMIN/GLOB SERPL: 1.5 G/DL
ALP SERPL-CCNC: 92 U/L (ref 39–117)
ALT SERPL W P-5'-P-CCNC: 10 U/L (ref 1–41)
ANION GAP SERPL CALCULATED.3IONS-SCNC: 10.9 MMOL/L (ref 5–15)
AST SERPL-CCNC: 16 U/L (ref 1–40)
BASOPHILS # BLD AUTO: 0.07 10*3/MM3 (ref 0–0.2)
BASOPHILS NFR BLD AUTO: 0.5 % (ref 0–1.5)
BILIRUB SERPL-MCNC: 0.3 MG/DL (ref 0–1.2)
BUN SERPL-MCNC: 41 MG/DL (ref 8–23)
BUN/CREAT SERPL: 10.4 (ref 7–25)
CALCIUM SPEC-SCNC: 8.7 MG/DL (ref 8.6–10.5)
CHLORIDE SERPL-SCNC: 103 MMOL/L (ref 98–107)
CO2 SERPL-SCNC: 22.1 MMOL/L (ref 22–29)
CREAT SERPL-MCNC: 3.95 MG/DL (ref 0.76–1.27)
DEPRECATED RDW RBC AUTO: 46.5 FL (ref 37–54)
EGFRCR SERPLBLD CKD-EPI 2021: 14.5 ML/MIN/1.73
EOSINOPHIL # BLD AUTO: 0.01 10*3/MM3 (ref 0–0.4)
EOSINOPHIL NFR BLD AUTO: 0.1 % (ref 0.3–6.2)
ERYTHROCYTE [DISTWIDTH] IN BLOOD BY AUTOMATED COUNT: 13.5 % (ref 12.3–15.4)
FLUAV SUBTYP SPEC NAA+PROBE: NOT DETECTED
FLUBV RNA ISLT QL NAA+PROBE: NOT DETECTED
GLOBULIN UR ELPH-MCNC: 2.4 GM/DL
GLUCOSE SERPL-MCNC: 123 MG/DL (ref 65–99)
HCT VFR BLD AUTO: 29.2 % (ref 37.5–51)
HGB BLD-MCNC: 9.7 G/DL (ref 13–17.7)
HOLD SPECIMEN: NORMAL
HOLD SPECIMEN: NORMAL
IMM GRANULOCYTES # BLD AUTO: 0.08 10*3/MM3 (ref 0–0.05)
IMM GRANULOCYTES NFR BLD AUTO: 0.5 % (ref 0–0.5)
LIPASE SERPL-CCNC: 55 U/L (ref 13–60)
LYMPHOCYTES # BLD AUTO: 0.5 10*3/MM3 (ref 0.7–3.1)
LYMPHOCYTES NFR BLD AUTO: 3.3 % (ref 19.6–45.3)
MAGNESIUM SERPL-MCNC: 1.8 MG/DL (ref 1.6–2.4)
MCH RBC QN AUTO: 31.5 PG (ref 26.6–33)
MCHC RBC AUTO-ENTMCNC: 33.2 G/DL (ref 31.5–35.7)
MCV RBC AUTO: 94.8 FL (ref 79–97)
MONOCYTES # BLD AUTO: 1.08 10*3/MM3 (ref 0.1–0.9)
MONOCYTES NFR BLD AUTO: 7.2 % (ref 5–12)
NEUTROPHILS NFR BLD AUTO: 13.3 10*3/MM3 (ref 1.7–7)
NEUTROPHILS NFR BLD AUTO: 88.4 % (ref 42.7–76)
NRBC BLD AUTO-RTO: 0 /100 WBC (ref 0–0.2)
NT-PROBNP SERPL-MCNC: ABNORMAL PG/ML (ref 0–1800)
PLATELET # BLD AUTO: 278 10*3/MM3 (ref 140–450)
PMV BLD AUTO: 9.3 FL (ref 6–12)
POTASSIUM SERPL-SCNC: 4.8 MMOL/L (ref 3.5–5.2)
PROT SERPL-MCNC: 6.1 G/DL (ref 6–8.5)
RBC # BLD AUTO: 3.08 10*6/MM3 (ref 4.14–5.8)
RSV RNA NPH QL NAA+NON-PROBE: NOT DETECTED
SARS-COV-2 RNA RESP QL NAA+PROBE: NOT DETECTED
SODIUM SERPL-SCNC: 136 MMOL/L (ref 136–145)
TROPONIN T SERPL HS-MCNC: 116 NG/L
WBC NRBC COR # BLD AUTO: 15.04 10*3/MM3 (ref 3.4–10.8)
WHOLE BLOOD HOLD COAG: NORMAL
WHOLE BLOOD HOLD SPECIMEN: NORMAL

## 2024-07-19 PROCEDURE — 84484 ASSAY OF TROPONIN QUANT: CPT

## 2024-07-19 PROCEDURE — 83735 ASSAY OF MAGNESIUM: CPT

## 2024-07-19 PROCEDURE — 85025 COMPLETE CBC W/AUTO DIFF WBC: CPT | Performed by: EMERGENCY MEDICINE

## 2024-07-19 PROCEDURE — 99291 CRITICAL CARE FIRST HOUR: CPT

## 2024-07-19 PROCEDURE — 93010 ELECTROCARDIOGRAM REPORT: CPT | Performed by: INTERNAL MEDICINE

## 2024-07-19 PROCEDURE — 71045 X-RAY EXAM CHEST 1 VIEW: CPT

## 2024-07-19 PROCEDURE — 93005 ELECTROCARDIOGRAM TRACING: CPT | Performed by: EMERGENCY MEDICINE

## 2024-07-19 PROCEDURE — 83690 ASSAY OF LIPASE: CPT

## 2024-07-19 PROCEDURE — 80053 COMPREHEN METABOLIC PANEL: CPT

## 2024-07-19 PROCEDURE — 36415 COLL VENOUS BLD VENIPUNCTURE: CPT | Performed by: EMERGENCY MEDICINE

## 2024-07-19 PROCEDURE — 83880 ASSAY OF NATRIURETIC PEPTIDE: CPT

## 2024-07-19 PROCEDURE — 87637 SARSCOV2&INF A&B&RSV AMP PRB: CPT

## 2024-07-19 RX ORDER — SODIUM CHLORIDE 0.9 % (FLUSH) 0.9 %
10 SYRINGE (ML) INJECTION AS NEEDED
Status: DISCONTINUED | OUTPATIENT
Start: 2024-07-19 | End: 2024-07-22 | Stop reason: HOSPADM

## 2024-07-20 ENCOUNTER — HOSPITAL ENCOUNTER (INPATIENT)
Facility: HOSPITAL | Age: 84
LOS: 2 days | Discharge: HOME | End: 2024-07-22
Attending: EMERGENCY MEDICINE | Admitting: STUDENT IN AN ORGANIZED HEALTH CARE EDUCATION/TRAINING PROGRAM
Payer: MEDICARE

## 2024-07-20 ENCOUNTER — APPOINTMENT (OUTPATIENT)
Dept: CT IMAGING | Facility: HOSPITAL | Age: 84
End: 2024-07-20
Payer: MEDICARE

## 2024-07-20 ENCOUNTER — APPOINTMENT (OUTPATIENT)
Dept: CARDIOLOGY | Facility: HOSPITAL | Age: 84
End: 2024-07-20
Payer: MEDICARE

## 2024-07-20 DIAGNOSIS — J90 PLEURAL EFFUSION ON LEFT: ICD-10-CM

## 2024-07-20 DIAGNOSIS — D72.829 LEUKOCYTOSIS, UNSPECIFIED TYPE: ICD-10-CM

## 2024-07-20 DIAGNOSIS — Z78.9 DECREASED ACTIVITIES OF DAILY LIVING (ADL): ICD-10-CM

## 2024-07-20 DIAGNOSIS — R79.89 ELEVATED TROPONIN: ICD-10-CM

## 2024-07-20 DIAGNOSIS — A41.9 SEPSIS, DUE TO UNSPECIFIED ORGANISM, UNSPECIFIED WHETHER ACUTE ORGAN DYSFUNCTION PRESENT: ICD-10-CM

## 2024-07-20 DIAGNOSIS — J18.9 PNEUMONIA OF LEFT LOWER LOBE DUE TO INFECTIOUS ORGANISM: Primary | ICD-10-CM

## 2024-07-20 DIAGNOSIS — R26.2 DIFFICULTY IN WALKING: ICD-10-CM

## 2024-07-20 LAB
BH CV ECHO MEAS - AI P1/2T: 388.8 MSEC
BH CV ECHO MEAS - AO MAX PG: 14.7 MMHG
BH CV ECHO MEAS - AO MEAN PG: 8.7 MMHG
BH CV ECHO MEAS - AO ROOT DIAM: 3.6 CM
BH CV ECHO MEAS - AO V2 MAX: 192 CM/SEC
BH CV ECHO MEAS - AO V2 VTI: 42.4 CM
BH CV ECHO MEAS - AVA(I,D): 1.7 CM2
BH CV ECHO MEAS - EDV(CUBED): 97.8 ML
BH CV ECHO MEAS - EDV(MOD-SP2): 77.9 ML
BH CV ECHO MEAS - EDV(MOD-SP4): 70.5 ML
BH CV ECHO MEAS - EF(MOD-BP): 50.8 %
BH CV ECHO MEAS - EF(MOD-SP2): 51.3 %
BH CV ECHO MEAS - EF(MOD-SP4): 50.4 %
BH CV ECHO MEAS - ESV(CUBED): 38.4 ML
BH CV ECHO MEAS - ESV(MOD-SP2): 37.9 ML
BH CV ECHO MEAS - ESV(MOD-SP4): 35 ML
BH CV ECHO MEAS - FS: 26.8 %
BH CV ECHO MEAS - IVS/LVPW: 1.31 CM
BH CV ECHO MEAS - IVSD: 1.16 CM
BH CV ECHO MEAS - LA DIMENSION: 3.3 CM
BH CV ECHO MEAS - LAT PEAK E' VEL: 10.4 CM/SEC
BH CV ECHO MEAS - LV DIASTOLIC VOL/BSA (35-75): 37.4 CM2
BH CV ECHO MEAS - LV MASS(C)D: 164.9 GRAMS
BH CV ECHO MEAS - LV MAX PG: 5.4 MMHG
BH CV ECHO MEAS - LV MEAN PG: 2.4 MMHG
BH CV ECHO MEAS - LV SYSTOLIC VOL/BSA (12-30): 18.6 CM2
BH CV ECHO MEAS - LV V1 MAX: 116.5 CM/SEC
BH CV ECHO MEAS - LV V1 VTI: 23.1 CM
BH CV ECHO MEAS - LVIDD: 4.6 CM
BH CV ECHO MEAS - LVIDS: 3.4 CM
BH CV ECHO MEAS - LVOT AREA: 3.1 CM2
BH CV ECHO MEAS - LVOT DIAM: 1.99 CM
BH CV ECHO MEAS - LVPWD: 0.89 CM
BH CV ECHO MEAS - MED PEAK E' VEL: 4.6 CM/SEC
BH CV ECHO MEAS - MV A MAX VEL: 74.9 CM/SEC
BH CV ECHO MEAS - MV DEC SLOPE: 721.8 CM/SEC2
BH CV ECHO MEAS - MV DEC TIME: 0.17 SEC
BH CV ECHO MEAS - MV E MAX VEL: 126 CM/SEC
BH CV ECHO MEAS - MV E/A: 1.68
BH CV ECHO MEAS - MV MAX PG: 6.7 MMHG
BH CV ECHO MEAS - MV MEAN PG: 2.47 MMHG
BH CV ECHO MEAS - MV V2 VTI: 21.4 CM
BH CV ECHO MEAS - MVA(VTI): 3.4 CM2
BH CV ECHO MEAS - RVDD: 2.8 CM
BH CV ECHO MEAS - SV(LVOT): 71.9 ML
BH CV ECHO MEAS - SV(MOD-SP2): 40 ML
BH CV ECHO MEAS - SV(MOD-SP4): 35.5 ML
BH CV ECHO MEAS - SVI(LVOT): 38.2 ML/M2
BH CV ECHO MEAS - SVI(MOD-SP2): 21.2 ML/M2
BH CV ECHO MEAS - SVI(MOD-SP4): 18.8 ML/M2
BH CV ECHO MEAS - TAPSE (>1.6): 1.34 CM
BH CV ECHO MEASUREMENTS AVERAGE E/E' RATIO: 16.8
CHOLEST SERPL-MCNC: 148 MG/DL (ref 0–200)
D-LACTATE SERPL-SCNC: 1.2 MMOL/L (ref 0.5–2)
GEN 5 2HR TROPONIN T REFLEX: 150 NG/L
HBA1C MFR BLD: 5.1 % (ref 4.8–5.6)
HDLC SERPL-MCNC: 47 MG/DL (ref 40–60)
L PNEUMO1 AG UR QL IA: NEGATIVE
LDLC SERPL CALC-MCNC: 87 MG/DL (ref 0–100)
LDLC/HDLC SERPL: 1.86 {RATIO}
LEFT ATRIUM VOLUME INDEX: 19.3 ML/M2
PROCALCITONIN SERPL-MCNC: 0.25 NG/ML (ref 0–0.25)
QT INTERVAL: 390 MS
QT INTERVAL: 410 MS
QTC INTERVAL: 457 MS
QTC INTERVAL: 470 MS
S PNEUM AG SPEC QL LA: NEGATIVE
TRIGL SERPL-MCNC: 68 MG/DL (ref 0–150)
TROPONIN T DELTA: 34 NG/L
TROPONIN T SERPL HS-MCNC: 165 NG/L
TSH SERPL DL<=0.05 MIU/L-ACNC: 1.53 UIU/ML (ref 0.27–4.2)
VLDLC SERPL-MCNC: 14 MG/DL (ref 5–40)

## 2024-07-20 PROCEDURE — 84443 ASSAY THYROID STIM HORMONE: CPT | Performed by: STUDENT IN AN ORGANIZED HEALTH CARE EDUCATION/TRAINING PROGRAM

## 2024-07-20 PROCEDURE — 87449 NOS EACH ORGANISM AG IA: CPT | Performed by: INTERNAL MEDICINE

## 2024-07-20 PROCEDURE — 84484 ASSAY OF TROPONIN QUANT: CPT | Performed by: STUDENT IN AN ORGANIZED HEALTH CARE EDUCATION/TRAINING PROGRAM

## 2024-07-20 PROCEDURE — 25010000002 FUROSEMIDE PER 20 MG: Performed by: STUDENT IN AN ORGANIZED HEALTH CARE EDUCATION/TRAINING PROGRAM

## 2024-07-20 PROCEDURE — 83036 HEMOGLOBIN GLYCOSYLATED A1C: CPT | Performed by: STUDENT IN AN ORGANIZED HEALTH CARE EDUCATION/TRAINING PROGRAM

## 2024-07-20 PROCEDURE — 83605 ASSAY OF LACTIC ACID: CPT | Performed by: EMERGENCY MEDICINE

## 2024-07-20 PROCEDURE — 84484 ASSAY OF TROPONIN QUANT: CPT | Performed by: EMERGENCY MEDICINE

## 2024-07-20 PROCEDURE — 93005 ELECTROCARDIOGRAM TRACING: CPT | Performed by: EMERGENCY MEDICINE

## 2024-07-20 PROCEDURE — 87899 AGENT NOS ASSAY W/OPTIC: CPT | Performed by: INTERNAL MEDICINE

## 2024-07-20 PROCEDURE — 93306 TTE W/DOPPLER COMPLETE: CPT

## 2024-07-20 PROCEDURE — 93005 ELECTROCARDIOGRAM TRACING: CPT

## 2024-07-20 PROCEDURE — 86335 IMMUNFIX E-PHORSIS/URINE/CSF: CPT | Performed by: INTERNAL MEDICINE

## 2024-07-20 PROCEDURE — 25010000002 CEFTRIAXONE PER 250 MG: Performed by: EMERGENCY MEDICINE

## 2024-07-20 PROCEDURE — 25010000002 AZITHROMYCIN PER 500 MG: Performed by: STUDENT IN AN ORGANIZED HEALTH CARE EDUCATION/TRAINING PROGRAM

## 2024-07-20 PROCEDURE — 94799 UNLISTED PULMONARY SVC/PX: CPT

## 2024-07-20 PROCEDURE — 80061 LIPID PANEL: CPT | Performed by: STUDENT IN AN ORGANIZED HEALTH CARE EDUCATION/TRAINING PROGRAM

## 2024-07-20 PROCEDURE — 25810000003 SODIUM CHLORIDE 0.9 % SOLUTION: Performed by: STUDENT IN AN ORGANIZED HEALTH CARE EDUCATION/TRAINING PROGRAM

## 2024-07-20 PROCEDURE — 25010000002 CEFTRIAXONE PER 250 MG: Performed by: STUDENT IN AN ORGANIZED HEALTH CARE EDUCATION/TRAINING PROGRAM

## 2024-07-20 PROCEDURE — 71250 CT THORAX DX C-: CPT

## 2024-07-20 PROCEDURE — 93306 TTE W/DOPPLER COMPLETE: CPT | Performed by: INTERNAL MEDICINE

## 2024-07-20 PROCEDURE — 84145 PROCALCITONIN (PCT): CPT | Performed by: INTERNAL MEDICINE

## 2024-07-20 PROCEDURE — 99223 1ST HOSP IP/OBS HIGH 75: CPT | Performed by: STUDENT IN AN ORGANIZED HEALTH CARE EDUCATION/TRAINING PROGRAM

## 2024-07-20 PROCEDURE — 99222 1ST HOSP IP/OBS MODERATE 55: CPT | Performed by: INTERNAL MEDICINE

## 2024-07-20 PROCEDURE — 94760 N-INVAS EAR/PLS OXIMETRY 1: CPT

## 2024-07-20 PROCEDURE — 87040 BLOOD CULTURE FOR BACTERIA: CPT | Performed by: EMERGENCY MEDICINE

## 2024-07-20 RX ORDER — POLYETHYLENE GLYCOL 3350 17 G/17G
17 POWDER, FOR SOLUTION ORAL DAILY PRN
Status: DISCONTINUED | OUTPATIENT
Start: 2024-07-20 | End: 2024-07-22 | Stop reason: HOSPADM

## 2024-07-20 RX ORDER — SODIUM CHLORIDE 9 MG/ML
40 INJECTION, SOLUTION INTRAVENOUS AS NEEDED
Status: DISCONTINUED | OUTPATIENT
Start: 2024-07-20 | End: 2024-07-22 | Stop reason: HOSPADM

## 2024-07-20 RX ORDER — ALUMINA, MAGNESIA, AND SIMETHICONE 2400; 2400; 240 MG/30ML; MG/30ML; MG/30ML
15 SUSPENSION ORAL EVERY 6 HOURS PRN
Status: DISCONTINUED | OUTPATIENT
Start: 2024-07-20 | End: 2024-07-22 | Stop reason: HOSPADM

## 2024-07-20 RX ORDER — FAMOTIDINE 20 MG/1
20 TABLET, FILM COATED ORAL EVERY MORNING
COMMUNITY

## 2024-07-20 RX ORDER — SODIUM CHLORIDE 0.9 % (FLUSH) 0.9 %
10 SYRINGE (ML) INJECTION EVERY 12 HOURS SCHEDULED
Status: DISCONTINUED | OUTPATIENT
Start: 2024-07-20 | End: 2024-07-22 | Stop reason: HOSPADM

## 2024-07-20 RX ORDER — ONDANSETRON 2 MG/ML
4 INJECTION INTRAMUSCULAR; INTRAVENOUS EVERY 6 HOURS PRN
Status: DISCONTINUED | OUTPATIENT
Start: 2024-07-20 | End: 2024-07-22 | Stop reason: HOSPADM

## 2024-07-20 RX ORDER — BISACODYL 5 MG/1
5 TABLET, DELAYED RELEASE ORAL DAILY PRN
Status: DISCONTINUED | OUTPATIENT
Start: 2024-07-20 | End: 2024-07-22 | Stop reason: HOSPADM

## 2024-07-20 RX ORDER — HYDRALAZINE HYDROCHLORIDE 20 MG/ML
10 INJECTION INTRAMUSCULAR; INTRAVENOUS EVERY 6 HOURS PRN
Status: DISCONTINUED | OUTPATIENT
Start: 2024-07-20 | End: 2024-07-22 | Stop reason: HOSPADM

## 2024-07-20 RX ORDER — TAMSULOSIN HYDROCHLORIDE 0.4 MG/1
0.4 CAPSULE ORAL DAILY
Status: DISCONTINUED | OUTPATIENT
Start: 2024-07-20 | End: 2024-07-22 | Stop reason: HOSPADM

## 2024-07-20 RX ORDER — AMOXICILLIN 250 MG
2 CAPSULE ORAL 2 TIMES DAILY PRN
Status: DISCONTINUED | OUTPATIENT
Start: 2024-07-20 | End: 2024-07-22 | Stop reason: HOSPADM

## 2024-07-20 RX ORDER — ONDANSETRON 4 MG/1
4 TABLET, ORALLY DISINTEGRATING ORAL EVERY 6 HOURS PRN
Status: DISCONTINUED | OUTPATIENT
Start: 2024-07-20 | End: 2024-07-22 | Stop reason: HOSPADM

## 2024-07-20 RX ORDER — PRAVASTATIN SODIUM 20 MG
40 TABLET ORAL NIGHTLY
Status: DISCONTINUED | OUTPATIENT
Start: 2024-07-20 | End: 2024-07-22 | Stop reason: HOSPADM

## 2024-07-20 RX ORDER — HYDRALAZINE HYDROCHLORIDE 25 MG/1
25 TABLET, FILM COATED ORAL 3 TIMES DAILY
Status: DISCONTINUED | OUTPATIENT
Start: 2024-07-20 | End: 2024-07-22 | Stop reason: HOSPADM

## 2024-07-20 RX ORDER — ACETAMINOPHEN 325 MG/1
650 TABLET ORAL EVERY 6 HOURS PRN
Status: DISCONTINUED | OUTPATIENT
Start: 2024-07-20 | End: 2024-07-22 | Stop reason: HOSPADM

## 2024-07-20 RX ORDER — FUROSEMIDE 10 MG/ML
20 INJECTION INTRAMUSCULAR; INTRAVENOUS EVERY 12 HOURS
Status: DISCONTINUED | OUTPATIENT
Start: 2024-07-20 | End: 2024-07-21

## 2024-07-20 RX ORDER — CALCITRIOL 0.25 UG/1
0.25 CAPSULE, LIQUID FILLED ORAL DAILY
Status: DISCONTINUED | OUTPATIENT
Start: 2024-07-20 | End: 2024-07-22 | Stop reason: HOSPADM

## 2024-07-20 RX ORDER — TRAMADOL HYDROCHLORIDE 50 MG/1
25 TABLET ORAL EVERY 12 HOURS PRN
Status: DISCONTINUED | OUTPATIENT
Start: 2024-07-20 | End: 2024-07-22 | Stop reason: HOSPADM

## 2024-07-20 RX ORDER — AMLODIPINE BESYLATE 10 MG/1
10 TABLET ORAL DAILY
Status: DISCONTINUED | OUTPATIENT
Start: 2024-07-20 | End: 2024-07-22 | Stop reason: HOSPADM

## 2024-07-20 RX ORDER — AMLODIPINE BESYLATE 10 MG/1
10 TABLET ORAL DAILY
Status: DISCONTINUED | OUTPATIENT
Start: 2024-07-20 | End: 2024-07-20

## 2024-07-20 RX ORDER — IPRATROPIUM BROMIDE AND ALBUTEROL SULFATE 2.5; .5 MG/3ML; MG/3ML
3 SOLUTION RESPIRATORY (INHALATION) EVERY 6 HOURS PRN
Status: DISCONTINUED | OUTPATIENT
Start: 2024-07-20 | End: 2024-07-22 | Stop reason: HOSPADM

## 2024-07-20 RX ORDER — OXYCODONE HYDROCHLORIDE 5 MG/1
5 TABLET ORAL EVERY 8 HOURS PRN
Status: DISCONTINUED | OUTPATIENT
Start: 2024-07-20 | End: 2024-07-22 | Stop reason: HOSPADM

## 2024-07-20 RX ORDER — NITROGLYCERIN 0.4 MG/1
0.4 TABLET SUBLINGUAL
Status: DISCONTINUED | OUTPATIENT
Start: 2024-07-20 | End: 2024-07-22 | Stop reason: HOSPADM

## 2024-07-20 RX ORDER — HYDRALAZINE HYDROCHLORIDE 25 MG/1
25 TABLET, FILM COATED ORAL 3 TIMES DAILY
COMMUNITY

## 2024-07-20 RX ORDER — AZITHROMYCIN 250 MG/1
250 TABLET, FILM COATED ORAL
Qty: 4 TABLET | Refills: 0 | Status: DISCONTINUED | OUTPATIENT
Start: 2024-07-21 | End: 2024-07-22 | Stop reason: HOSPADM

## 2024-07-20 RX ORDER — SODIUM CHLORIDE 0.9 % (FLUSH) 0.9 %
10 SYRINGE (ML) INJECTION AS NEEDED
Status: DISCONTINUED | OUTPATIENT
Start: 2024-07-20 | End: 2024-07-22 | Stop reason: HOSPADM

## 2024-07-20 RX ORDER — PANTOPRAZOLE SODIUM 40 MG/1
40 TABLET, DELAYED RELEASE ORAL
Status: DISCONTINUED | OUTPATIENT
Start: 2024-07-20 | End: 2024-07-22 | Stop reason: HOSPADM

## 2024-07-20 RX ORDER — BISACODYL 10 MG
10 SUPPOSITORY, RECTAL RECTAL DAILY PRN
Status: DISCONTINUED | OUTPATIENT
Start: 2024-07-20 | End: 2024-07-22 | Stop reason: HOSPADM

## 2024-07-20 RX ADMIN — HYDRALAZINE HYDROCHLORIDE 25 MG: 25 TABLET ORAL at 20:12

## 2024-07-20 RX ADMIN — TAMSULOSIN HYDROCHLORIDE 0.4 MG: 0.4 CAPSULE ORAL at 10:05

## 2024-07-20 RX ADMIN — Medication 10 ML: at 05:26

## 2024-07-20 RX ADMIN — HYDRALAZINE HYDROCHLORIDE 25 MG: 25 TABLET ORAL at 05:26

## 2024-07-20 RX ADMIN — CEFTRIAXONE SODIUM 1000 MG: 1 INJECTION, POWDER, FOR SOLUTION INTRAMUSCULAR; INTRAVENOUS at 02:14

## 2024-07-20 RX ADMIN — AMLODIPINE BESYLATE 10 MG: 10 TABLET ORAL at 05:26

## 2024-07-20 RX ADMIN — HYDRALAZINE HYDROCHLORIDE 25 MG: 25 TABLET ORAL at 15:46

## 2024-07-20 RX ADMIN — PRAVASTATIN SODIUM 40 MG: 20 TABLET ORAL at 20:12

## 2024-07-20 RX ADMIN — CEFTRIAXONE SODIUM 2000 MG: 2 INJECTION, POWDER, FOR SOLUTION INTRAMUSCULAR; INTRAVENOUS at 10:05

## 2024-07-20 RX ADMIN — CALCITRIOL CAPSULES 0.25 MCG 0.25 MCG: 0.25 CAPSULE ORAL at 13:12

## 2024-07-20 RX ADMIN — DOXYCYCLINE 100 MG: 100 INJECTION, POWDER, LYOPHILIZED, FOR SOLUTION INTRAVENOUS at 03:03

## 2024-07-20 RX ADMIN — FUROSEMIDE 20 MG: 10 INJECTION, SOLUTION INTRAMUSCULAR; INTRAVENOUS at 03:50

## 2024-07-20 RX ADMIN — Medication 10 ML: at 10:06

## 2024-07-20 RX ADMIN — FUROSEMIDE 20 MG: 10 INJECTION, SOLUTION INTRAMUSCULAR; INTRAVENOUS at 15:46

## 2024-07-20 RX ADMIN — PANTOPRAZOLE SODIUM 40 MG: 40 TABLET, DELAYED RELEASE ORAL at 05:26

## 2024-07-20 RX ADMIN — Medication 10 ML: at 20:13

## 2024-07-20 RX ADMIN — AZITHROMYCIN DIHYDRATE 500 MG: 500 INJECTION, POWDER, LYOPHILIZED, FOR SOLUTION INTRAVENOUS at 05:26

## 2024-07-20 NOTE — CONSULTS
[facility]   Cardiology Consult Note    Patient Name: Huy Reddy  : 1940  MRN: [id number]  Primary Care Physician:  Allison Villafana MD  Referring Physician: No ref. provider found  Date of admission: 2024    Subjective   Subjective     Reason for Consult/ Chief Complaint: Fevers, elevated blood pressure    HPI:  Huy Reddy is a 83 y.o. male with history of hypertension, hyperlipidemia and chronic kidney disease with serum creatinine around 3.0.  He states around 2 PM yesterday he started having chills and was noted to have a fever of 105 degrees at home.  They also note his blood pressure was extremely elevated in the 190/90 range.  He did note a little bit of cough of yellowish sputum.  He did note a little bit of chest pain that is in the center of his chest.  It can occur just laying down.  It was the last 2 nights.  If he sat up or got up and walked around it got better.  He does note that he had eaten some type of beans the last 2 nights.    Review of Systems   All systems were reviewed and negative except for: Cardiac review of systems positive for atypical chest pain    Personal History     Past Medical History:   Diagnosis Date    Aortic aneurysm     Chronic renal failure     Hyperlipidemia     Hypertension     Monoclonal gammopathy     Peripheral vascular disease     Renal disorder     Skin cancer     TIA (transient ischemic attack)     Vitamin D deficiency         Past medical history reviewed      Family History: family history is not on file. Otherwise pertinent FHx was reviewed and not pertinent to current issue.    Social History:  reports that he quit smoking about 5 years ago. His smoking use included cigarettes. He started smoking about 8 years ago. He has never used smokeless tobacco. He reports that he does not currently use alcohol. He reports that he does not use drugs.    Home Medications:  HYDROcodone-acetaminophen, Magnesium, amLODIPine, famotidine, hydrALAZINE,  lisinopril-hydrochlorothiazide, nystatin-triamcinolone, pravastatin, tamsulosin, and traMADol    Allergies:  Allergies   Allergen Reactions    Aspirin Other (See Comments)      thins blood       Objective    Objective     Vitals:   Temp:  [98.1 °F (36.7 °C)-99.5 °F (37.5 °C)] 98.1 °F (36.7 °C)  Heart Rate:  [82-93] 86  Resp:  [18] 18  BP: (147-192)/(46-90) 154/52  Flow (L/min):  [2-5] 4      Physical Exam:   Constitutional: Awake, alert, No acute distress    Eyes: PERRLA, sclerae anicteric, no conjunctival injection   HENT: NCAT, mucous membranes moist   Neck: Supple, no thyromegaly, no lymphadenopathy, trachea midline   Respiratory: Clear to auscultation bilaterally, nonlabored respirations    Cardiovascular: RRR, no murmurs, rubs, or gallops, palpable pedal pulses bilaterally   Gastrointestinal: Positive bowel sounds, soft, nontender, nondistended   Musculoskeletal: No bilateral ankle edema, no clubbing or cyanosis to extremities   Psychiatric: Appropriate affect, cooperative   Neurologic: Oriented x 3, strength symmetric in all extremities, Cranial Nerves grossly intact to confrontation, speech clear   Skin: No rashes     Result Review    Result Review:  I have personally reviewed the results from the time of this admission to 7/20/2024 13:37 EDT and agree with these findings:  [x]  Laboratory  []  Microbiology  [x]  Radiology  [x]  EKG/Telemetry   [x]  Cardiology/Vascular   []  Pathology  [x]  Old records  []  Other:  Most notable findings include:     CMP          1/8/2024    13:19 1/8/2024    13:53 7/19/2024    22:36   CMP   Glucose  NEGATIVE     123    BUN   41    Creatinine   3.95    EGFR   14.5    Sodium   136    Potassium   4.8    Chloride   103    Calcium   8.7    Total Protein 6.3      6.1    Albumin 3.6      3.7    Globulin   2.4    Total Bilirubin   0.3    Alkaline Phosphatase   92    AST (SGOT)   16    ALT (SGPT)   10    Albumin/Globulin Ratio   1.5    BUN/Creatinine Ratio   10.4    Anion Gap   10.9        Details          This result is from an external source.              CBC          1/8/2024    13:53 7/8/2024    10:26 7/19/2024    22:36   CBC   WBC NONE SEEN     9.7     15.04    RBC  3.2     3.08    Hemoglobin  10.4     9.7    Hematocrit  30.4     29.2    MCV  96.3     94.8    MCH  32.8     31.5    MCHC  34.1     33.2    RDW  14.6     13.5    Platelets  309     278       Details          This result is from an external source.              Lab Results   Component Value Date    TROPONINT 165 (C) 07/20/2024         Assessment & Plan   Assessment / Plan     Brief Patient Summary:  Huy Reddy is a 83 y.o. male who presented with fevers, chills and cough of yellow sputum.  His temperature at home was 105 degrees.  He has chronic kidney disease with baseline serum creatinine approximately 3.0.    Active Hospital Problems:  Active Hospital Problems    Diagnosis     **PNA (pneumonia)        Assessment:  1.  Apparent pneumonia  2.  Fairly atypical chest pain  3.  Elevated troponins probably most consistent with a type II non-STEMI due to elevated blood pressure and chronic kidney disease.    Plan:   1.  Echocardiogram is pending.  2.  EKG shows normal sinus rhythm with nonspecific T wave changes.  There is no change from previous EKGs.  3.  Patient's troponin #116, 150, and 165.  His chest pain is very atypical and would occur the last 2 nights laying down but if he sat up or got up and walked around it resolved.  4.  I would just add an 81 mg aspirin each day.  I would treat him for his pneumonia.  Renal is seeing him for his acute on chronic renal failure.  If his echocardiogram does not show any significant abnormalities then once he is better from a pneumonia standpoint I would just get him up walking the halls.  If he notes no exertional chest pain I would avoid further workup especially with his kidney function.  Would also put him on a low-dose Coreg if his heart rate and blood pressure tolerate.  I would set  him up to see us maybe in 2 to 3 weeks post discharge and we can reevaluate.  If he notes further chest pain or chest pain when he is walking please give us a call.    Electronically signed by Duncan Calzada MD, 07/20/24, 1:37 PM EDT.

## 2024-07-20 NOTE — PROGRESS NOTES
Respiratory Therapist Broncho-Pulmonary Hygiene Progress Note      Patient Name:  Huy Reddy  YOB: 1940    Huy Reddy meets the qualification for Level 1 of the Bronco-Pulmonary Hygiene Protocol. This was based on my daily patient assessment and includes review of chest x-ray results, cough ability and quality, oxygenation, secretions or risk for secretion development and patient mobility.     Broncho-Pulmonary Hygiene Assessment:    Level of Movement: Actively changing positions-requires assistance  Disoriented/Follows Commands    Breath Sounds: Clear to slightly diminished    Cough: Ineffective, weak, frequent    Chest X-Ray: Possible signs of consolidation and/or atelectasis or clear.     Sputum Productions: None or small amount of thin or watery secretions with effective cough    History and Physical: New onset of bronchitis or existing chronic pulmonary conditions.  **(not in an exacerbation)    SpO2 to Oxygen Need: greater than 92% on 4-6L nasal canula    Current SpO2 is: 94 on 5lpm    Based on this information, I have completed the following interventions: Aerobika with bronchodialtor medication or TID      Electronically signed by Sammie Franz RRT, 07/20/24, 11:10 AM EDT.

## 2024-07-20 NOTE — CONSULTS
Nephrology consult note    Chon Alvarez MD     Reason for consult: Renal insufficiency    HPI: This is an 83-year-old gentleman is followed by our group for chronic kidney disease.  Last seen around March.  Patient was noted to have a creatinine of 4.0 at that time.  Presented here with fevers chills cough some mild shortness of breath and was admitted for possible pneumonia versus fluid overload.  Creatinine was 3.95 upon admission.  Patient has refused discussion about dialysis in the past.  Patient denies any chest pain, no severe nausea vomiting no diarrhea no urinary symptoms currently.  Patient does have a history of previous CVA history of hypertension history of peripheral vascular disease history of chronic kidney disease late stage IV history of gastroesophageal reflux disease.    Past Medical History:   Diagnosis Date    Aortic aneurysm     Chronic renal failure     Hyperlipidemia     Hypertension     Monoclonal gammopathy     Peripheral vascular disease     Renal disorder     Skin cancer     TIA (transient ischemic attack)     Vitamin D deficiency      Social History     Socioeconomic History    Marital status:    Tobacco Use    Smoking status: Former     Current packs/day: 0.00     Types: Cigarettes     Start date: 2016     Quit date: 2019     Years since quittin.0    Smokeless tobacco: Never   Vaping Use    Vaping status: Never Used   Substance and Sexual Activity    Alcohol use: Not Currently    Drug use: Never    Sexual activity: Defer     No current facility-administered medications on file prior to encounter.     Current Outpatient Medications on File Prior to Encounter   Medication Sig Dispense Refill    amLODIPine (NORVASC) 10 MG tablet Take 1 tablet by mouth Daily.      hydrALAZINE (APRESOLINE) 25 MG tablet Take 1 tablet by mouth 3 (Three) Times a Day.      Magnesium 250 MG tablet Take 1 tablet by mouth Every Morning.      pravastatin (PRAVACHOL) 40 MG tablet Take 1  "tablet by mouth Daily.      tamsulosin (FLOMAX) 0.4 MG capsule 24 hr capsule Take 1 capsule by mouth Daily. 10 capsule 0    famotidine (PEPCID) 20 MG tablet Take 1 tablet by mouth Every Morning.      HYDROcodone-acetaminophen (NORCO) 5-325 MG per tablet Take 1 tablet by mouth Every 6 (Six) Hours As Needed.      lisinopril-hydrochlorothiazide (PRINZIDE,ZESTORETIC) 20-12.5 MG per tablet Take 1 tablet by mouth Daily.      nystatin-triamcinolone (MYCOLOG) 333620-0.1 UNIT/GM-% ointment Apply 1 application topically to the appropriate area as directed 2 (Two) Times a Day. 15 g 0    traMADol (ULTRAM) 50 MG tablet Take 1 tablet by mouth Every 8 (Eight) Hours As Needed for Moderate Pain. 12 tablet 0    [DISCONTINUED] omeprazole (priLOSEC) 40 MG capsule Take 40 mg by mouth Daily.        amLODIPine, 10 mg, Oral, Daily  [START ON 7/21/2024] azithromycin, 250 mg, Oral, Q24H  cefTRIAXone, 2,000 mg, Intravenous, Q24H  furosemide, 20 mg, Intravenous, Q12H  hydrALAZINE, 25 mg, Oral, TID  pantoprazole, 40 mg, Oral, Q AM  pravastatin, 40 mg, Oral, Nightly  sodium chloride, 10 mL, Intravenous, Q12H  tamsulosin, 0.4 mg, Oral, Daily         acetaminophen    aluminum-magnesium hydroxide-simethicone    senna-docusate sodium **AND** polyethylene glycol **AND** bisacodyl **AND** bisacodyl    hydrALAZINE    ipratropium-albuterol    nitroglycerin    ondansetron    ondansetron ODT    oxyCODONE    sodium chloride    sodium chloride    sodium chloride    traMADol   History reviewed. No pertinent family history.    Review of Systems as mentioned above otherwise negative    Physical Exam:  /52 (BP Location: Right arm, Patient Position: Lying)   Pulse 86   Temp 98.1 °F (36.7 °C) (Oral)   Resp 18   Ht 182.9 cm (72\")   Wt 66.9 kg (147 lb 7.8 oz)   SpO2 97%   BMI 20.00 kg/m²     Intake/Output Summary (Last 24 hours) at 7/20/2024 1205  Last data filed at 7/20/2024 1135  Gross per 24 hour   Intake 100 ml   Output 320 ml   Net -220 ml    " "  General: Patient alert and oriented no acute distress  HEENT: Normocephalic atraumatic pupils are equal round reactive to light extraocular muscles are intact there appears to be normal mouth is clear no erythema no exudates neck supple no adenopathy  Cardiac: Regular rate and rhythm, patient has a 2/6 systolic ejection murmur   lungs: Decreased breath sounds at the bases upper lungs are clear  Abdomen: Bowel sounds positive nontender soft no mass felt no apparent organomegaly noted  Extremities: No edema  or cyanosis  Skin: No acute rashes noted  : Deferred  Neuro: Appears intact with motor and sensory grossly    Lab Results (last 24 hours)       Procedure Component Value Units Date/Time    Procalcitonin [[id number]]  (Normal) Collected: 07/20/24 0511    Specimen: Blood from Arm, Right Updated: 07/20/24 1055     Procalcitonin 0.25 ng/mL     Narrative:      As a Marker for Sepsis (Non-Neonates):    1. <0.5 ng/mL represents a low risk of severe sepsis and/or septic shock.  2. >2 ng/mL represents a high risk of severe sepsis and/or septic shock.    As a Marker for Lower Respiratory Tract Infections that require antibiotic therapy:    PCT on Admission    Antibiotic Therapy       6-12 Hrs later    >0.5                Strongly Recommended  >0.25 - <0.5        Recommended  0.1 - 0.25          Discouraged              Remeasure/reassess PCT  <0.1                Strongly Discouraged     Remeasure/reassess PCT    As 28 day mortality risk marker: \"Change in Procalcitonin Result\" (>80% or <=80%) if Day 0 (or Day 1) and Day 4 values are available. Refer to http://[email or web address]-pct-calculator.com    Change in PCT <=80%  A decrease of PCT levels below or equal to 80% defines a positive change in PCT test result representing a higher risk for 28-day all-cause mortality of patients diagnosed with severe sepsis for septic shock.    Change in PCT >80%  A decrease of PCT levels of more than 80% defines a negative change in PCT result " representing a lower risk for 28-day all-cause mortality of patients diagnosed with severe sepsis or septic shock.    This test is Prognostic not Diagnostic, if elevated correlate with clinical findings before administering antibiotic treatment.        High Sensitivity Troponin T [[id number]]  (Abnormal) Collected: 07/20/24 0511    Specimen: Blood from Arm, Right Updated: 07/20/24 0648     HS Troponin T 165 ng/L     Narrative:      High Sensitive Troponin T Reference Range:  <14.0 ng/L- Negative Female for AMI  <22.0 ng/L- Negative Male for AMI  >=14 - Abnormal Female indicating possible myocardial injury.  >=22 - Abnormal Male indicating possible myocardial injury.   Clinicians would have to utilize clinical acumen, EKG, Troponin, and serial changes to determine if it is an Acute Myocardial Infarction or myocardial injury due to an underlying chronic condition.         Hemoglobin A1c [[id number]] Collected: 07/20/24 0511    Specimen: Blood from Arm, Right Updated: 07/20/24 0540    TSH Rfx On Abnormal To Free T4 [[id number]]  (Normal) Collected: 07/20/24 0020    Specimen: Blood from Arm, Right Updated: 07/20/24 0333     TSH 1.530 uIU/mL     Lipid Panel [[id number]] Collected: 07/20/24 0020    Specimen: Blood from Arm, Right Updated: 07/20/24 0328     Total Cholesterol 148 mg/dL      Triglycerides 68 mg/dL      HDL Cholesterol 47 mg/dL      LDL Cholesterol  87 mg/dL      VLDL Cholesterol 14 mg/dL      LDL/HDL Ratio 1.86    Narrative:      Cholesterol Reference Ranges  (U.S. Department of Health and Human Services ATP III Classifications)    Desirable          <200 mg/dL  Borderline High    200-239 mg/dL  High Risk          >240 mg/dL      Triglyceride Reference Ranges  (U.S. Department of Health and Human Services ATP III Classifications)    Normal           <150 mg/dL  Borderline High  150-199 mg/dL  High             200-499 mg/dL  Very High        >500 mg/dL    HDL Reference Ranges  (U.S. Department of Health and Human  Services ATP III Classifications)    Low     <40 mg/dl (major risk factor for CHD)  High    >60 mg/dl ('negative' risk factor for CHD)        LDL Reference Ranges  (U.S. Department of Health and Human Services ATP III Classifications)    Optimal          <100 mg/dL  Near Optimal     100-129 mg/dL  Borderline High  130-159 mg/dL  High             160-189 mg/dL  Very High        >189 mg/dL    Lactic Acid, Plasma [[id number]]  (Normal) Collected: 07/20/24 0149    Specimen: Blood from Arm, Right Updated: 07/20/24 0217     Lactate 1.2 mmol/L     Blood Culture - Blood, Arm, Left [[id number]] Collected: 07/20/24 0159    Specimen: Blood from Arm, Left Updated: 07/20/24 0204    Blood Culture - Blood, Arm, Right [[id number]] Collected: 07/20/24 0149    Specimen: Blood from Arm, Right Updated: 07/20/24 0154    High Sensitivity Troponin T 2Hr [[id number]]  (Abnormal) Collected: 07/20/24 0020    Specimen: Blood from Arm, Right Updated: 07/20/24 0104     HS Troponin T 150 ng/L      Troponin T Delta 34 ng/L     Narrative:      High Sensitive Troponin T Reference Range:  <14.0 ng/L- Negative Female for AMI  <22.0 ng/L- Negative Male for AMI  >=14 - Abnormal Female indicating possible myocardial injury.  >=22 - Abnormal Male indicating possible myocardial injury.   Clinicians would have to utilize clinical acumen, EKG, Troponin, and serial changes to determine if it is an Acute Myocardial Infarction or myocardial injury due to an underlying chronic condition.         COVID-19, FLU A/B, RSV PCR 1 HR TAT - Swab, Nasopharynx [[id number]]  (Normal) Collected: 07/19/24 2234    Specimen: Swab from Nasopharynx Updated: 07/19/24 2336     COVID19 Not Detected     Influenza A PCR Not Detected     Influenza B PCR Not Detected     RSV, PCR Not Detected    Narrative:      Fact sheet for providers: https://www.fda.gov/media/646868/download    Fact sheet for patients: https://www.fda.gov/media/591279/download    Test performed by PCR.    High  Sensitivity Troponin T [[id number]]  (Abnormal) Collected: 07/19/24 2236    Specimen: Blood Updated: 07/19/24 2332     HS Troponin T 116 ng/L     Narrative:      High Sensitive Troponin T Reference Range:  <14.0 ng/L- Negative Female for AMI  <22.0 ng/L- Negative Male for AMI  >=14 - Abnormal Female indicating possible myocardial injury.  >=22 - Abnormal Male indicating possible myocardial injury.   Clinicians would have to utilize clinical acumen, EKG, Troponin, and serial changes to determine if it is an Acute Myocardial Infarction or myocardial injury due to an underlying chronic condition.         Comprehensive Metabolic Panel [[id number]]  (Abnormal) Collected: 07/19/24 2236    Specimen: Blood Updated: 07/19/24 2303     Glucose 123 mg/dL      BUN 41 mg/dL      Creatinine 3.95 mg/dL      Sodium 136 mmol/L      Potassium 4.8 mmol/L      Chloride 103 mmol/L      CO2 22.1 mmol/L      Calcium 8.7 mg/dL      Total Protein 6.1 g/dL      Albumin 3.7 g/dL      ALT (SGPT) 10 U/L      AST (SGOT) 16 U/L      Alkaline Phosphatase 92 U/L      Total Bilirubin 0.3 mg/dL      Globulin 2.4 gm/dL      A/G Ratio 1.5 g/dL      BUN/Creatinine Ratio 10.4     Anion Gap 10.9 mmol/L      eGFR 14.5 mL/min/1.73      Comment: <15 Indicative of kidney failure       Narrative:      GFR Normal >60  Chronic Kidney Disease <60  Kidney Failure <15    The GFR formula is only valid for adults with stable renal function between ages 18 and 70.    Lipase [[id number]]  (Normal) Collected: 07/19/24 2236    Specimen: Blood Updated: 07/19/24 2303     Lipase 55 U/L     Magnesium [[id number]]  (Normal) Collected: 07/19/24 2236    Specimen: Blood Updated: 07/19/24 2303     Magnesium 1.8 mg/dL     BNP [[id number]]  (Abnormal) Collected: 07/19/24 2236    Specimen: Blood Updated: 07/19/24 2301     proBNP 16,905.0 pg/mL     Narrative:      This assay is used as an aid in the diagnosis of individuals suspected of having heart failure. It can be used as an aid in the  diagnosis of acute decompensated heart failure (ADHF) in patients presenting with signs and symptoms of ADHF to the emergency department (ED). In addition, NT-proBNP of <300 pg/mL indicates ADHF is not likely.    Age Range Result Interpretation  NT-proBNP Concentration (pg/mL:      <50             Positive            >450                   Gray                 300-450                    Negative             <300    50-75           Positive            >900                  Gray                300-900                  Negative            <300      >75             Positive            >1800                  Gray                300-1800                  Negative            <300    [place] Draw [[id number]] Collected: 07/19/24 2236    Specimen: Blood Updated: 07/19/24 2245    Narrative:      The following orders were created for panel order [place] Draw.  Procedure                               Abnormality         Status                     ---------                               -----------         ------                     Green Top (Gel)[[id number]]                                  Final result               Lavender Top[[id number]]                                     Final result               Gold Top - SST[[id number]]                                   Final result               Light Blue Top[[id number]]                                   Final result                 Please view results for these tests on the individual orders.    Green Top (Gel) [[id number]] Collected: 07/19/24 2236    Specimen: Blood Updated: 07/19/24 2245     Extra Tube Hold for add-ons.     Comment: Auto resulted.       Lavender Top [[id number]] Collected: 07/19/24 2236    Specimen: Blood Updated: 07/19/24 2245     Extra Tube hold for add-on     Comment: Auto resulted       Gold Top - SST [[id number]] Collected: 07/19/24 2236    Specimen: Blood Updated: 07/19/24 2245     Extra Tube Hold for add-ons.     Comment: Auto resulted.       Light Blue Top  [[id number]] Collected: 07/19/24 2236    Specimen: Blood from Arm, Right Updated: 07/19/24 2245     Extra Tube Hold for add-ons.     Comment: Auto resulted       CBC & Differential [[id number]]  (Abnormal) Collected: 07/19/24 2236    Specimen: Blood Updated: 07/19/24 2243    Narrative:      The following orders were created for panel order CBC & Differential.  Procedure                               Abnormality         Status                     ---------                               -----------         ------                     CBC Auto Differential[[id number]]        Abnormal            Final result                 Please view results for these tests on the individual orders.    CBC Auto Differential [[id number]]  (Abnormal) Collected: 07/19/24 2236    Specimen: Blood Updated: 07/19/24 2243     WBC 15.04 10*3/mm3      RBC 3.08 10*6/mm3      Hemoglobin 9.7 g/dL      Hematocrit 29.2 %      MCV 94.8 fL      MCH 31.5 pg      MCHC 33.2 g/dL      RDW 13.5 %      RDW-SD 46.5 fl      MPV 9.3 fL      Platelets 278 10*3/mm3      Neutrophil % 88.4 %      Lymphocyte % 3.3 %      Monocyte % 7.2 %      Eosinophil % 0.1 %      Basophil % 0.5 %      Immature Grans % 0.5 %      Neutrophils, Absolute 13.30 10*3/mm3      Lymphocytes, Absolute 0.50 10*3/mm3      Monocytes, Absolute 1.08 10*3/mm3      Eosinophils, Absolute 0.01 10*3/mm3      Basophils, Absolute 0.07 10*3/mm3      Immature Grans, Absolute 0.08 10*3/mm3      nRBC 0.0 /100 WBC              Imaging Results (Last 24 Hours)       Procedure Component Value Units Date/Time    CT Chest Without Contrast Diagnostic [[id number]] Collected: 07/20/24 1127     Updated: 07/20/24 1134    Narrative:      CT CHEST WO CONTRAST DIAGNOSTIC    Date of Exam: 7/20/2024 10:53 AM EDT    Indication: Hypoxia.  Left lower lobe pneumonia.  CHF.    Comparison: Chest x-ray dated 7/19/2024    Technique: Axial CT images were obtained of the chest without contrast administration.  Reconstructed coronal and  sagittal images were also obtained. Automated exposure control and iterative construction methods were used.      FINDINGS:    Thoracic inlet: Unremarkable.    Great vessels: Atherosclerotic plaque is seen within the thoracic aorta and proximal arch vessels    Mediastinum/Erendira: No pathologically enlarged mediastinal lymph nodes are seen. The esophagus is unremarkable. The esophagus appears unremarkable.    Lung parenchyma/pleura: There are small bilateral pleural effusions with bibasilar atelectasis. There is also patchy airspace disease within both lungs, left greater than right, concerning for infiltrates. No suspicious pulmonary nodule is seen. No   pneumothorax is seen.    Trachea and airways: The trachea and central airways appear unremarkable.    Heart and pericardium: Coronary artery calcifications are present. Small pericardial effusion is seen.    Chest wall: No acute or suspicious osseous or soft tissue lesion is identified. There are degenerative changes within the spine.    Upper abdomen: No acute abnormality is identified within the visualized upper abdomen      Impression:      1.Patchy bilateral airspace disease, left greater than right, concerning for multifocal infiltrates.  2.Small bilateral pleural effusions with bibasilar atelectasis.  3.Additional findings as detailed above.        Electronically Signed: Francisco Arellano MD    7/20/2024 11:32 AM EDT    Workstation ID: [id number]    XR Chest 1 View [[id number]] Collected: 07/19/24 2259     Updated: 07/19/24 2301    Narrative:      XR CHEST 1 VW    Date of Exam: 7/19/2024 10:55 PM EDT    Indication: Chest Pain Triage Protocol    Comparison: 1/18/2023    Findings:  Cardiac and mediastinal contours are normal. There is atherosclerotic disease in the aorta. There is a small left pleural effusion with infiltrate or atelectasis at the left base. Right lung is clear. No pneumothorax.      Impression:      Small left effusion with infiltrate or atelectasis  at the left lung base.      Electronically Signed: Madi Fox MD    7/19/2024 10:59 PM EDT    Workstation ID: [id number]             Assessment/plan:    1.  Chronic kidney disease with stage IV early stage V.  Patient in the past has refused dialysis options.  Patient's renal function actually appears to be stable and probably at or near his baseline.  Agree with some IV Lasix due to his pleural effusions.  Patient questionable possible monoclonal gammopathy per his daughter although last kappa lambda ratio was 1.88 which is actually normal for his level of kidney function and a negative UPEP.  Will recheck some of those studies while here.  For now I would just avoid IV contrast nonsteroidals and other nephrotoxins.    2.  Hyperparathyroid: Patient's PTH was above 100 last checked, will start some Rocaltrol here follow calcium and phosphorus    3.  Hypertension: Agree with treatment, would not use ACE inhibitor or angiotensin receptor blocker due to his significant renal insufficiency.    4.  Fevers chills shortness of breath: Patient with possible pneumonia on CT as well as increased WBC on antibiotics currently    5.  History of previous CVA in the past    6.  Anemia: Hemoglobin decreased, will check iron stores if not already done    7.  History of peripheral vascular disease    Thank you very much will continue to follow      Chon Alvarez MD

## 2024-07-20 NOTE — PLAN OF CARE
Problem: Adult Inpatient Plan of Care  Goal: Plan of Care Review  Outcome: Ongoing, Progressing  Flowsheets (Taken 7/20/2024 1640)  Plan of Care Reviewed With: patient  Outcome Evaluation: Patient alert and oriented throughout shift. On 3 liters NC with no signs of distress. Patient plan of care discussed at bedside. Patient is very pleasant. CT and Echo completed on shift. Will continue with patient plan of care.   Goal Outcome Evaluation:  Plan of Care Reviewed With: patient           Outcome Evaluation: Patient alert and oriented throughout shift. On 3 liters NC with no signs of distress. Patient plan of care discussed at bedside. Patient is very pleasant. CT and Echo completed on shift. Will continue with patient plan of care.

## 2024-07-20 NOTE — ED PROVIDER NOTES
Time: 10:21 PM EDT  Date of encounter:  7/19/2024  Independent Historian/Clinical History and Information was obtained by:   Patient and Family    History is limited by: N/A    Chief Complaint: Fever      History of Present Illness:  Patient is a 83 y.o. year old male who presents to the emergency department for evaluation of that started today.  Patient states he is also having bodyaches.  Fever was 105 PTA.  No antipyretics given PTA.  Denies cough.    Patient describes some mild anterior chest pain but denies cough.  Family reports elevated temperature at home but they did not treat as patient is undergoing dermatologic surgery for skin cancer later this week and has been told not to take over-the-counter medication.    HPI    Patient Care Team  Primary Care Provider: Allison Villafana MD    Past Medical History:     Allergies   Allergen Reactions    Aspirin Other (See Comments)      thins blood     Past Medical History:   Diagnosis Date    Aortic aneurysm     Chronic renal failure     Hyperlipidemia     Hypertension     Monoclonal gammopathy     Peripheral vascular disease     Renal disorder     Skin cancer     TIA (transient ischemic attack)     Vitamin D deficiency      Past Surgical History:   Procedure Laterality Date    TONSILLECTOMY       History reviewed. No pertinent family history.    Home Medications:  Prior to Admission medications    Medication Sig Start Date End Date Taking? Authorizing Provider   amLODIPine (NORVASC) 10 MG tablet Take 10 mg by mouth Daily.    Cholo Jaime MD   HYDROcodone-acetaminophen (NORCO) 5-325 MG per tablet Take 1 tablet by mouth Every 6 (Six) Hours As Needed. 2/14/23   Cholo Jaiem MD   lisinopril-hydrochlorothiazide (PRINZIDE,ZESTORETIC) 20-12.5 MG per tablet Take 1 tablet by mouth Daily.    Cholo Jaime MD   Magnesium 250 MG tablet Take 250 mg by mouth Every Other Day.    Cholo Jaime MD   nystatin-triamcinolone (MYCOLOG) 942831-2.1  "UNIT/GM-% ointment Apply 1 application topically to the appropriate area as directed 2 (Two) Times a Day. 23   Dhaval Gallegos MD   omeprazole (priLOSEC) 40 MG capsule Take 40 mg by mouth Daily. 21   ProviderCholo MD   pravastatin (PRAVACHOL) 40 MG tablet Take 40 mg by mouth Daily.    Cholo Jaime MD   tamsulosin (FLOMAX) 0.4 MG capsule 24 hr capsule Take 1 capsule by mouth Daily. 23   Alex Cohen DO   traMADol (ULTRAM) 50 MG tablet Take 1 tablet by mouth Every 8 (Eight) Hours As Needed for Moderate Pain. 23   Tesfaye Bowens DO        Social History:   Social History     Tobacco Use    Smoking status: Former     Current packs/day: 0.00     Types: Cigarettes     Start date: 2016     Quit date: 2019     Years since quittin.0    Smokeless tobacco: Never   Vaping Use    Vaping status: Never Used   Substance Use Topics    Alcohol use: Not Currently    Drug use: Never         Review of Systems:  Review of Systems   Constitutional:  Positive for fever. Negative for chills.   HENT:  Negative for congestion, ear pain and sore throat.    Eyes:  Negative for pain.   Respiratory:  Positive for chest tightness and shortness of breath. Negative for cough.    Cardiovascular:  Negative for chest pain.   Gastrointestinal:  Negative for abdominal pain, diarrhea, nausea and vomiting.   Genitourinary:  Negative for flank pain and hematuria.   Musculoskeletal:  Positive for myalgias. Negative for joint swelling.   Skin:  Negative for pallor.   Neurological:  Negative for seizures and headaches.   All other systems reviewed and are negative.       Physical Exam:  BP (!) 192/90 (BP Location: Right arm, Patient Position: Lying) Comment: RN aware  Pulse 93   Temp 98.6 °F (37 °C) (Oral)   Resp 18   Ht 182.9 cm (72\")   Wt 66.9 kg (147 lb 7.8 oz)   SpO2 (!) 84%   BMI 20.00 kg/m²     Physical Exam  Vitals and nursing note reviewed.   Constitutional:       General: He is not in acute " distress.     Appearance: Normal appearance. He is not toxic-appearing.   HENT:      Head: Normocephalic and atraumatic.      Jaw: There is normal jaw occlusion.   Eyes:      General: Lids are normal.      Extraocular Movements: Extraocular movements intact.      Conjunctiva/sclera: Conjunctivae normal.      Pupils: Pupils are equal, round, and reactive to light.   Cardiovascular:      Rate and Rhythm: Normal rate and regular rhythm.      Pulses: Normal pulses.      Heart sounds: Normal heart sounds.   Pulmonary:      Effort: Pulmonary effort is normal. No respiratory distress.      Breath sounds: Examination of the left-lower field reveals rales. Rales present. No wheezing or rhonchi.   Abdominal:      General: Abdomen is flat. There is no distension.      Palpations: Abdomen is soft.      Tenderness: There is no abdominal tenderness. There is no guarding or rebound.   Musculoskeletal:         General: Normal range of motion.      Cervical back: Normal range of motion and neck supple.      Right lower leg: No edema.      Left lower leg: No edema.   Skin:     General: Skin is warm and dry.      Coloration: Skin is not cyanotic.   Neurological:      Mental Status: He is alert and oriented to person, place, and time. Mental status is at baseline.   Psychiatric:         Attention and Perception: Attention and perception normal.         Mood and Affect: Mood normal.            Procedures:  Procedures      Medical Decision Making:      Comorbidities that affect care:    Renal disease, hypertension, hyperlipidemia    External Notes reviewed:    Previous Clinic Note: Outpatient family medicine visit 7/8/2024      The following orders were placed and all results were independently analyzed by me:  Orders Placed This Encounter   Procedures    COVID-19, FLU A/B, RSV PCR 1 HR TAT - Swab, Nasopharynx    Blood Culture - Blood,    Blood Culture - Blood,    XR Chest 1 View    [place] Draw    High Sensitivity Troponin T     Comprehensive Metabolic Panel    Lipase    BNP    Magnesium    CBC Auto Differential    High Sensitivity Troponin T 2Hr    Lactic Acid, Plasma    High Sensitivity Troponin T    Lipid Panel    Hemoglobin A1c    TSH Rfx On Abnormal To Free T4    Diet: Cardiac; Healthy Heart (2-3 Na+); Fluid Consistency: Thin (IDDSI 0)    Undress & Gown    Vital Signs    Intake & Output    Weigh Patient    Oral Care    Maintain IV Access    Telemetry - Place Orders & Notify Provider of Results When Patient Experiences Acute Chest Pain, Dysrhythmia or Respiratory Distress    Continuous Pulse Oximetry    Code Status and Medical Interventions:    Hospitalist (on-call MD unless specified)    Inpatient Cardiology Consult    Oxygen Therapy- Nasal Cannula; Titrate 1-6 LPM Per SpO2; 90 - 95%    ECG 12 Lead ED Triage Standing Order; Chest Pain    ECG 12 Lead ED Triage Standing Order; Chest Pain    Adult Transthoracic Echo Complete W/ Cont if Necessary Per Protocol    Insert Peripheral IV    Insert Peripheral IV    Inpatient Admission    CBC & Differential    Green Top (Gel)    Lavender Top    Gold Top - SST    Light Blue Top       Medications Given in the Emergency Department:  Medications   sodium chloride 0.9 % flush 10 mL (has no administration in time range)   sodium chloride 0.9 % flush 10 mL (10 mL Intravenous Given 7/20/24 0526)   sodium chloride 0.9 % flush 10 mL (has no administration in time range)   sodium chloride 0.9 % infusion 40 mL (has no administration in time range)   nitroglycerin (NITROSTAT) SL tablet 0.4 mg (has no administration in time range)   cefTRIAXone (ROCEPHIN) 2,000 mg in sodium chloride 0.9 % 100 mL IVPB-VTB (has no administration in time range)   AZITHROMYCIN 500 MG/250 ML 0.9% NS IVPB (vial-mate) (500 mg Intravenous New Bag 7/20/24 0526)   furosemide (LASIX) injection 20 mg (20 mg Intravenous Given 7/20/24 0350)   hydrALAZINE (APRESOLINE) injection 10 mg (has no administration in time range)   ondansetron  (ZOFRAN) injection 4 mg (has no administration in time range)   acetaminophen (TYLENOL) tablet 650 mg (has no administration in time range)   pantoprazole (PROTONIX) EC tablet 40 mg (40 mg Oral Given 7/20/24 0526)   pravastatin (PRAVACHOL) tablet 40 mg (has no administration in time range)   tamsulosin (FLOMAX) 24 hr capsule 0.4 mg (has no administration in time range)   ipratropium-albuterol (DUO-NEB) nebulizer solution 3 mL (has no administration in time range)   amLODIPine (NORVASC) tablet 10 mg (10 mg Oral Given 7/20/24 0526)   hydrALAZINE (APRESOLINE) tablet 25 mg (25 mg Oral Given 7/20/24 0526)   cefTRIAXone (ROCEPHIN) 1,000 mg in sodium chloride 0.9 % 100 mL IVPB-VTB (0 mg Intravenous Stopped 7/20/24 0244)   doxycycline (VIBRAMYCIN) 100 mg in sodium chloride 0.9 % 100 mL IVPB-VTB (100 mg Intravenous New Bag 7/20/24 0303)        ED Course:    ED Course as of 07/20/24 0725   Fri Jul 19, 2024 2224 --- PROVIDER IN TRIAGE NOTE ---    The patient was evaluated by Osmar bhatt in triage. Orders were placed and the patient is currently awaiting disposition.    [AJ]   Sat Jul 20, 2024   0058 My interpretation of EKG: Sinus rhythm 75, no acute ischemia, unchanged from previous. [JS]   0151 I discussed patient's workup and presentation with the hospitalist who agrees to admission.  The patient's airway is intact, vital signs, and respiratory status are safe for admission at this time. [JS]      ED Course User Index  [AJ] Osmar Palacios PA-C  [JS] Dhaval Gallegos MD       Labs:    Lab Results (last 24 hours)       Procedure Component Value Units Date/Time    COVID-19, FLU A/B, RSV PCR 1 HR TAT - Swab, Nasopharynx [[id number]]  (Normal) Collected: 07/19/24 2234    Specimen: Swab from Nasopharynx Updated: 07/19/24 2336     COVID19 Not Detected     Influenza A PCR Not Detected     Influenza B PCR Not Detected     RSV, PCR Not Detected    Narrative:      Fact sheet for providers:  https://www.fda.gov/media/718490/download    Fact sheet for patients: https://www.fda.gov/media/606789/download    Test performed by PCR.    High Sensitivity Troponin T [[id number]]  (Abnormal) Collected: 07/19/24 2236    Specimen: Blood Updated: 07/19/24 2332     HS Troponin T 116 ng/L     Narrative:      High Sensitive Troponin T Reference Range:  <14.0 ng/L- Negative Female for AMI  <22.0 ng/L- Negative Male for AMI  >=14 - Abnormal Female indicating possible myocardial injury.  >=22 - Abnormal Male indicating possible myocardial injury.   Clinicians would have to utilize clinical acumen, EKG, Troponin, and serial changes to determine if it is an Acute Myocardial Infarction or myocardial injury due to an underlying chronic condition.         CBC & Differential [[id number]]  (Abnormal) Collected: 07/19/24 2236    Specimen: Blood Updated: 07/19/24 2243    Narrative:      The following orders were created for panel order CBC & Differential.  Procedure                               Abnormality         Status                     ---------                               -----------         ------                     CBC Auto Differential[[id number]]        Abnormal            Final result                 Please view results for these tests on the individual orders.    Comprehensive Metabolic Panel [[id number]]  (Abnormal) Collected: 07/19/24 2236    Specimen: Blood Updated: 07/19/24 2303     Glucose 123 mg/dL      BUN 41 mg/dL      Creatinine 3.95 mg/dL      Sodium 136 mmol/L      Potassium 4.8 mmol/L      Chloride 103 mmol/L      CO2 22.1 mmol/L      Calcium 8.7 mg/dL      Total Protein 6.1 g/dL      Albumin 3.7 g/dL      ALT (SGPT) 10 U/L      AST (SGOT) 16 U/L      Alkaline Phosphatase 92 U/L      Total Bilirubin 0.3 mg/dL      Globulin 2.4 gm/dL      A/G Ratio 1.5 g/dL      BUN/Creatinine Ratio 10.4     Anion Gap 10.9 mmol/L      eGFR 14.5 mL/min/1.73      Comment: <15 Indicative of kidney failure       Narrative:       GFR Normal >60  Chronic Kidney Disease <60  Kidney Failure <15    The GFR formula is only valid for adults with stable renal function between ages 18 and 70.    Lipase [[id number]]  (Normal) Collected: 07/19/24 2236    Specimen: Blood Updated: 07/19/24 2303     Lipase 55 U/L     BNP [[id number]]  (Abnormal) Collected: 07/19/24 2236    Specimen: Blood Updated: 07/19/24 2301     proBNP 16,905.0 pg/mL     Narrative:      This assay is used as an aid in the diagnosis of individuals suspected of having heart failure. It can be used as an aid in the diagnosis of acute decompensated heart failure (ADHF) in patients presenting with signs and symptoms of ADHF to the emergency department (ED). In addition, NT-proBNP of <300 pg/mL indicates ADHF is not likely.    Age Range Result Interpretation  NT-proBNP Concentration (pg/mL:      <50             Positive            >450                   Gray                 300-450                    Negative             <300    50-75           Positive            >900                  Gray                300-900                  Negative            <300      >75             Positive            >1800                  Gray                300-1800                  Negative            <300    Magnesium [[id number]]  (Normal) Collected: 07/19/24 2236    Specimen: Blood Updated: 07/19/24 2303     Magnesium 1.8 mg/dL     CBC Auto Differential [[id number]]  (Abnormal) Collected: 07/19/24 2236    Specimen: Blood Updated: 07/19/24 2243     WBC 15.04 10*3/mm3      RBC 3.08 10*6/mm3      Hemoglobin 9.7 g/dL      Hematocrit 29.2 %      MCV 94.8 fL      MCH 31.5 pg      MCHC 33.2 g/dL      RDW 13.5 %      RDW-SD 46.5 fl      MPV 9.3 fL      Platelets 278 10*3/mm3      Neutrophil % 88.4 %      Lymphocyte % 3.3 %      Monocyte % 7.2 %      Eosinophil % 0.1 %      Basophil % 0.5 %      Immature Grans % 0.5 %      Neutrophils, Absolute 13.30 10*3/mm3      Lymphocytes, Absolute 0.50 10*3/mm3      Monocytes,  Absolute 1.08 10*3/mm3      Eosinophils, Absolute 0.01 10*3/mm3      Basophils, Absolute 0.07 10*3/mm3      Immature Grans, Absolute 0.08 10*3/mm3      nRBC 0.0 /100 WBC     High Sensitivity Troponin T 2Hr [[id number]]  (Abnormal) Collected: 07/20/24 0020    Specimen: Blood from Arm, Right Updated: 07/20/24 0104     HS Troponin T 150 ng/L      Troponin T Delta 34 ng/L     Narrative:      High Sensitive Troponin T Reference Range:  <14.0 ng/L- Negative Female for AMI  <22.0 ng/L- Negative Male for AMI  >=14 - Abnormal Female indicating possible myocardial injury.  >=22 - Abnormal Male indicating possible myocardial injury.   Clinicians would have to utilize clinical acumen, EKG, Troponin, and serial changes to determine if it is an Acute Myocardial Infarction or myocardial injury due to an underlying chronic condition.         Lipid Panel [[id number]] Collected: 07/20/24 0020    Specimen: Blood from Arm, Right Updated: 07/20/24 0328     Total Cholesterol 148 mg/dL      Triglycerides 68 mg/dL      HDL Cholesterol 47 mg/dL      LDL Cholesterol  87 mg/dL      VLDL Cholesterol 14 mg/dL      LDL/HDL Ratio 1.86    Narrative:      Cholesterol Reference Ranges  (U.[place]. Department of Health and Human Services ATP III Classifications)    Desirable          <200 mg/dL  Borderline High    200-239 mg/dL  High Risk          >240 mg/dL      Triglyceride Reference Ranges  (U.S. Department of Health and Human Services ATP III Classifications)    Normal           <150 mg/dL  Borderline High  150-199 mg/dL  High             200-499 mg/dL  Very High        >500 mg/dL    HDL Reference Ranges  (U.S. Department of Health and Human Services ATP III Classifications)    Low     <40 mg/dl (major risk factor for CHD)  High    >60 mg/dl ('negative' risk factor for CHD)        LDL Reference Ranges  (U.S. Department of Health and Human Services ATP III Classifications)    Optimal          <100 mg/dL  Near Optimal     100-129 mg/dL  Borderline High   130-159 mg/dL  High             160-189 mg/dL  Very High        >189 mg/dL    TSH Rfx On Abnormal To Free T4 [[id number]]  (Normal) Collected: 07/20/24 0020    Specimen: Blood from Arm, Right Updated: 07/20/24 0333     TSH 1.530 uIU/mL     Blood Culture - Blood, Arm, Right [[id number]] Collected: 07/20/24 0149    Specimen: Blood from Arm, Right Updated: 07/20/24 0154    Lactic Acid, Plasma [[id number]]  (Normal) Collected: 07/20/24 0149    Specimen: Blood from Arm, Right Updated: 07/20/24 0217     Lactate 1.2 mmol/L     Blood Culture - Blood, Arm, Left [[id number]] Collected: 07/20/24 0159    Specimen: Blood from Arm, Left Updated: 07/20/24 0204    High Sensitivity Troponin T [[id number]]  (Abnormal) Collected: 07/20/24 0511    Specimen: Blood from Arm, Right Updated: 07/20/24 0648     HS Troponin T 165 ng/L     Narrative:      High Sensitive Troponin T Reference Range:  <14.0 ng/L- Negative Female for AMI  <22.0 ng/L- Negative Male for AMI  >=14 - Abnormal Female indicating possible myocardial injury.  >=22 - Abnormal Male indicating possible myocardial injury.   Clinicians would have to utilize clinical acumen, EKG, Troponin, and serial changes to determine if it is an Acute Myocardial Infarction or myocardial injury due to an underlying chronic condition.         Hemoglobin A1c [[id number]] Collected: 07/20/24 0511    Specimen: Blood from Arm, Right Updated: 07/20/24 0540             Imaging:    XR Chest 1 View    Result Date: 7/19/2024  XR CHEST 1 VW Date of Exam: 7/19/2024 10:55 PM EDT Indication: Chest Pain Triage Protocol Comparison: 1/18/2023 Findings: Cardiac and mediastinal contours are normal. There is atherosclerotic disease in the aorta. There is a small left pleural effusion with infiltrate or atelectasis at the left base. Right lung is clear. No pneumothorax.     Small left effusion with infiltrate or atelectasis at the left lung base. Electronically Signed: Madi Fox MD  7/19/2024 10:59 PM EDT   Workstation ID: [id number]       Differential Diagnosis and Discussion:    Fever: Based on the complaint of fever, differential diagnosis includes but is not limited to meningitis, pneumonia, pyelonephritis, acute uti,  systemic immune response syndrome, sepsis, viral syndrome, fungal infection, tick born illness and other bacterial infections.    All labs were reviewed and interpreted by me.  All X-rays impressions were independently interpreted by me.  EKG was interpreted by me.    MDM     Amount and/or Complexity of Data Reviewed  Decide to obtain previous medical records or to obtain history from someone other than the patient: yes         Critical Care Note: Total Critical Care time of 35 minutes. Total critical care time documented does not include time spent on separately billed procedures for services of nurses or physician assistants. I personally saw and examined the patient. I have reviewed all diagnostic interpretations and treatment plans as written. I was present for the key portions of any procedures performed and the inclusive time noted in any critical care statement. Critical care time includes patient management by me, time spent at the patients bedside,  time to review lab and imaging results, discussing patient care, documentation in the medical record, and time spent with family or caregiver.    Sepsis criteria was met in the emergency department and the Sepsis protocol (including antibiotic administration) was initiated.      SIRS criteria considered:   1.  Temperature > 100.4 or <96.8    2.  Heart Rate > 90    3.  Respiratory Rate > 22    4.  WBC > 12K or <4K.             Severe Sepsis:     Respiratory: Mechanical Ventilation or Bipap  Hypotension: SBP > 90 or MAP < 65  Renal: Creatinine > 2  Metabolic: Lactic Acid > 2  Hematologic: Platelets < 100K or INR > 1.5  Hepatic: BILI  >  2  CNS: Sudden AMS     Septic Shock:     Severe Sepsis + Persistent hypotension or Lactic Acid > 4     Normal  saline bolus, Antibiotics, and final disposition was based on these definitions.        Sepsis was recognized at 0126    Antibiotics were ordered.     30 cc/kg bolus was NOT indicated.       Patient did not receive the recommended 30ml/kg fluid bolus for sepsis because it would be harmful or detrimental to the patient.    The patient has concern for fluid overload.   The patient was ordered 1L of fluids.    Total Critical Care time of 35 minutes. Total critical care time documented does not include time spent on separately billed procedures for services of nurses or physician assistants. I personally saw and examined the patient. I have reviewed all diagnostic interpretations and treatment plans as written. I was present for the key portions of any procedures performed and the inclusive time noted in any critical care statement. Critical care time includes patient management by me, time spent at the patients bedside,  time to review lab and imaging results, discussing patient care, documentation in the medical record, and time spent with family or caregiver.    Patient Care Considerations:          Consultants/Shared Management Plan:    Hospitalist: I have discussed the case with the hospitalist who agrees to accept the patient for admission.    Social Determinants of Health:    Patient has presented with family members who are responsible, reliable and will ensure follow up care.      Disposition and Care Coordination:    Admit:   Through independent evaluation of the patient's history, physical, and imperical data, the patient meets criteria for inpatient admission to the hospital.        Final diagnoses:   Pneumonia of left lower lobe due to infectious organism   Pleural effusion on left   Leukocytosis, unspecified type   Elevated troponin   Sepsis, due to unspecified organism, unspecified whether acute organ dysfunction present        ED Disposition       ED Disposition   Decision to Admit    Condition   --     Comment   Level of Care: Telemetry [5]   Diagnosis: PNA (pneumonia) [[id number]]   Admitting Physician: VIRY POLANCO [[id number]]   Certification: I Certify That Inpatient Hospital Services Are Medically Necessary For Greater Than 2 Midnights                 This medical record created using voice recognition software.             Dhaval Gallegos MD  07/20/24 8413

## 2024-07-20 NOTE — PLAN OF CARE
Goal Outcome Evaluation:  Plan of Care Reviewed With: patient        Progress: improving  Outcome Evaluation: Pt arrived to the floor towards the end of this shift. BP meds restarted. O2 turned down from 6L when he arrived to the floor to 4L. Mild tenderness in LLQ. One incontinent episode this shift.

## 2024-07-20 NOTE — H&P
[facility] HISTORY AND PHYSICAL  Date: 2024   Patient Name: Huy Reddy  : 1940  MRN: [id number]  Primary Care Physician:  Allison Villafana MD  Date of admission: 2024    Subjective   Subjective     Chief Complaint:    HPI:    Huy Reddy is a 83 y.o. male with past medical history of hypertension, hyperlipidemia, CKD presents to the ED due to fever and chest pain.  Patient has been dealing with bodyaches and fever for the past day.  In addition patient denies any chest pain is worse with coughing.  Denies shortness of breath, abdominal pain, nausea, vomiting diarrhea, dysuria, headache or palpitations.  In the ED, patient's  vitals show temperature 99.5, blood pressure 156/59.  94% room air.  Labs show troponin 116 and repeat 150, BNP 16,000, sodium 136, creatinine 3.95, white blood cell 1504 hemoglobin 9.7.  Chest x-ray shows effusion.  Patient on IV antibiotics.  Patient admitted to floors for further management.    Personal History     Past Medical History:  Past Medical History:   Diagnosis Date    Hyperlipidemia     Hypertension     Renal disorder        Past Surgical History:  Past Surgical History:   Procedure Laterality Date    TONSILLECTOMY         Family History:   No family history on file.    Social History:   Social History     Socioeconomic History    Marital status:    Tobacco Use    Smoking status: Former     Current packs/day: 0.00     Types: Cigarettes     Start date: 2016     Quit date: 2019     Years since quittin.0    Smokeless tobacco: Never   Vaping Use    Vaping status: Never Used   Substance and Sexual Activity    Alcohol use: Not Currently    Drug use: Never    Sexual activity: Defer       Home Medications:  HYDROcodone-acetaminophen, Magnesium, amLODIPine, lisinopril-hydrochlorothiazide, nystatin-triamcinolone, omeprazole, pravastatin, tamsulosin, and traMADol    Allergies:  Allergies   Allergen Reactions    Aspirin Other (See  Comments)      thins blood       Review of Systems   All systems were reviewed and negative except for: Above fever    Objective   Objective     Vitals:   Temp:  [99.5 °F (37.5 °C)] 99.5 °F (37.5 °C)  Heart Rate:  [86] 86  Resp:  [18] 18  BP: (156)/(59) 156/59    Physical Exam    Constitutional: Awake, alert, no acute distress   Eyes: Pupils equal, sclerae anicteric, no conjunctival injection   HENT: NCAT, mucous membranes moist   Neck: Supple, no thyromegaly, no lymphadenopathy, trachea midline   Respiratory: Mild crackles bilaterally   Cardiovascular: RRR, no murmurs, rubs, or gallops, palpable pedal pulses bilaterally   Gastrointestinal: Positive bowel sounds, soft, nontender, nondistended   Musculoskeletal: No bilateral ankle edema, no clubbing or cyanosis to extremities   Psychiatric: Appropriate affect, cooperative   Neurologic: Oriented x 3, strength symmetric in all extremities, Cranial Nerves grossly intact to confrontation, speech clear   Skin: No rashes     Result Review    Result Review:  I have personally reviewed the results from the time of this admission to 7/20/2024 02:32 EDT and agree with these findings:  [x]  Laboratory  []  Microbiology  [x]  Radiology  []  EKG/Telemetry   []  Cardiology/Vascular   []  Pathology  []  Old records  []  Other:      Assessment & Plan   Assessment / Plan     Assessment/Plan:     Assessment    Acute proximal respiratory distress   community-acquired pneumonia  Sepsis  New onset CHF   essential hypertension  Type II demand ischemia in setting CKD stage IV  CKD stage IV  Hyperlipidemia    Plan  Admit to [facility]  Telemetry  Monitor vitals  CBC BMP  Troponin trend  Blood cultures  Chest x-ray reviewed  DuoNebs as needed  Echo ordered  Lasix  IV Rocephin and azithromycin  If troponin continues to raise will start heparin  Cardiology consulted    VTE Prophylaxis:  Mechanical VTE prophylaxis orders are present.        CODE STATUS:    Code Status (Patient has no pulse and is  not breathing): CPR (Attempt to Resuscitate)  Medical Interventions (Patient has pulse or is breathing): Full Support      Admission Status:  I believe this patient meets inpatient status.    Electronically signed by Timi Veliz MD, 07/20/24, 2:32 AM EDT.

## 2024-07-21 LAB
ALBUMIN SERPL-MCNC: 3 G/DL (ref 3.5–5.2)
ALBUMIN/GLOB SERPL: 1.1 G/DL
ALP SERPL-CCNC: 76 U/L (ref 39–117)
ALT SERPL W P-5'-P-CCNC: 8 U/L (ref 1–41)
ANION GAP SERPL CALCULATED.3IONS-SCNC: 11.5 MMOL/L (ref 5–15)
AST SERPL-CCNC: 15 U/L (ref 1–40)
BASOPHILS # BLD AUTO: 0.07 10*3/MM3 (ref 0–0.2)
BASOPHILS NFR BLD AUTO: 0.4 % (ref 0–1.5)
BILIRUB SERPL-MCNC: 0.2 MG/DL (ref 0–1.2)
BUN SERPL-MCNC: 45 MG/DL (ref 8–23)
BUN/CREAT SERPL: 10.5 (ref 7–25)
CALCIUM SPEC-SCNC: 8.5 MG/DL (ref 8.6–10.5)
CHLORIDE SERPL-SCNC: 104 MMOL/L (ref 98–107)
CO2 SERPL-SCNC: 22.5 MMOL/L (ref 22–29)
CREAT SERPL-MCNC: 4.28 MG/DL (ref 0.76–1.27)
DEPRECATED RDW RBC AUTO: 46.5 FL (ref 37–54)
EGFRCR SERPLBLD CKD-EPI 2021: 13.1 ML/MIN/1.73
EOSINOPHIL # BLD AUTO: 0 10*3/MM3 (ref 0–0.4)
EOSINOPHIL NFR BLD AUTO: 0 % (ref 0.3–6.2)
ERYTHROCYTE [DISTWIDTH] IN BLOOD BY AUTOMATED COUNT: 13.4 % (ref 12.3–15.4)
GLOBULIN UR ELPH-MCNC: 2.7 GM/DL
GLUCOSE SERPL-MCNC: 113 MG/DL (ref 65–99)
HCT VFR BLD AUTO: 26.2 % (ref 37.5–51)
HGB BLD-MCNC: 8.9 G/DL (ref 13–17.7)
IMM GRANULOCYTES # BLD AUTO: 0.12 10*3/MM3 (ref 0–0.05)
IMM GRANULOCYTES NFR BLD AUTO: 0.8 % (ref 0–0.5)
IRON 24H UR-MRATE: 13 MCG/DL (ref 59–158)
IRON SATN MFR SERPL: 7 % (ref 20–50)
LYMPHOCYTES # BLD AUTO: 0.37 10*3/MM3 (ref 0.7–3.1)
LYMPHOCYTES NFR BLD AUTO: 2.4 % (ref 19.6–45.3)
MAGNESIUM SERPL-MCNC: 1.7 MG/DL (ref 1.6–2.4)
MCH RBC QN AUTO: 32.1 PG (ref 26.6–33)
MCHC RBC AUTO-ENTMCNC: 34 G/DL (ref 31.5–35.7)
MCV RBC AUTO: 94.6 FL (ref 79–97)
MONOCYTES # BLD AUTO: 1.19 10*3/MM3 (ref 0.1–0.9)
MONOCYTES NFR BLD AUTO: 7.6 % (ref 5–12)
NEUTROPHILS NFR BLD AUTO: 13.86 10*3/MM3 (ref 1.7–7)
NEUTROPHILS NFR BLD AUTO: 88.8 % (ref 42.7–76)
NRBC BLD AUTO-RTO: 0 /100 WBC (ref 0–0.2)
PHOSPHATE SERPL-MCNC: 3.7 MG/DL (ref 2.5–4.5)
PLATELET # BLD AUTO: 235 10*3/MM3 (ref 140–450)
PMV BLD AUTO: 10.3 FL (ref 6–12)
POTASSIUM SERPL-SCNC: 4 MMOL/L (ref 3.5–5.2)
PROT SERPL-MCNC: 5.7 G/DL (ref 6–8.5)
RBC # BLD AUTO: 2.77 10*6/MM3 (ref 4.14–5.8)
SODIUM SERPL-SCNC: 138 MMOL/L (ref 136–145)
TIBC SERPL-MCNC: 188 MCG/DL (ref 298–536)
TRANSFERRIN SERPL-MCNC: 126 MG/DL (ref 200–360)
WBC NRBC COR # BLD AUTO: 15.61 10*3/MM3 (ref 3.4–10.8)

## 2024-07-21 PROCEDURE — 85025 COMPLETE CBC W/AUTO DIFF WBC: CPT | Performed by: INTERNAL MEDICINE

## 2024-07-21 PROCEDURE — 86334 IMMUNOFIX E-PHORESIS SERUM: CPT | Performed by: INTERNAL MEDICINE

## 2024-07-21 PROCEDURE — 80053 COMPREHEN METABOLIC PANEL: CPT | Performed by: INTERNAL MEDICINE

## 2024-07-21 PROCEDURE — 25010000002 CEFTRIAXONE PER 250 MG: Performed by: STUDENT IN AN ORGANIZED HEALTH CARE EDUCATION/TRAINING PROGRAM

## 2024-07-21 PROCEDURE — 99233 SBSQ HOSP IP/OBS HIGH 50: CPT | Performed by: INTERNAL MEDICINE

## 2024-07-21 PROCEDURE — 83540 ASSAY OF IRON: CPT | Performed by: INTERNAL MEDICINE

## 2024-07-21 PROCEDURE — 84466 ASSAY OF TRANSFERRIN: CPT | Performed by: INTERNAL MEDICINE

## 2024-07-21 PROCEDURE — 25010000002 FUROSEMIDE PER 20 MG: Performed by: STUDENT IN AN ORGANIZED HEALTH CARE EDUCATION/TRAINING PROGRAM

## 2024-07-21 PROCEDURE — 82784 ASSAY IGA/IGD/IGG/IGM EACH: CPT | Performed by: INTERNAL MEDICINE

## 2024-07-21 PROCEDURE — 25010000002 NA FERRIC GLUC CPLX PER 12.5 MG: Performed by: INTERNAL MEDICINE

## 2024-07-21 PROCEDURE — 94799 UNLISTED PULMONARY SVC/PX: CPT

## 2024-07-21 PROCEDURE — 83735 ASSAY OF MAGNESIUM: CPT | Performed by: INTERNAL MEDICINE

## 2024-07-21 PROCEDURE — 84100 ASSAY OF PHOSPHORUS: CPT | Performed by: INTERNAL MEDICINE

## 2024-07-21 PROCEDURE — 83521 IG LIGHT CHAINS FREE EACH: CPT | Performed by: INTERNAL MEDICINE

## 2024-07-21 RX ORDER — FUROSEMIDE 40 MG/1
40 TABLET ORAL
Status: DISCONTINUED | OUTPATIENT
Start: 2024-07-21 | End: 2024-07-22 | Stop reason: HOSPADM

## 2024-07-21 RX ADMIN — HYDRALAZINE HYDROCHLORIDE 25 MG: 25 TABLET ORAL at 20:39

## 2024-07-21 RX ADMIN — PANTOPRAZOLE SODIUM 40 MG: 40 TABLET, DELAYED RELEASE ORAL at 05:06

## 2024-07-21 RX ADMIN — HYDRALAZINE HYDROCHLORIDE 25 MG: 25 TABLET ORAL at 08:57

## 2024-07-21 RX ADMIN — SODIUM CHLORIDE 125 MG: 9 INJECTION, SOLUTION INTRAVENOUS at 12:48

## 2024-07-21 RX ADMIN — FUROSEMIDE 20 MG: 10 INJECTION, SOLUTION INTRAMUSCULAR; INTRAVENOUS at 03:33

## 2024-07-21 RX ADMIN — PRAVASTATIN SODIUM 40 MG: 20 TABLET ORAL at 20:39

## 2024-07-21 RX ADMIN — TAMSULOSIN HYDROCHLORIDE 0.4 MG: 0.4 CAPSULE ORAL at 08:56

## 2024-07-21 RX ADMIN — FUROSEMIDE 40 MG: 40 TABLET ORAL at 18:14

## 2024-07-21 RX ADMIN — CALCITRIOL CAPSULES 0.25 MCG 0.25 MCG: 0.25 CAPSULE ORAL at 08:56

## 2024-07-21 RX ADMIN — AMLODIPINE BESYLATE 10 MG: 10 TABLET ORAL at 08:56

## 2024-07-21 RX ADMIN — AZITHROMYCIN DIHYDRATE 250 MG: 250 TABLET ORAL at 08:56

## 2024-07-21 RX ADMIN — Medication 10 ML: at 20:39

## 2024-07-21 RX ADMIN — Medication 10 ML: at 08:56

## 2024-07-21 RX ADMIN — CEFTRIAXONE SODIUM 2000 MG: 2 INJECTION, POWDER, FOR SOLUTION INTRAMUSCULAR; INTRAVENOUS at 08:56

## 2024-07-21 RX ADMIN — HYDRALAZINE HYDROCHLORIDE 25 MG: 25 TABLET ORAL at 18:14

## 2024-07-21 NOTE — PROGRESS NOTES
"Nephrology progress note    Chon Alvarez MD     Current history: Patient is alert, family members at bedside, states shortness of breath is better, no chest pain no nausea or vomiting.    Current medication list:  amLODIPine, 10 mg, Oral, Daily  azithromycin, 250 mg, Oral, Q24H  calcitriol, 0.25 mcg, Oral, Daily  cefTRIAXone, 2,000 mg, Intravenous, Q24H  ferric gluconate, 125 mg, Intravenous, Daily  hydrALAZINE, 25 mg, Oral, TID  pantoprazole, 40 mg, Oral, Q AM  pravastatin, 40 mg, Oral, Nightly  sodium chloride, 10 mL, Intravenous, Q12H  tamsulosin, 0.4 mg, Oral, Daily         acetaminophen    aluminum-magnesium hydroxide-simethicone    senna-docusate sodium **AND** polyethylene glycol **AND** bisacodyl **AND** bisacodyl    hydrALAZINE    ipratropium-albuterol    nitroglycerin    ondansetron    ondansetron ODT    oxyCODONE    sodium chloride    sodium chloride    sodium chloride    traMADol     Physical Exam:  /52 (BP Location: Right arm, Patient Position: Lying)   Pulse 90   Temp 97.2 °F (36.2 °C) (Oral)   Resp 18   Ht 182.9 cm (72\")   Wt 66.9 kg (147 lb 7.8 oz)   SpO2 98%   BMI 20.00 kg/m²     Intake/Output Summary (Last 24 hours) at 7/21/2024 1437  Last data filed at 7/21/2024 0900  Gross per 24 hour   Intake --   Output 885 ml   Net -885 ml      General: Patient alert and oriented no acute distress  Cardiac: Regular rate and rhythm without a rub no S3 or S4  Lungs: Clear bilaterally no wheezes rhonchi or rales  Abdomen: Bowel sounds positive nontender soft no mass felt no apparent organomegaly noted  Extremities: No edema clubbing or cyanosis  Skin: No acute rashes noted  : Deferred  Neuro: Appears intact with motor and sensory grossly    Results from last 7 days   Lab Units 07/21/24  0547 07/19/24  2236   SODIUM mmol/L 138 136   POTASSIUM mmol/L 4.0 4.8   CHLORIDE mmol/L 104 103   CO2 mmol/L 22.5 22.1   BUN mg/dL 45* 41*   CREATININE mg/dL 4.28* 3.95*   CALCIUM mg/dL 8.5* 8.7   BILIRUBIN " mg/dL 0.2 0.3   ALK PHOS U/L 76 92   ALT (SGPT) U/L 8 10   AST (SGOT) U/L 15 16   GLUCOSE mg/dL 113* 123*       Estimated Creatinine Clearance: 12.6 mL/min (A) (by C-G formula based on SCr of 4.28 mg/dL (H)).    Results from last 7 days   Lab Units 07/21/24  0547 07/19/24  2236   MAGNESIUM mg/dL 1.7 1.8   PHOSPHORUS mg/dL 3.7  --              Results from last 7 days   Lab Units 07/21/24  0547 07/19/24  2236   WBC 10*3/mm3 15.61* 15.04*   HEMOGLOBIN g/dL 8.9* 9.7*   PLATELETS 10*3/mm3 235 278             Imaging Results (Last 24 Hours)       ** No results found for the last 24 hours. **             Assessment/plan:    1.  Chronic kidney disease with stage IV early stage V.  Patient in the past has refused dialysis options.  Creatinine a little bit worse although clinically is improved.  Appears to be off IV diuretic but will start some oral.  Patient questionable possible monoclonal gammopathy per his daughter although last kappa lambda ratio was 1.88 which is actually normal for his level of kidney function and a negative UPEP.  Reordered studies while here for now I would just avoid IV contrast nonsteroidals and other nephrotoxins.  Had long discussion with patient and family about dialysis down the road.  They are going to think about.     2.  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  Hypertension: Agree with treatment, would not use ACE inhibitor or angiotensin receptor blocker due to his significant renal insufficiency.     4.  Fevers chills shortness of breath: Patient with possible pneumonia on CT as well as increased WBC on antibiotics currently     5.  History of previous CVA in the past     6.  Anemia: Hemoglobin decreased, low iron stores.  Patient started on IV iron    7.  History of peripheral vascular disease              Chon Alvarez MD

## 2024-07-21 NOTE — SIGNIFICANT NOTE
07/21/24 1106   Plan   Plan CCM, RN met with patient and spouse to introduce self and assess possible discharge needs.  Patient resting with eyes closed.  Patient has good support from family when needed.  Spouse provides transportation as spouse provides all other IADL's.  No financial needs.  PCP and facesheet verified.  Plans are for patient to return home with no needs at this time.

## 2024-07-21 NOTE — PLAN OF CARE
Goal Outcome Evaluation:  Plan of Care Reviewed With: patient        Progress: improving  Outcome Evaluation: Pt on 2.5L O2, VSS, no complaints of pain.

## 2024-07-21 NOTE — PROGRESS NOTES
[facility]   Hospitalist Progress Note  Date: 2024  Patient Name: Huy Reddy  : 1940  MRN: [id number]  Date of admission: 2024      Subjective   Subjective     Chief Complaint: Fever and chest pain    Summary:   Huy Reddy is a 83 y.o. male with past medical history of hypertension, hyperlipidemia, CKD presents to the ED due to fever and chest pain.  Patient has been dealing with bodyaches and fever for the past day.  In addition patient denies any chest pain is worse with coughing.  Denies shortness of breath, abdominal pain, nausea, vomiting diarrhea, dysuria, headache or palpitations.  In the ED, patient's  vitals show temperature 99.5, blood pressure 156/59.  94% room air.  Labs show troponin 116 and repeat 150, BNP 16,000, sodium 136, creatinine 3.95, white blood cell 1504 hemoglobin 9.7.  Chest x-ray shows effusion.  Patient on IV antibiotics.  Patient admitted to floors for further management.   Patient seen by nephrology and cardiology.  Interval Followup:   Remains on 2 L with 98% saturation.  Does not use oxygen at home.  No more fever.  No more chest pain pain  Denies any shortness of breath  Patient wondering when he will be going home.    Review of Systems   All systems were reviewed and negative except for: Summary interval follow-up    Objective   Objective     Vitals:   Temp:  [97.2 °F (36.2 °C)-99.4 °F (37.4 °C)] 97.2 °F (36.2 °C)  Heart Rate:  [] 96  Resp:  [18] 18  BP: (141-158)/(51-71) 150/51  Flow (L/min):  [2.5] 2.5  Physical Exam    Constitutional: Awake, alert, no acute distress   Eyes: Pupils equal, sclerae anicteric, no conjunctival injection   HENT: NCAT, mucous membranes moist   Neck: Supple, no thyromegaly, no lymphadenopathy, trachea midline   Respiratory: Clear to auscultation bilaterally, nonlabored respirations    Cardiovascular: RRR, no murmurs, rubs, or gallops, palpable pedal pulses bilaterally   Gastrointestinal: Positive bowel sounds, soft,  nontender, nondistended   Musculoskeletal: No bilateral ankle edema, no clubbing or cyanosis to extremities   Psychiatric: Appropriate affect, cooperative   Neurologic: Oriented x 3, strength symmetric in all extremities, Cranial Nerves grossly intact to confrontation, speech clear   Skin: Malignant scalp lesions.    Result Review    Result Review:  I have personally reviewed the results for the past 24 hours and agree with these findings:  [x]  Laboratory  [x]  Microbiology  [x]  Radiology  [x]  EKG/Telemetry sinus rhythm 75, LAD, QT 0.45  []  Cardiology/Vascular   []  Pathology  []  Old records  []  Other:    Assessment & Plan   Assessment / Plan     Assessment:  Hypoxemia.  No home oxygen use  Multifocal community-acquired pneumonia left more than right.  Unspecified bacterial pathogen  Sepsis  Acute on chronic diastolic CHF.  EF of 55%  Small bilateral pleural effusions due to above.  Iron deficiency anemia.  S/p IV iron transfusion.  Anemia likely anemia of chronic kidney disease.  Stable  Chronic leukocytosis.  essential hypertension  Type II demand ischemia in setting CKD stage IV  CKD stage IV.  Worsening  Hyperlipidemia  History of CVA  Peripheral vascular disease  Hyperlipidemia LDL of 87     Plan  Continue supplemental oxygen to keep sats more than 90%.  IV Lasix DC'd as creatinine trending up.  Hold home lisinopril and HCTZ.  IV Rocephin and Zithromax.  CT of the chest noted and discussed with patient and family at bedside.  Pro-Judson and lactate negative.  2D echo noted and discussed with patient.  Discussed with cardiology.  Appreciate input.  Will monitor from periphery to call if there is exertional chest pain.  Discussed with nephrology.  Appreciate input.  Checking immunoelectrophoresis.  Check FOBT  Continue home Flomax, Norvasc and hydralazine.  Continue telemetry  DuoNebs as needed  PT OT.  Patient wants to go home from here  DC continuous pulse oximetry.  Discussed plan with RN.  Need 6-minute  walk test prior to discharge.  Likely discharge home in a.m.    VTE Prophylaxis:  Mechanical VTE prophylaxis orders are present.        CODE STATUS:   Code Status (Patient has no pulse and is not breathing): CPR (Attempt to Resuscitate)  Medical Interventions (Patient has pulse or is breathing): Full Support      Part of this note may be an electronic transcription/translation of spoken language to printed text using the Dragon Dictation System.     Electronically signed by Billy Mosqueda MD, 07/21/24, 4:19 PM EDT.

## 2024-07-21 NOTE — PLAN OF CARE
Goal Outcome Evaluation:   Patient alert and orientated, sitting up in bed. Did have BM missed getting stool sample. No other complaints at this time.

## 2024-07-22 VITALS
OXYGEN SATURATION: 96 % | BODY MASS INDEX: 19.98 KG/M2 | HEIGHT: 72 IN | RESPIRATION RATE: 18 BRPM | WEIGHT: 147.49 LBS | DIASTOLIC BLOOD PRESSURE: 54 MMHG | TEMPERATURE: 98.2 F | SYSTOLIC BLOOD PRESSURE: 150 MMHG | HEART RATE: 84 BPM

## 2024-07-22 PROBLEM — J18.9 PNEUMONIA, UNSPECIFIED ORGANISM: Status: ACTIVE | Noted: 2024-07-22

## 2024-07-22 PROBLEM — J18.9 PNA (PNEUMONIA): Status: RESOLVED | Noted: 2024-07-20 | Resolved: 2024-07-22

## 2024-07-22 PROBLEM — J18.9 PNEUMONIA, UNSPECIFIED ORGANISM: Status: RESOLVED | Noted: 2024-07-22 | Resolved: 2024-07-22

## 2024-07-22 LAB
ALBUMIN SERPL-MCNC: 2.8 G/DL (ref 3.5–5.2)
ANION GAP SERPL CALCULATED.3IONS-SCNC: 12.2 MMOL/L (ref 5–15)
BASOPHILS # BLD AUTO: 0.05 10*3/MM3 (ref 0–0.2)
BASOPHILS NFR BLD AUTO: 0.5 % (ref 0–1.5)
BUN SERPL-MCNC: 45 MG/DL (ref 8–23)
BUN/CREAT SERPL: 10.3 (ref 7–25)
CALCIUM SPEC-SCNC: 8.4 MG/DL (ref 8.6–10.5)
CHLORIDE SERPL-SCNC: 106 MMOL/L (ref 98–107)
CO2 SERPL-SCNC: 22.8 MMOL/L (ref 22–29)
CREAT SERPL-MCNC: 4.39 MG/DL (ref 0.76–1.27)
DEPRECATED RDW RBC AUTO: 46.4 FL (ref 37–54)
EGFRCR SERPLBLD CKD-EPI 2021: 12.7 ML/MIN/1.73
EOSINOPHIL # BLD AUTO: 0.12 10*3/MM3 (ref 0–0.4)
EOSINOPHIL NFR BLD AUTO: 1.1 % (ref 0.3–6.2)
ERYTHROCYTE [DISTWIDTH] IN BLOOD BY AUTOMATED COUNT: 13.4 % (ref 12.3–15.4)
GLUCOSE SERPL-MCNC: 92 MG/DL (ref 65–99)
HCT VFR BLD AUTO: 25.6 % (ref 37.5–51)
HGB BLD-MCNC: 8.3 G/DL (ref 13–17.7)
IMM GRANULOCYTES # BLD AUTO: 0.06 10*3/MM3 (ref 0–0.05)
IMM GRANULOCYTES NFR BLD AUTO: 0.6 % (ref 0–0.5)
LYMPHOCYTES # BLD AUTO: 0.76 10*3/MM3 (ref 0.7–3.1)
LYMPHOCYTES NFR BLD AUTO: 7 % (ref 19.6–45.3)
MAGNESIUM SERPL-MCNC: 1.7 MG/DL (ref 1.6–2.4)
MCH RBC QN AUTO: 30.7 PG (ref 26.6–33)
MCHC RBC AUTO-ENTMCNC: 32.4 G/DL (ref 31.5–35.7)
MCV RBC AUTO: 94.8 FL (ref 79–97)
MONOCYTES # BLD AUTO: 0.9 10*3/MM3 (ref 0.1–0.9)
MONOCYTES NFR BLD AUTO: 8.3 % (ref 5–12)
NEUTROPHILS NFR BLD AUTO: 8.9 10*3/MM3 (ref 1.7–7)
NEUTROPHILS NFR BLD AUTO: 82.5 % (ref 42.7–76)
NRBC BLD AUTO-RTO: 0 /100 WBC (ref 0–0.2)
PHOSPHATE SERPL-MCNC: 4.5 MG/DL (ref 2.5–4.5)
PLATELET # BLD AUTO: 230 10*3/MM3 (ref 140–450)
PMV BLD AUTO: 10.6 FL (ref 6–12)
POTASSIUM SERPL-SCNC: 3.9 MMOL/L (ref 3.5–5.2)
RBC # BLD AUTO: 2.7 10*6/MM3 (ref 4.14–5.8)
SODIUM SERPL-SCNC: 141 MMOL/L (ref 136–145)
WBC NRBC COR # BLD AUTO: 10.79 10*3/MM3 (ref 3.4–10.8)

## 2024-07-22 PROCEDURE — 99239 HOSP IP/OBS DSCHRG MGMT >30: CPT | Performed by: INTERNAL MEDICINE

## 2024-07-22 PROCEDURE — 94761 N-INVAS EAR/PLS OXIMETRY MLT: CPT

## 2024-07-22 PROCEDURE — 94799 UNLISTED PULMONARY SVC/PX: CPT

## 2024-07-22 PROCEDURE — 94618 PULMONARY STRESS TESTING: CPT

## 2024-07-22 PROCEDURE — 83735 ASSAY OF MAGNESIUM: CPT | Performed by: INTERNAL MEDICINE

## 2024-07-22 PROCEDURE — 97161 PT EVAL LOW COMPLEX 20 MIN: CPT

## 2024-07-22 PROCEDURE — 85025 COMPLETE CBC W/AUTO DIFF WBC: CPT | Performed by: INTERNAL MEDICINE

## 2024-07-22 PROCEDURE — 80069 RENAL FUNCTION PANEL: CPT | Performed by: INTERNAL MEDICINE

## 2024-07-22 PROCEDURE — 25010000002 NA FERRIC GLUC CPLX PER 12.5 MG: Performed by: INTERNAL MEDICINE

## 2024-07-22 PROCEDURE — 25010000002 CEFTRIAXONE PER 250 MG: Performed by: STUDENT IN AN ORGANIZED HEALTH CARE EDUCATION/TRAINING PROGRAM

## 2024-07-22 PROCEDURE — 97165 OT EVAL LOW COMPLEX 30 MIN: CPT

## 2024-07-22 RX ORDER — FUROSEMIDE 40 MG/1
40 TABLET ORAL DAILY
Qty: 30 TABLET | Refills: 11 | Status: SHIPPED | OUTPATIENT
Start: 2024-07-22 | End: 2025-07-22

## 2024-07-22 RX ORDER — AZITHROMYCIN 250 MG/1
TABLET, FILM COATED ORAL
Qty: 4 TABLET | Refills: 0 | Status: SHIPPED | OUTPATIENT
Start: 2024-07-23

## 2024-07-22 RX ORDER — CARVEDILOL 3.12 MG/1
3.12 TABLET ORAL 2 TIMES DAILY WITH MEALS
Status: DISCONTINUED | OUTPATIENT
Start: 2024-07-22 | End: 2024-07-22 | Stop reason: HOSPADM

## 2024-07-22 RX ORDER — CARVEDILOL 3.12 MG/1
3.12 TABLET ORAL 2 TIMES DAILY WITH MEALS
Qty: 60 TABLET | Refills: 0 | Status: SHIPPED | OUTPATIENT
Start: 2024-07-22 | End: 2024-08-21

## 2024-07-22 RX ORDER — CALCITRIOL 0.25 UG/1
0.25 CAPSULE, LIQUID FILLED ORAL DAILY
Qty: 30 CAPSULE | Refills: 0 | Status: SHIPPED | OUTPATIENT
Start: 2024-07-23 | End: 2024-08-22

## 2024-07-22 RX ADMIN — AMLODIPINE BESYLATE 10 MG: 10 TABLET ORAL at 09:37

## 2024-07-22 RX ADMIN — FUROSEMIDE 40 MG: 40 TABLET ORAL at 09:37

## 2024-07-22 RX ADMIN — SODIUM CHLORIDE 125 MG: 9 INJECTION, SOLUTION INTRAVENOUS at 10:57

## 2024-07-22 RX ADMIN — Medication 10 ML: at 09:38

## 2024-07-22 RX ADMIN — HYDRALAZINE HYDROCHLORIDE 25 MG: 25 TABLET ORAL at 09:36

## 2024-07-22 RX ADMIN — CALCITRIOL CAPSULES 0.25 MCG 0.25 MCG: 0.25 CAPSULE ORAL at 09:37

## 2024-07-22 RX ADMIN — CEFTRIAXONE SODIUM 2000 MG: 2 INJECTION, POWDER, FOR SOLUTION INTRAMUSCULAR; INTRAVENOUS at 09:37

## 2024-07-22 RX ADMIN — AZITHROMYCIN DIHYDRATE 250 MG: 250 TABLET ORAL at 09:37

## 2024-07-22 RX ADMIN — TAMSULOSIN HYDROCHLORIDE 0.4 MG: 0.4 CAPSULE ORAL at 09:36

## 2024-07-22 RX ADMIN — PANTOPRAZOLE SODIUM 40 MG: 40 TABLET, DELAYED RELEASE ORAL at 05:37

## 2024-07-22 NOTE — PLAN OF CARE
Goal Outcome Evaluation:  Plan of Care Reviewed With: patient, spouse        Progress: no change  Outcome Evaluation: Patient does not present with any significant decline in function and does not require inpatient occupational therapy, therefore they will be discharged from services at this time. He is supervision or better for all BADLs, functional transfers, and mobility without a device. It is recommended he discharge home when medically able to do so. Patient is aware and agreeable with treatment plan at this time.      Anticipated Discharge Disposition (OT): home

## 2024-07-22 NOTE — DISCHARGE INSTR - APPOINTMENTS
DOCTOR CARMICHAEL APPT: IS ON 8/5/24 AT 3:00 PM    Nephrology appt. [phone] 07/23/2024 12:15 Sol Cano

## 2024-07-22 NOTE — THERAPY EVALUATION
Patient Name: Huy Reddy  : 1940    MRN: [id number]                              Today's Date: 2024       Admit Date: 2024    Visit Dx:     ICD-10-CM ICD-9-CM   1. Pneumonia of left lower lobe due to infectious organism  J18.9 486   2. Pleural effusion on left  J90 511.9   3. Leukocytosis, unspecified type  D72.829 288.60   4. Elevated troponin  R79.89 790.6   5. Sepsis, due to unspecified organism, unspecified whether acute organ dysfunction present  A41.9 038.9     995.91   6. Difficulty in walking  R26.2 719.7   7. Decreased activities of daily living (ADL)  Z78.9 V49.89     Patient Active Problem List   Diagnosis    Acute kidney injury superimposed on chronic kidney disease    Hypertension    Hyperlipidemia    Hypomagnesemia    Hypokalemia    Hypocalcemia    Metabolic acidosis    Normocytic anemia    Severe malnutrition    PNA (pneumonia)     Past Medical History:   Diagnosis Date    Aortic aneurysm     Chronic renal failure     Hyperlipidemia     Hypertension     Monoclonal gammopathy     Peripheral vascular disease     Renal disorder     Skin cancer     TIA (transient ischemic attack)     Vitamin D deficiency      Past Surgical History:   Procedure Laterality Date    TONSILLECTOMY        General Information       Row Name 24 1433          OT Time and Intention    Document Type evaluation  -LF     Mode of Treatment individual therapy;occupational therapy  -LF       Row Name 24 1433          General Information    Patient Profile Reviewed yes  -LF     Prior Level of Function --  (I) with ADLs, ambulated w/o a device, has a step over tub where he stands to shower but has a shower chair if needed, elevated commode, stands to shower, stands to groom, drives, and no home O2.  -LF     Existing Precautions/Restrictions fall  -LF     Barriers to Rehab none identified  -LF       Row Name 24 1433          Occupational Profile    Reason for Services/Referral (Occupational Profile)  Patient is an 83 year old male admitted to [facility] for fever and chest pain on July 20th, 2024. Occupational therapy consulted due to recent decline in ADLs/functional transfers. No previous occupational therapy services for current condition.  -       Row Name 07/22/24 1433          Living Environment    People in Home spouse  -[facility] Name 07/22/24 1433          Home Main Entrance    Number of Stairs, Main Entrance three  -     Stair Railings, Main Entrance railings on both sides of stairs  -[facility] Name 07/22/24 1433          Cognition    Orientation Status (Cognition) oriented x 4  -[facility] Name 07/22/24 1433          Safety Issues, Functional Mobility    Impairments Affecting Function (Mobility) other (see comments)  N/A  -               User Key  (r) = Recorded By, (t) = Taken By, (c) = Cosigned By      Initials Name Provider Type     Jossie Kennedy OT Occupational Therapist                     Mobility/ADL's       [facility] Name 07/22/24 1436          Bed Mobility    Bed Mobility supine-sit;sit-supine  -     Supine-Sit Thicket (Bed Mobility) independent  -     Sit-Supine [place] (Bed Mobility) independent  -LF       Row Name 07/22/24 1436          Transfers    Transfers sit-stand transfer;stand-sit transfer  -LF       Row Name 07/22/24 1436          Sit-Stand Transfer    Sit-Stand Thicket (Transfers) supervision  -LF       Row Name 07/22/24 1436          Stand-Sit Transfer    Stand-Sit [place] (Transfers) supervision  -LF       Row Name 07/22/24 1436          Functional Mobility    Functional Mobility- Ind. Level supervision required  -LF       Row Name 07/22/24 1436          Activities of Daily Living    BADL Assessment/Intervention bathing;upper body dressing;lower body dressing;grooming;feeding;toileting  -[facility] Name 07/22/24 1436          Bathing Assessment/Intervention    [place] Level (Bathing) bathing skills;upper body;lower  body;independent  -LF       Row Name 07/22/24 1436          Upper Body Dressing Assessment/Training    [place] Level (Upper Body Dressing) upper body dressing skills;independent  -LF       Row Name 07/22/24 1436          Lower Body Dressing Assessment/Training    [place] Level (Lower Body Dressing) lower body dressing skills;independent  -LF       Row Name 07/22/24 1436          Grooming Assessment/Training    [place] Level (Grooming) grooming skills;independent  -LF       Row Name 07/22/24 1436          Self-Feeding Assessment/Training    [place] Level (Feeding) feeding skills;independent  -LF       Row Name 07/22/24 1436          Toileting Assessment/Training    [place] Level (Toileting) independent  -               User Key  (r) = Recorded By, (t) = Taken By, (c) = Cosigned By      Initials Name Provider Type     Jossie Kennedy OT Occupational Therapist                   Obj/Interventions       Row Name 07/22/24 1436          Sensory Assessment (Somatosensory)    Sensory Assessment (Somatosensory) UE sensation intact  -LF       Row Name 07/22/24 1436          Vision Assessment/Intervention    Visual Impairment/Limitations WFL  -LF       Row Name 07/22/24 1436          Range of Motion Comprehensive    General Range of Motion bilateral upper extremity ROM WFL  -LF       Row Name 07/22/24 1436          Strength Comprehensive (MMT)    Comment, General Manual Muscle Testing (MMT) Assessment 4+/5 BUEs  -LF       Row Name 07/22/24 1436          Motor Skills    Motor Skills coordination;functional endurance  -LF     Coordination bilateral;upper extremity;WFL  -     Functional Endurance Fair/Fair+  -LF       Row Name 07/22/24 1436          Balance    Balance Assessment sitting dynamic balance;standing dynamic balance  -LF     Dynamic Sitting Balance independent  -LF     Position, Sitting Balance unsupported;sitting edge of bed  -LF     Dynamic Standing Balance supervision  -LF      Position/Device Used, Standing Balance unsupported  -               User Key  (r) = Recorded By, (t) = Taken By, (c) = Cosigned By      Initials Name Provider Type     Jossie Kennedy, [facility] Occupational Therapist                   Goals/Plan    No documentation.                  Clinical Impression       Row Name 07/22/24 1437          Pain Assessment    Additional Documentation Pain Scale: FACES Pre/Post-Treatment (Group)  -LF       Row Name 07/22/24 1437          Pain Scale: FACES Pre/Post-Treatment    Pain: FACES Scale, Pretreatment 0-->no hurt  -     Posttreatment Pain Rating 0-->no hurt  -LF       Row Name 07/22/24 1437          Plan of Care Review    Plan of Care Reviewed With patient;spouse  -     Progress no change  -     Outcome Evaluation Patient does not present with any significant decline in function and does not require inpatient occupational therapy, therefore they will be discharged from services at this time. He is supervision or better for all BADLs, functional transfers, and mobility without a device. It is recommended he discharge home when medically able to do so. Patient is aware and agreeable with treatment plan at this time.  -LF       Row Name 07/22/24 1437          Therapy Assessment/Plan (OT)    Patient/Family Therapy Goal Statement (OT) None reported  -     Rehab Potential (OT) other (see comments)  N/A  -     Criteria for Skilled Therapeutic Interventions Met (OT) no problems identified which require skilled intervention  -     Therapy Frequency (OT) evaluation only  -LF       Row Name 07/22/24 1437          Therapy Plan Review/Discharge Plan (OT)    Anticipated Discharge Disposition (OT) home  -LF       Row Name 07/22/24 1437          Vital Signs    O2 Delivery Pre Treatment room air  -     O2 Delivery Intra Treatment room air  -     O2 Delivery Post Treatment room air  -LF       Row Name 07/22/24 1437          Positioning and Restraints    Pre-Treatment Position in  bed  -LF     Post Treatment Position bed  -LF     In Bed fowlers;call light within reach;encouraged to call for assist;with family/caregiver  -LF               User Key  (r) = Recorded By, (t) = Taken By, (c) = Cosigned By      Initials Name Provider Type    LF Jossie Kennedy, OT Occupational Therapist                   Outcome Measures       Row Name 07/22/24 1438          How much help from another is currently needed...    Putting on and taking off regular lower body clothing? 3  -LF     Bathing (including washing, rinsing, and drying) 4  -LF     Toileting (which includes using toilet bed pan or urinal) 3  -LF     Putting on and taking off regular upper body clothing 4  -LF     Taking care of personal grooming (such as brushing teeth) 4  -LF     Eating meals 4  -LF     AM-PAC 6 Clicks Score (OT) 22  -LF       Row Name 07/22/24 1400          How much help from another person do you currently need...    Turning from your back to your side while in flat bed without using bedrails? 4  -DP (r) TL (t) DP (c)     Moving from lying on back to sitting on the side of a flat bed without bedrails? 4  -DP (r) TL (t) DP (c)     Moving to and from a bed to a chair (including a wheelchair)? 4  -DP (r) TL (t) DP (c)     Standing up from a chair using your arms (e.g., wheelchair, bedside chair)? 4  -DP (r) TL (t) DP (c)     Climbing 3-5 steps with a railing? 4  -DP (r) TL (t) DP (c)     To walk in hospital room? 4  -DP (r) TL (t) DP (c)     AM-PAC 6 Clicks Score (PT) 24  -DP (r) TL (t)     Highest Level of Mobility Goal 8 --> Walked 250 feet or more  -DP (r) TL (t)       Row Name 07/22/24 1438 07/22/24 1400       Functional Assessment    Outcome Measure Options AM-PAC 6 Clicks Daily Activity (OT);Optimal Instrument  -LF AM-PAC 6 Clicks Basic Mobility (PT)  -DP (r) TL (t) DP (c)      Row Name 07/22/24 1438          Optimal Instrument    Optimal Instrument Optimal - 3  -LF     Bending/Stooping 2  -LF     Standing 2  -LF      Reaching 1  -LF     From the list, choose the 3 activities you would most like to be able to do without any difficulty Bending/stooping;Standing;Reaching  -LF     Total Score Optimal - 3 5  -LF               User Key  (r) = Recorded By, (t) = Taken By, (c) = Cosigned By      Initials Name Provider Type     Jossie Kennedy, OT Occupational Therapist    Charlie Coburn, PT Physical Therapist    TL Suzy Aguero, PT Student PT Student                    Occupational Therapy Education       Title: PT OT SLP Therapies (In Progress)       Topic: Occupational Therapy (Done)       Point: ADL training (Done)       Description:   Instruct learner(s) on proper safety adaptation and remediation techniques during self care or transfers.   Instruct in proper use of assistive devices.                  Learning Progress Summary             Patient Acceptance, E,TB, VU by  at 7/22/2024 1439   Significant Other Acceptance, E,TB, VU by LF at 7/22/2024 1439                         Point: Precautions (Done)       Description:   Instruct learner(s) on prescribed precautions during self-care and functional transfers.                  Learning Progress Summary             Patient Acceptance, E,TB, VU by  at 7/22/2024 1439   Significant Other Acceptance, E,TB, VU by LF at 7/22/2024 1439                         Point: Body mechanics (Done)       Description:   Instruct learner(s) on proper positioning and spine alignment during self-care, functional mobility activities and/or exercises.                  Learning Progress Summary             Patient Acceptance, E,TB, VU by  at 7/22/2024 1439   Significant Other Acceptance, E,TB, VU by  at 7/22/2024 1439                                         User Key       Initials Effective Dates Name Provider Type [facility] 06/16/21 -  Jossie Kennedy OT Occupational Therapist OT                  OT Recommendation and Plan  Therapy Frequency (OT): evaluation only  Plan of Care  Review  Plan of Care Reviewed With: patient, spouse  Progress: no change  Outcome Evaluation: Patient does not present with any significant decline in function and does not require inpatient occupational therapy, therefore they will be discharged from services at this time. He is supervision or better for all BADLs, functional transfers, and mobility without a device. It is recommended he discharge home when medically able to do so. Patient is aware and agreeable with treatment plan at this time.     Time Calculation:   Evaluation Complexity (OT)  Review Occupational Profile/Medical/Therapy History Complexity: brief/low complexity  Assessment, Occupational Performance/Identification of Deficit Complexity: 1-3 performance deficits  Clinical Decision Making Complexity (OT): problem focused assessment/low complexity  Overall Complexity of Evaluation (OT): low complexity     Time Calculation- OT       Row Name 07/22/24 1439             Time Calculation- OT    OT Received On 07/22/24  -LF         Untimed Charges    OT Eval/Re-eval Minutes 33  -LF         Total Minutes    Untimed Charges Total Minutes 33  -LF       Total Minutes 33  -LF                User Key  (r) = Recorded By, (t) = Taken By, (c) = Cosigned By      Initials Name Provider Type    LF Jossie Kennedy OT Occupational Therapist                  Therapy Charges for Today       Code Description Service Date Service Provider Modifiers Qty    [id number] HC OT EVAL LOW COMPLEXITY 3 7/22/2024 Jossie Kennedy OT GO 1                 Jossie Kennedy OT  7/22/2024

## 2024-07-22 NOTE — PROGRESS NOTES
[facility]     Nephrology Progress Note      Patient Name: Huy Reddy  : 1940  MRN: [id number]  Primary Care Physician:  Allison Villafana MD  Date of admission: 2024    Subjective   Subjective     Interval History:  Patient Reports feeling much better.  Hoping to go home today.  Has follow-up in the office tomorrow.  Now interested in dialysis.    Review of Systems   All systems were reviewed and negative except for: What is mentioned above.    Objective   Objective     Vitals:   Temp:  [98.1 °F (36.7 °C)-99 °F (37.2 °C)] 98.2 °F (36.8 °C)  Heart Rate:  [81-97] 84  Resp:  [18] 18  BP: (139-153)/(53-60) 150/54  Flow (L/min):  [2.5] 2.5  Physical Exam:   Constitutional: Awake, alert, no distress, pale.   Eyes: sclerae anicteric, no conjunctival injection   HENT: mucous membranes moist   Neck: Supple, no thyromegaly, no lymphadenopathy, trachea midline, No JVD   Respiratory: Clear to auscultation bilaterally, nonlabored respirations    Cardiovascular: RRR, no murmurs, rubs, or gallops.   Gastrointestinal: Positive bowel sounds, soft, nontender, nondistended   Musculoskeletal: No edema, no clubbing or cyanosis   Psychiatric: Appropriate affect, cooperative   Neurologic: Oriented x 3, moving all extremities, Cranial Nerves grossly intact, speech clear   Skin: warm and dry, no rashes     Result Review    Result Reviewed:  I have personally reviewed the results from the time of this admission to 2024 16:18 EDT and agree with these findings:  [x]  Laboratory  []  Microbiology  [x]  Radiology  []  EKG/Telemetry   []  Cardiology/Vascular   []  Pathology  [x]  Old records  []  Other:        Lab 24  0443 24  0547 24  2236   SODIUM 141 138 136   POTASSIUM 3.9 4.0 4.8   CHLORIDE 106 104 103   CO2 22.8 22.5 22.1   BUN 45* 45* 41*   CREATININE 4.39* 4.28* 3.95*   GLUCOSE 92 113* 123*   EGFR 12.7* 13.1* 14.5*   ANION GAP 12.2 11.5 10.9   MAGNESIUM 1.7 1.7 1.8   PHOSPHORUS 4.5 3.7  --             Most notable findings include: Labs reviewed.    Assessment & Plan   Assessment / Plan       Active Hospital Problems:  There are no active hospital problems to display for this patient.      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  Had previous possible monoclonal colopathy but the ratio was within normal.  Has follow-up tomorrow in the office as he is now interested in pursuing dialysis when needed.  This will need to happen as outpatient soon.  - Secondary hyperparathyroidism, started on calcitriol, will adjust medication as outpatient.  - Hypertension, blood pressure is stable, avoid ACE inhibitors and ARB at this time.  - Pneumonia, treatment per primary.  Clinically feeling better.  - Anemia with low iron store, received IV iron.  May need more IV iron and possible ARMANDO as outpatient.  - Peripheral arterial disease.  Discussed with family.  Follow-up tomorrow in the office.  Discussed with nursing staff.  Please call with any questions.      Electronically signed by Babar Constantino MD, 7/22/2024, 16:18 EDT.

## 2024-07-22 NOTE — SIGNIFICANT NOTE
Took patient to room air this morning. His oxygen was 100% on 2lpm nasal cannula. Patient does not wear home oxygen. I discussed plans for walking oximetry test this morning with patient and after he eats breakfast we will get up and walk.

## 2024-07-22 NOTE — PLAN OF CARE
Goal Outcome Evaluation:         Patient alert and orientated. On room air. Passed walk test, walked about unit, no issues, no SOA. VSS. Family at bedside, expect to discharge today with family. No new complaints.

## 2024-07-22 NOTE — PLAN OF CARE
Goal Outcome Evaluation:           Progress: improving  Outcome Evaluation: VSS. No complaints of pain. Rested well throughout the night.

## 2024-07-22 NOTE — PROGRESS NOTES
I called and spoke with the patient about their recent clinic visit results. I answered any questions they had.      Dr. Ernst Wayne Walking Oximetry Progress Note      Patient Name:  Huy Reddy  YOB: 1940  Date of Procedure: 07/22/24              ROOM AIR BASELINE   SpO2%     97%   Heart Rate   112     EXERCISE ON ROOM AIR SpO2% EXERCISE ON O2 LPM SpO2%   1 MINUTE 98% 1 MINUTE     2 MINUTES 98% 2 MINUTES     3 MINUTES 97% 3 MINUTES     4 MINUTES 97% 4 MINUTES     5 MINUTES 97% 5 MINUTES     6 MINUTES 97% 6 MINUTES                Time to Recovery  0   SpO2% Post Exercise  98% on RA.    HR Post Exercise  102     Comments: Patient ambulated well on room air. Oxygen never fell below 97%. Patient does not qualify for home oxygen.           Electronically signed by Zoie Ford RRT, 07/22/24, 9:00 AM EDT.

## 2024-07-22 NOTE — THERAPY EVALUATION
Acute Care - Physical Therapy Initial Evaluation   [facility]     Patient Name: Huy Reddy  : 1940  MRN: [id number]  Today's Date: 2024      Visit Dx:     ICD-10-CM ICD-9-CM   1. Pneumonia of left lower lobe due to infectious organism  J18.9 486   2. Pleural effusion on left  J90 511.9   3. Leukocytosis, unspecified type  D72.829 288.60   4. Elevated troponin  R79.89 790.6   5. Sepsis, due to unspecified organism, unspecified whether acute organ dysfunction present  A41.9 038.9     995.91   6. Difficulty in walking  R26.2 719.7     Patient Active Problem List   Diagnosis    Acute kidney injury superimposed on chronic kidney disease    Hypertension    Hyperlipidemia    Hypomagnesemia    Hypokalemia    Hypocalcemia    Metabolic acidosis    Normocytic anemia    Severe malnutrition    PNA (pneumonia)     Past Medical History:   Diagnosis Date    Aortic aneurysm     Chronic renal failure     Hyperlipidemia     Hypertension     Monoclonal gammopathy     Peripheral vascular disease     Renal disorder     Skin cancer     TIA (transient ischemic attack)     Vitamin D deficiency      Past Surgical History:   Procedure Laterality Date    TONSILLECTOMY       PT Assessment (Last 12 Hours)       PT Evaluation and Treatment       Row Name 24 1415          Physical Therapy Time and Intention    Subjective Information no complaints;pain (P)   -TL     Document Type evaluation (P)   -TL     Mode of Treatment individual therapy;physical therapy (P)   -TL     Patient Effort good (P)   -TL     Symptoms Noted During/After Treatment fatigue (P)   -TL       Row Name 24 1415          General Information    Patient Profile Reviewed yes (P)   -TL     Patient Observations alert;cooperative;agree to therapy (P)   -TL     Prior Level of Function independent:;all household mobility;community mobility;gait;transfer;bed mobility;ADL's (P)   -TL     Equipment Currently Used at Home walker, rolling;cane, straight (P)   Pt  states that he has these at home, but never uses them.  -TL     Existing Precautions/Restrictions fall (P)   -TL       Row Name 07/22/24 1415          Living Environment    Current Living Arrangements home (P)   -TL     Home Accessibility stairs to enter home (P)   -TL     People in Home spouse (P)   -TL     Primary Care Provided by self (P)   -TL       Row Name 07/22/24 1415          Home Main Entrance    Number of Stairs, Main Entrance three (P)   -TL     Stair Railings, Main Entrance railings on both sides of stairs (P)   -TL       Row Name 07/22/24 1415          Cognition    Orientation Status (Cognition) oriented x 4 (P)   -TL       Row Name 07/22/24 1415          Range of Motion (ROM)    Range of Motion bilateral lower extremities;ROM is WFL (P)   -TL       Row Name 07/22/24 1415          Strength (Manual Muscle Testing)    Strength (Manual Muscle Testing) bilateral lower extremities (P)   4-/5  -TL       Row Name 07/22/24 1415          Bed Mobility    Bed Mobility supine-sit-supine (P)   -TL     Supine-Sit-Supine Branch (Bed Mobility) supervision (P)   -TL       Row Name 07/22/24 1415          Transfers    Transfers sit-stand transfer;stand-sit transfer (P)   -TL       Row Name 07/22/24 1415          Sit-Stand Transfer    Sit-Stand Branch (Transfers) supervision (P)   -TL     Assistive Device (Sit-Stand Transfers) other (see comments) (P)   no AD  -TL       Row Name 07/22/24 1415          Stand-Sit Transfer    Stand-Sit Branch (Transfers) supervision (P)   -TL     Assistive Device (Stand-Sit Transfers) other (see comments) (P)   no AD  -TL       Row Name 07/22/24 1415          Gait/Stairs (Locomotion)    Gait/Stairs Locomotion gait/ambulation independence (P)   -TL     Branch Level (Gait) contact guard (P)   -TL     Assistive Device (Gait) other (see comments) (P)   no AD  -TL     Patient was able to Ambulate yes (P)   -TL     Distance in Feet (Gait) 250 (P)   -TL     Pattern (Gait)  step-through (P)   -TL     Deviations/Abnormal Patterns (Gait) elinor decreased;gait speed decreased;stride length decreased;base of support, narrow (P)   -TL       Row Name 07/22/24 1415          Safety Issues, Functional Mobility    Impairments Affecting Function (Mobility) balance;endurance/activity tolerance (P)   -TL       Row Name 07/22/24 1415          Balance    Balance Assessment standing dynamic balance (P)   -TL     Dynamic Standing Balance contact guard (P)   -TL     Position/Device Used, Standing Balance unsupported;other (see comments) (P)   no AD  -TL       Row Name 07/22/24 1415          Plan of Care Review    Plan of Care Reviewed With patient;spouse (P)   -TL     Outcome Evaluation Pt presents with functional independence with bed mobility and transfer.  Pt demonstrated good LE strength and ROM but required CGA with ambulation.  Skilled PT is not recommended at this time due to the functional independence and safety awareness of the pt. (P)   -TL       Row Name 07/22/24 1415          Positioning and Restraints    Pre-Treatment Position in bed (P)   -TL     Post Treatment Position bed (P)   -TL     In Bed supine;call light within reach;encouraged to call for assist;exit alarm on;with family/caregiver (P)   -TL       Row Name 07/22/24 1415          Therapy Assessment/Plan (PT)    Criteria for Skilled Interventions Met (PT) no problems identified which require skilled intervention (P)   -TL     Therapy Frequency (PT) evaluation only (P)   -TL       Row Name 07/22/24 1415          Therapy Plan Review/Discharge Plan (PT)    Therapy Plan Review (PT) evaluation/treatment results reviewed (P)   -TL       Row Name 07/22/24 1415          Physical Therapy Goals    Problem Specific Goal Selection (PT) problem specific goal 1, PT (P)   -TL       Row Name 07/22/24 1415          Problem Specific Goal 1 (PT)    Problem Specific Goal 1 (PT) Pt able to complete eval. (P)   -TL     Time Frame (Problem Specific Goal  1, PT) 1 day (P)   -TL     Progress/Outcome (Problem Specific Goal 1, PT) goal met (P)   -TL               User Key  (r) = Recorded By, (t) = Taken By, (c) = Cosigned By      Initials Name Provider Type    TL Suzy Aguero PT Student PT Student                    Physical Therapy Education       Title: PT OT SLP Therapies (Not Started)       Topic: Physical Therapy (Not Started)       Point: Mobility training (Not Started)       Learner Progress:  Not documented in this visit.                                  PT Recommendation and Plan  Anticipated Discharge Disposition (PT): (P) home, home with home health  Therapy Frequency (PT): (P) evaluation only  Plan of Care Reviewed With: (P) patient, spouse  Outcome Evaluation: (P) Pt presents with functional independence with bed mobility and transfer.  Pt demonstrated good LE strength and ROM but required CGA with ambulation.  Skilled PT is not recommended at this time due to the functional independence and safety awareness of the pt.   Outcome Measures       Row Name 07/22/24 1400             How much help from another person do you currently need...    Turning from your back to your side while in flat bed without using bedrails? 4 (P)   -TL      Moving from lying on back to sitting on the side of a flat bed without bedrails? 4 (P)   -TL      Moving to and from a bed to a chair (including a wheelchair)? 4 (P)   -TL      Standing up from a chair using your arms (e.g., wheelchair, bedside chair)? 4 (P)   -TL      Climbing 3-5 steps with a railing? 4 (P)   -TL      To walk in hospital room? 4 (P)   -TL      AM-PAC 6 Clicks Score (PT) 24 (P)   -TL      Highest Level of Mobility Goal 8 --> Walked 250 feet or more (P)   -TL         Functional Assessment    Outcome Measure Options AM-PAC 6 Clicks Basic Mobility (PT) (P)   -TL                User Key  (r) = Recorded By, (t) = Taken By, (c) = Cosigned By      Initials Name Provider Type    Suzy Ch PT Student PT Student                      Time Calculation:    PT Charges       Row Name 07/22/24 1415             Time Calculation    PT Received On 07/22/24  -DP (r) TL (t) DP (c)         Untimed Charges    PT Eval/Re-eval Minutes 40  -DP (r) TL (t) DP (c)         Total Minutes    Untimed Charges Total Minutes 40  -DP (r) TL (t)       Total Minutes 40  -DP (r) TL (t)                User Key  (r) = Recorded By, (t) = Taken By, (c) = Cosigned By      Initials Name Provider Type    DP Charlie Nowak, PT Physical Therapist    TL Suzy Aguero, PT Student PT Student                  Therapy Charges for Today       Code Description Service Date Service Provider Modifiers Qty    04068962840 HC PT EVAL LOW COMPLEXITY 3 7/22/2024 Suzy Aguero, PT Student GP 1            PT G-Codes  Outcome Measure Options: (P) AM-PAC 6 Clicks Basic Mobility (PT)  AM-PAC 6 Clicks Score (PT): (P) 24    Suzy Aguero PT Student  7/22/2024

## 2024-07-22 NOTE — DISCHARGE SUMMARY
[facility]        HOSPITALIST  DISCHARGE SUMMARY    Patient Name: Huy Reddy  : 1940  MRN: [id number]    Date of Admission: 2024  Date of Discharge:  2024  Primary Care Physician: Allison Villafana MD    Consults       Date and Time Order Name Status Description    2024 10:26 AM Inpatient Nephrology Consult      2024  2:30 AM Inpatient Cardiology Consult      2024  1:23 AM Hospitalist (on-call MD unless specified)              Active and Resolved Hospital Problems:  Active Hospital Problems   No active problems to display.      Resolved Hospital Problems    Diagnosis POA   • **PNA (pneumonia) [J18.9] Yes   • Pneumonia, unspecified organism [J18.9] Yes     Hypoxemia.  No home oxygen use.  Resolved  Multifocal community-acquired pneumonia left more than right.  Unspecified bacterial pathogen  Sepsis.  Resolved  Acute on chronic diastolic CHF.  EF of 55%  Small bilateral pleural effusions due to above.  Iron deficiency anemia.  S/p IV iron transfusion.  Anemia likely anemia of chronic kidney disease.  Stable  Chronic leukocytosis.  Improved  essential hypertension  Type II demand ischemia in setting CKD stage IV  CKD stage IV.  Worsening  Hyperlipidemia  History of CVA  Peripheral vascular disease  Hyperlipidemia LDL of 87  Scalp skin cancer  Hospital Course     Hospital Course:  Huy Reddy is a 83 y.o. male with past medical history of hypertension, hyperlipidemia, CKD presents to the ED due to fever and chest pain.  Patient has been dealing with bodyaches and fever for the past day.  In addition patient denies any chest pain is worse with coughing.  Denies shortness of breath, abdominal pain, nausea, vomiting diarrhea, dysuria, headache or palpitations.  In the ED, patient's  vitals show temperature 99.5, blood pressure 156/59.  94% room air.  Labs show troponin 116 and repeat 150, BNP 16,000, sodium 136, creatinine 3.95, white blood cell 1504 hemoglobin 9.7.   Chest x-ray shows effusion.  Patient on IV antibiotics.  Patient admitted to floors for further management.   Patient seen by nephrology and cardiology.  IV diuretics changed to p.o.  No intervention as per cardiology.  Recommended aspirin but patient is allergic to it.  Started on Coreg.  Interval Followup:   Patient on room air now.  Walk 6-minute walk test  No more fever.  No more chest pain pain  Denies any shortness of breath  Wants to go home today.      DISCHARGE Follow Up Recommendations for labs and diagnostics: Follow with PCP, nephrology and cardiology.  Nephrology to follow-up on the results of blood work pending at the time of discharge obtained for kidney injury.      Day of Discharge     Vital Signs:  Temp:  [98.1 °F (36.7 °C)-99 °F (37.2 °C)] 98.2 °F (36.8 °C)  Heart Rate:  [81-97] 84  Resp:  [18] 18  BP: (139-153)/(53-60) 150/54  Flow (L/min):  [2.5] 2.5    Physical Exam:      Constitutional: Awake, alert, no acute distress              Eyes: Pupils equal, sclerae anicteric, no conjunctival injection              HENT: NCAT, mucous membranes moist              Neck: Supple, no thyromegaly, no lymphadenopathy, trachea midline              Respiratory: Clear to auscultation bilaterally, nonlabored respirations               Cardiovascular: RRR, no murmurs, rubs, or gallops, palpable pedal pulses bilaterally              Gastrointestinal: Positive bowel sounds, soft, nontender, nondistended              Musculoskeletal: No bilateral ankle edema, no clubbing or cyanosis to extremities              Psychiatric: Appropriate affect, cooperative              Neurologic: Oriented x 3, strength symmetric in all extremities, Cranial Nerves grossly intact to confrontation, speech clear              Skin: Malignant scalp lesions.  Discharge Details        Discharge Medications        New Medications        Instructions Start Date   azithromycin 250 MG tablet  Commonly known as: ZITHROMAX   Indications:  Pneumonia   Start Date: July 23, 2024     calcitriol 0.25 MCG capsule  Commonly known as: ROCALTROL   0.25 mcg, Oral, Daily   Start Date: July 23, 2024     carvedilol 3.125 MG tablet  Commonly known as: COREG   3.125 mg, Oral, 2 Times Daily With Meals      furosemide 40 MG tablet  Commonly known as: Lasix   40 mg, Oral, Daily             Continue These Medications        Instructions Start Date   amLODIPine 10 MG tablet  Commonly known as: NORVASC   10 mg, Oral, Daily      famotidine 20 MG tablet  Commonly known as: PEPCID   20 mg, Oral, Every Morning      hydrALAZINE 25 MG tablet  Commonly known as: APRESOLINE   25 mg, Oral, 3 Times Daily      Magnesium 250 MG tablet   250 mg, Oral, Every Morning      pravastatin 40 MG tablet  Commonly known as: PRAVACHOL   40 mg, Oral, Daily      tamsulosin 0.4 MG capsule 24 hr capsule  Commonly known as: FLOMAX   0.4 mg, Oral, Daily      traMADol 50 MG tablet  Commonly known as: ULTRAM   50 mg, Oral, Every 8 Hours PRN               Allergies   Allergen Reactions   • Aspirin Other (See Comments)      thins blood       Discharge Disposition:  Home or Self Care in private vehicle with family member    Diet:  Diet Instructions       Diet: Cardiac Diets; Healthy Heart (2-3 Na+); Thin (IDDSI 0)      Discharge Diet: Cardiac Diets    Cardiac Diet: Healthy Heart (2-3 Na+)    Fluid Consistency: Thin (IDDSI 0)            Discharge Activity:   Activity Instructions       Activity as Tolerated              CODE STATUS:  Code Status and Medical Interventions:   Ordered at: 07/20/24 0228     Code Status (Patient has no pulse and is not breathing):    CPR (Attempt to Resuscitate)     Medical Interventions (Patient has pulse or is breathing):    Full Support         No future appointments.    Additional Instructions for the Follow-ups that You Need to Schedule       Discharge Follow-up with PCP   As directed       Currently Documented PCP:    Allison Villafana MD    PCP Phone Number:     [phone]     Follow Up Details: 2 weeks        Discharge Follow-up with Specified Provider: Cardiologist; 2 Weeks   As directed      To: Cardiologist   Follow Up: 2 Weeks        Discharge Follow-up with Specified Provider: Dr. Mccrary; 1 Week   As directed      To: Dr. Mccrary   Follow Up: 1 Week   Follow Up Details: CKD 4                Pertinent  and/or Most Recent Results     PROCEDURES:   * Cannot find OR case *     LAB RESULTS:      Lab 07/22/24  0443 07/21/24  0547 07/20/24  0511 07/20/24  0149 07/19/24  2236   WBC 10.79 15.61*  --   --  15.04*   HEMOGLOBIN 8.3* 8.9*  --   --  9.7*   HEMATOCRIT 25.6* 26.2*  --   --  29.2*   PLATELETS 230 235  --   --  278   NEUTROS ABS 8.90* 13.86*  --   --  13.30*   IMMATURE GRANS (ABS) 0.06* 0.12*  --   --  0.08*   LYMPHS ABS 0.76 0.37*  --   --  0.50*   MONOS ABS 0.90 1.19*  --   --  1.08*   EOS ABS 0.12 0.00  --   --  0.01   MCV 94.8 94.6  --   --  94.8   PROCALCITONIN  --   --  0.25  --   --    LACTATE  --   --   --  1.2  --          Lab 07/22/24  0443 07/21/24  0547 07/20/24  0511 07/20/24  0020 07/19/24 2236   SODIUM 141 138  --   --  136   POTASSIUM 3.9 4.0  --   --  4.8   CHLORIDE 106 104  --   --  103   CO2 22.8 22.5  --   --  22.1   ANION GAP 12.2 11.5  --   --  10.9   BUN 45* 45*  --   --  41*   CREATININE 4.39* 4.28*  --   --  3.95*   EGFR 12.7* 13.1*  --   --  14.5*   GLUCOSE 92 113*  --   --  123*   CALCIUM 8.4* 8.5*  --   --  8.7   MAGNESIUM 1.7 1.7  --   --  1.8   PHOSPHORUS 4.5 3.7  --   --   --    HEMOGLOBIN A1C  --   --  5.10  --   --    TSH  --   --   --  1.530  --          Lab 07/22/24  0443 07/21/24  0547 07/19/24  2236   TOTAL PROTEIN  --  5.7* 6.1   ALBUMIN 2.8* 3.0* 3.7   GLOBULIN  --  2.7 2.4   ALT (SGPT)  --  8 10   AST (SGOT)  --  15 16   BILIRUBIN  --  0.2 0.3   ALK PHOS  --  76 92   LIPASE  --   --  55         Lab 07/20/24  0511 07/20/24  0020 07/19/24 2236   PROBNP  --   --  16,905.0*   HSTROP T 165* 150* 116*         Lab  07/20/24  0020   CHOLESTEROL 148   LDL CHOL 87   HDL CHOL 47   TRIGLYCERIDES 68         Lab 07/21/24  0547   IRON 13*   IRON SATURATION (TSAT) 7*   TIBC 188*   TRANSFERRIN 126*         Brief Urine Lab Results  (Last result in the past 365 days)        Color   Clarity   Blood   Leuk Est   Nitrite   Protein   CREAT   Urine HCG        01/08/24 1353   CLEAR   NEGATIVE   NEGATIVE   NEGATIVE                   Microbiology Results (last 10 days)       Procedure Component Value - Date/Time    S. Pneumo Ag Urine or CSF - Urine, Urine, Clean Catch [[id number]]  (Normal) Collected: 07/20/24 1632    Lab Status: Final result Specimen: Urine, Clean Catch Updated: 07/20/24 1657     Strep Pneumo Ag Negative    Legionella Antigen, Urine - Urine, Urine, Clean Catch [[id number]]  (Normal) Collected: 07/20/24 1632    Lab Status: Final result Specimen: Urine, Clean Catch Updated: 07/20/24 1657     LEGIONELLA ANTIGEN, URINE Negative    Blood Culture - Blood, Arm, Left [[id number]]  (Normal) Collected: 07/20/24 0159    Lab Status: Preliminary result Specimen: Blood from Arm, Left Updated: 07/22/24 0215     Blood Culture No growth at 2 days    Blood Culture - Blood, Arm, Right [[id number]]  (Normal) Collected: 07/20/24 0149    Lab Status: Preliminary result Specimen: Blood from Arm, Right Updated: 07/22/24 0200     Blood Culture No growth at 2 days    COVID-19, FLU A/B, RSV PCR 1 HR TAT - Swab, Nasopharynx [[id number]]  (Normal) Collected: 07/19/24 2234    Lab Status: Final result Specimen: Swab from Nasopharynx Updated: 07/19/24 2336     COVID19 Not Detected     Influenza A PCR Not Detected     Influenza B PCR Not Detected     RSV, PCR Not Detected    Narrative:      Fact sheet for providers: https://www.fda.gov/media/517777/download    Fact sheet for patients: https://www.fda.gov/media/375450/download    Test performed by PCR.            CT Chest Without Contrast Diagnostic    Result Date: 7/20/2024  Impression: 1.Patchy bilateral airspace  disease, left greater than right, concerning for multifocal infiltrates. 2.Small bilateral pleural effusions with bibasilar atelectasis. 3.Additional findings as detailed above. Electronically Signed: Francisco Arellano MD  7/20/2024 11:32 AM EDT  Workstation ID: [id number]    XR Chest 1 View    Result Date: 7/19/2024  Impression: Small left effusion with infiltrate or atelectasis at the left lung base. Electronically Signed: Madi Fox MD  7/19/2024 10:59 PM EDT  Workstation ID: [id number]             Results for orders placed during the hospital encounter of 07/20/24    Adult Transthoracic Echo Complete W/ Cont if Necessary Per Protocol    Interpretation Summary  •  Left ventricular systolic function is low normal. Left ventricular ejection fraction appears to be 51 - 55%.  •  Left ventricular wall thickness is consistent with borderline concentric hypertrophy.  •  Left ventricular diastolic function is consistent with (grade I) impaired relaxation and age.  •  Anterior fat pad and what looks like to be a trivial pericardial effusion noted.  Pleural effusion is also noted.  •  Mild to moderate aortic insufficiency.  No significant aortic stenosis with max/mean pressure gradient 15/9 mmHg.  •  Appears mild to moderate mitral regurgitation.      Imaging Results (All)       Procedure Component Value Units Date/Time    CT Chest Without Contrast Diagnostic [[id number]] Collected: 07/20/24 1127     Updated: 07/20/24 1134    Narrative:      CT CHEST WO CONTRAST DIAGNOSTIC    Date of Exam: 7/20/2024 10:53 AM EDT    Indication: Hypoxia.  Left lower lobe pneumonia.  CHF.    Comparison: Chest x-ray dated 7/19/2024    Technique: Axial CT images were obtained of the chest without contrast administration.  Reconstructed coronal and sagittal images were also obtained. Automated exposure control and iterative construction methods were used.      FINDINGS:    Thoracic inlet: Unremarkable.    Great vessels: Atherosclerotic plaque is  seen within the thoracic aorta and proximal arch vessels    Mediastinum/Erendira: No pathologically enlarged mediastinal lymph nodes are seen. The esophagus is unremarkable. The esophagus appears unremarkable.    Lung parenchyma/pleura: There are small bilateral pleural effusions with bibasilar atelectasis. There is also patchy airspace disease within both lungs, left greater than right, concerning for infiltrates. No suspicious pulmonary nodule is seen. No   pneumothorax is seen.    Trachea and airways: The trachea and central airways appear unremarkable.    Heart and pericardium: Coronary artery calcifications are present. Small pericardial effusion is seen.    Chest wall: No acute or suspicious osseous or soft tissue lesion is identified. There are degenerative changes within the spine.    Upper abdomen: No acute abnormality is identified within the visualized upper abdomen      Impression:      1.Patchy bilateral airspace disease, left greater than right, concerning for multifocal infiltrates.  2.Small bilateral pleural effusions with bibasilar atelectasis.  3.Additional findings as detailed above.        Electronically Signed: Francisco Arellano MD    7/20/2024 11:32 AM EDT    Workstation ID: [id number]    XR Chest 1 View [[id number]] Collected: 07/19/24 2259     Updated: 07/19/24 2301    Narrative:      XR CHEST 1 VW    Date of Exam: 7/19/2024 10:55 PM EDT    Indication: Chest Pain Triage Protocol    Comparison: 1/18/2023    Findings:  Cardiac and mediastinal contours are normal. There is atherosclerotic disease in the aorta. There is a small left pleural effusion with infiltrate or atelectasis at the left base. Right lung is clear. No pneumothorax.      Impression:      Small left effusion with infiltrate or atelectasis at the left lung base.      Electronically Signed: Madi Fox MD    7/19/2024 10:59 PM EDT    Workstation ID: [id number]             Labs Pending at Discharge:  Pending Labs       Order Current  Status    Immunofixation, Serum In process    Immunofixation, Urine - Urine, Clean Catch In process    Immunoglobulin Free LT Chains Blood In process    Blood Culture - Blood, Arm, Left Preliminary result    Blood Culture - Blood, Arm, Right Preliminary result                Time spent on Discharge including face to face service: 35 minutes  Part of this note may be an electronic transcription/translation of spoken language to printed text using the Dragon Dictation System.     TElectronically signed by Billy Mosqueda MD, 07/22/24, 2:54 PM EDT.

## 2024-07-22 NOTE — PLAN OF CARE
Goal Outcome Evaluation:  Plan of Care Reviewed With: (P) patient, spouse           Outcome Evaluation: (P) Pt presents with functional independence with bed mobility and transfer.  Pt demonstrated good LE strength and ROM but required CGA with ambulation.  Skilled PT is not recommended at this time due to the functional independence and safety awareness of the pt.      Anticipated Discharge Disposition (PT): (P) home, home with home health

## 2024-07-23 ENCOUNTER — READMISSION MANAGEMENT (OUTPATIENT)
Dept: CALL CENTER | Facility: HOSPITAL | Age: 84
End: 2024-07-23
Payer: MEDICARE

## 2024-07-23 LAB
IGA SERPL-MCNC: 267 MG/DL (ref 61–437)
IGG SERPL-MCNC: 513 MG/DL (ref 603–1613)
IGM SERPL-MCNC: 91 MG/DL (ref 15–143)
KAPPA LC FREE SER-MCNC: 80.5 MG/L (ref 3.3–19.4)
KAPPA LC FREE/LAMBDA FREE SER: 1.62 {RATIO} (ref 0.26–1.65)
LAMBDA LC FREE SERPL-MCNC: 49.7 MG/L (ref 5.7–26.3)
PROT PATTERN SERPL IFE-IMP: ABNORMAL

## 2024-07-23 NOTE — OUTREACH NOTE
Prep Survey      Flowsheet Row Responses   [facility] facility patient discharged from? [facility]   Is LACE score < 7 ? No   Eligibility Readm Mgmt   Discharge diagnosis PNA (pneumonia)   Does the patient have one of the following disease processes/diagnoses(primary or secondary)? Pneumonia   Does the patient have Home health ordered? No   Is there a DME ordered? No   Medication alerts for this patient see avs--po antibx   Prep survey completed? Yes            Karma PURCELL - Registered Nurse

## 2024-07-24 LAB — INTERPRETATION UR IFE-IMP: NORMAL

## 2024-07-25 LAB
BACTERIA SPEC AEROBE CULT: NORMAL
BACTERIA SPEC AEROBE CULT: NORMAL

## 2024-07-30 ENCOUNTER — READMISSION MANAGEMENT (OUTPATIENT)
Dept: CALL CENTER | Facility: HOSPITAL | Age: 84
End: 2024-07-30
Payer: MEDICARE

## 2024-07-30 NOTE — OUTREACH NOTE
"COPD/PN Week 1 Survey      Flowsheet Row Responses   [facility] patient discharged from? [facility]   Does the patient have one of the following disease processes/diagnoses(primary or secondary)? Pneumonia   Week 1 attempt successful? Yes   Call start time 0900   Call end time 0902   Discharge diagnosis PNA (pneumonia)   Is patient permission given to speak with other caregiver? Yes   List who call center can speak with Candida daughter   Person spoke with today (if not patient) and relationship Candida daughter   Meds reviewed with patient/caregiver? Yes   Is the patient having any side effects they believe may be caused by any medication additions or changes? No   Does the patient have all medications ordered at discharge? Yes   Is the patient taking all medications as directed (includes completed medication regime)? Yes   Comments regarding appointments Cards apt on 8/13/24   Does the patient have a primary care provider?  Yes   Has the patient kept scheduled appointments due by today? Yes   Pulse Ox monitoring None   Did the patient receive a copy of their discharge instructions? Yes   Nursing interventions Reviewed instructions with patient   What is the patient's perception of their health status since discharge? Same  [\"Trouble breathing/feet swollen\"-daughter states pt is not interested in returning to the ER at this time.  Daughter stated she plans to purchase a pulse ox]   Nursing Interventions Nurse provided patient education   If the patient is a current smoker, are they able to teach back resources for cessation? Not a smoker   Is the patient/caregiver able to teach back the hierarchy of who to call/visit for symptoms/problems? PCP, Specialist, Home health nurse, Urgent Care, ED, 911 Yes   Is the patient/caregiver able to teach back signs and symptoms of worsening condition: Fever/chills, Shortness of breath, Chest pain   Is the patient/caregiver able to teach back importance of completing antibiotic " course of treatment? Yes   Week 1 call completed? Yes   Call end time 0902            Pushpa H - Registered Nurse

## 2024-08-08 ENCOUNTER — READMISSION MANAGEMENT (OUTPATIENT)
Dept: CALL CENTER | Facility: HOSPITAL | Age: 84
End: 2024-08-08
Payer: MEDICARE

## 2024-08-08 NOTE — OUTREACH NOTE
COPD/PN Week 2 Survey      Flowsheet Row Responses   [facility] patient discharged from? [facility]   Does the patient have one of the following disease processes/diagnoses(primary or secondary)? Pneumonia   Week 2 attempt successful? No   Unsuccessful attempts Attempt 1            Hayley MORALES - Registered Nurse

## 2024-09-06 ENCOUNTER — TRANSCRIBE ORDERS (OUTPATIENT)
Dept: VASCULAR SURGERY | Facility: HOSPITAL | Age: 84
End: 2024-09-06
Payer: MEDICARE

## 2024-09-06 DIAGNOSIS — N18.4 CHRONIC KIDNEY DISEASE, STAGE IV (SEVERE): Primary | ICD-10-CM

## 2024-09-18 ENCOUNTER — HOSPITAL ENCOUNTER (INPATIENT)
Facility: HOSPITAL | Age: 84
LOS: 3 days | Discharge: HOME HEALTH | DRG: 280 | End: 2024-09-21
Attending: EMERGENCY MEDICINE | Admitting: FAMILY MEDICINE
Payer: MEDICARE

## 2024-09-18 ENCOUNTER — APPOINTMENT (OUTPATIENT)
Dept: CT IMAGING | Facility: HOSPITAL | Age: 84
DRG: 280 | End: 2024-09-18
Payer: MEDICARE

## 2024-09-18 ENCOUNTER — APPOINTMENT (OUTPATIENT)
Dept: GENERAL RADIOLOGY | Facility: HOSPITAL | Age: 84
DRG: 280 | End: 2024-09-18
Payer: MEDICARE

## 2024-09-18 DIAGNOSIS — J90 RECURRENT LEFT PLEURAL EFFUSION: Primary | ICD-10-CM
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
## 2024-09-18 DIAGNOSIS — I50.9 CHRONIC CONGESTIVE HEART FAILURE, UNSPECIFIED HEART FAILURE TYPE: ICD-10-CM

## 2024-09-18 DIAGNOSIS — J18.9 HOSPITAL-ACQUIRED PNEUMONIA: ICD-10-CM

## 2024-09-18 LAB
ALBUMIN SERPL-MCNC: 3.4 G/DL (ref 3.5–5.2)
ALBUMIN/GLOB SERPL: 1.2 G/DL
ALP SERPL-CCNC: 79 U/L (ref 39–117)
ALT SERPL W P-5'-P-CCNC: 9 U/L (ref 1–41)
ANION GAP SERPL CALCULATED.3IONS-SCNC: 14.4 MMOL/L (ref 5–15)
ARTERIAL PATENCY WRIST A: ABNORMAL
AST SERPL-CCNC: 15 U/L (ref 1–40)
ATMOSPHERIC PRESS: 742.1 MMHG
BACTERIA UR QL AUTO: NORMAL /HPF
BASE EXCESS BLDA CALC-SCNC: -1.6 MMOL/L (ref -2–2)
BASOPHILS # BLD AUTO: 0.05 10*3/MM3 (ref 0–0.2)
BASOPHILS NFR BLD AUTO: 0.7 % (ref 0–1.5)
BDY SITE: ABNORMAL
BILIRUB SERPL-MCNC: 0.2 MG/DL (ref 0–1.2)
BILIRUB UR QL STRIP: NEGATIVE
BUN SERPL-MCNC: 42 MG/DL (ref 8–23)
BUN/CREAT SERPL: 8.9 (ref 7–25)
CALCIUM SPEC-SCNC: 8.8 MG/DL (ref 8.6–10.5)
CHLORIDE SERPL-SCNC: 104 MMOL/L (ref 98–107)
CLARITY UR: CLEAR
CO2 SERPL-SCNC: 20.6 MMOL/L (ref 22–29)
COLOR UR: YELLOW
CREAT SERPL-MCNC: 4.71 MG/DL (ref 0.76–1.27)
D-LACTATE SERPL-SCNC: 0.7 MMOL/L (ref 0.5–2)
DEPRECATED RDW RBC AUTO: 46.7 FL (ref 37–54)
EGFRCR SERPLBLD CKD-EPI 2021: 11.6 ML/MIN/1.73
EOSINOPHIL # BLD AUTO: 0.16 10*3/MM3 (ref 0–0.4)
EOSINOPHIL NFR BLD AUTO: 2.2 % (ref 0.3–6.2)
ERYTHROCYTE [DISTWIDTH] IN BLOOD BY AUTOMATED COUNT: 13.5 % (ref 12.3–15.4)
FLUAV SUBTYP SPEC NAA+PROBE: NOT DETECTED
FLUBV RNA ISLT QL NAA+PROBE: NOT DETECTED
GEN 5 2HR TROPONIN T REFLEX: 76 NG/L
GLOBULIN UR ELPH-MCNC: 2.9 GM/DL
GLUCOSE SERPL-MCNC: 123 MG/DL (ref 65–99)
GLUCOSE UR STRIP-MCNC: NEGATIVE MG/DL
HCO3 BLDA-SCNC: 22.2 MMOL/L (ref 22–26)
HCT VFR BLD AUTO: 27 % (ref 37.5–51)
HCT VFR BLD CALC: 23 % (ref 38–51)
HEMODILUTION: NO
HGB BLD-MCNC: 8.6 G/DL (ref 13–17.7)
HGB BLDA-MCNC: 7.8 G/DL (ref 12–18)
HGB UR QL STRIP.AUTO: NEGATIVE
HOLD SPECIMEN: NORMAL
HOLD SPECIMEN: NORMAL
HYALINE CASTS UR QL AUTO: NORMAL /LPF
IMM GRANULOCYTES # BLD AUTO: 0.03 10*3/MM3 (ref 0–0.05)
IMM GRANULOCYTES NFR BLD AUTO: 0.4 % (ref 0–0.5)
INHALED O2 CONCENTRATION: 21 %
KETONES UR QL STRIP: NEGATIVE
LEUKOCYTE ESTERASE UR QL STRIP.AUTO: NEGATIVE
LYMPHOCYTES # BLD AUTO: 0.59 10*3/MM3 (ref 0.7–3.1)
LYMPHOCYTES NFR BLD AUTO: 8.2 % (ref 19.6–45.3)
MCH RBC QN AUTO: 30.2 PG (ref 26.6–33)
MCHC RBC AUTO-ENTMCNC: 31.9 G/DL (ref 31.5–35.7)
MCV RBC AUTO: 94.7 FL (ref 79–97)
MODALITY: ABNORMAL
MONOCYTES # BLD AUTO: 0.59 10*3/MM3 (ref 0.1–0.9)
MONOCYTES NFR BLD AUTO: 8.2 % (ref 5–12)
NEUTROPHILS NFR BLD AUTO: 5.8 10*3/MM3 (ref 1.7–7)
NEUTROPHILS NFR BLD AUTO: 80.3 % (ref 42.7–76)
NITRITE UR QL STRIP: NEGATIVE
NRBC BLD AUTO-RTO: 0 /100 WBC (ref 0–0.2)
NT-PROBNP SERPL-MCNC: ABNORMAL PG/ML (ref 0–1800)
PCO2 BLDA: 32.4 MM HG (ref 35–45)
PH BLDA: 7.44 PH UNITS (ref 7.35–7.45)
PH UR STRIP.AUTO: 6.5 [PH] (ref 5–8)
PLATELET # BLD AUTO: 248 10*3/MM3 (ref 140–450)
PMV BLD AUTO: 8.9 FL (ref 6–12)
PO2 BLD: 332 MM[HG] (ref 0–500)
PO2 BLDA: 69.8 MM HG (ref 80–100)
POTASSIUM SERPL-SCNC: 4.1 MMOL/L (ref 3.5–5.2)
PROT SERPL-MCNC: 6.3 G/DL (ref 6–8.5)
PROT UR QL STRIP: ABNORMAL
RBC # BLD AUTO: 2.85 10*6/MM3 (ref 4.14–5.8)
RBC # UR STRIP: NORMAL /HPF
REF LAB TEST METHOD: NORMAL
RSV RNA NPH QL NAA+NON-PROBE: NOT DETECTED
SAO2 % BLDCOA: 94.6 % (ref 95–99)
SARS-COV-2 RNA RESP QL NAA+PROBE: NOT DETECTED
SODIUM SERPL-SCNC: 139 MMOL/L (ref 136–145)
SP GR UR STRIP: 1.01 (ref 1–1.03)
SQUAMOUS #/AREA URNS HPF: NORMAL /HPF
TROPONIN T DELTA: 4 NG/L
TROPONIN T SERPL HS-MCNC: 72 NG/L
UROBILINOGEN UR QL STRIP: ABNORMAL
WBC # UR STRIP: NORMAL /HPF
WBC NRBC COR # BLD AUTO: 7.22 10*3/MM3 (ref 3.4–10.8)
WHOLE BLOOD HOLD COAG: NORMAL
WHOLE BLOOD HOLD SPECIMEN: NORMAL

## 2024-09-18 PROCEDURE — 80053 COMPREHEN METABOLIC PANEL: CPT

## 2024-09-18 PROCEDURE — 87040 BLOOD CULTURE FOR BACTERIA: CPT | Performed by: EMERGENCY MEDICINE

## 2024-09-18 PROCEDURE — 36600 WITHDRAWAL OF ARTERIAL BLOOD: CPT | Performed by: EMERGENCY MEDICINE

## 2024-09-18 PROCEDURE — 83880 ASSAY OF NATRIURETIC PEPTIDE: CPT

## 2024-09-18 PROCEDURE — 36415 COLL VENOUS BLD VENIPUNCTURE: CPT

## 2024-09-18 PROCEDURE — 82570 ASSAY OF URINE CREATININE: CPT | Performed by: FAMILY MEDICINE

## 2024-09-18 PROCEDURE — 71250 CT THORAX DX C-: CPT

## 2024-09-18 PROCEDURE — 87637 SARSCOV2&INF A&B&RSV AMP PRB: CPT | Performed by: EMERGENCY MEDICINE

## 2024-09-18 PROCEDURE — 99223 1ST HOSP IP/OBS HIGH 75: CPT | Performed by: FAMILY MEDICINE

## 2024-09-18 PROCEDURE — 85025 COMPLETE CBC W/AUTO DIFF WBC: CPT

## 2024-09-18 PROCEDURE — 84484 ASSAY OF TROPONIN QUANT: CPT | Performed by: EMERGENCY MEDICINE

## 2024-09-18 PROCEDURE — 81001 URINALYSIS AUTO W/SCOPE: CPT | Performed by: EMERGENCY MEDICINE

## 2024-09-18 PROCEDURE — 99285 EMERGENCY DEPT VISIT HI MDM: CPT

## 2024-09-18 PROCEDURE — 84484 ASSAY OF TROPONIN QUANT: CPT

## 2024-09-18 PROCEDURE — 25010000002 CEFEPIME PER 500 MG: Performed by: EMERGENCY MEDICINE

## 2024-09-18 PROCEDURE — 83605 ASSAY OF LACTIC ACID: CPT | Performed by: EMERGENCY MEDICINE

## 2024-09-18 PROCEDURE — 71045 X-RAY EXAM CHEST 1 VIEW: CPT

## 2024-09-18 PROCEDURE — 82803 BLOOD GASES ANY COMBINATION: CPT | Performed by: EMERGENCY MEDICINE

## 2024-09-18 RX ORDER — SODIUM CHLORIDE 0.9 % (FLUSH) 0.9 %
10 SYRINGE (ML) INJECTION AS NEEDED
Status: DISCONTINUED | OUTPATIENT
Start: 2024-09-18 | End: 2024-09-21 | Stop reason: HOSPADM

## 2024-09-18 RX ORDER — VANCOMYCIN/0.9 % SOD CHLORIDE 1.5G/250ML
20 PLASTIC BAG, INJECTION (ML) INTRAVENOUS ONCE
Status: COMPLETED | OUTPATIENT
Start: 2024-09-18 | End: 2024-09-19

## 2024-09-18 RX ADMIN — CEFEPIME 2000 MG: 2 INJECTION, POWDER, FOR SOLUTION INTRAVENOUS at 23:05

## 2024-09-18 NOTE — ED PROVIDER NOTES
"Time: 4:49 PM EDT  Date of encounter:  9/18/2024  Independent Historian/Clinical History and Information was obtained by:   Patient and Family    History is limited by: N/A    Chief Complaint: SOA      History of Present Illness:  Patient is a 84 y.o. year old male who presents to the emergency department for evaluation of left lower chest pain and difficulty breathing.  Patient states he is not breathing well.  Patient describes sensation of feeling he is being smothered.  Patient reports that he was admitted in July for pneumonia.  Patient reports the pain is also affecting his breathing.  Patient states he has had the pain pretty much ever since he was discharged but in the last couple days its gotten significantly worse.  Patient reports he has been having intermittent fevers as well.  Patient was seen by his PCP a few days ago and told he had a \"infection\" in his lungs however his daughter reports that he was told he had an effusion.  No treatment was administered for the effusion.      Patient Care Team  Primary Care Provider: Allison Villafana MD    Past Medical History:     Allergies   Allergen Reactions    Aspirin Other (See Comments)      thins blood     Past Medical History:   Diagnosis Date    Aortic aneurysm     Chronic renal failure     Hyperlipidemia     Hypertension     Monoclonal gammopathy     Peripheral vascular disease     Renal disorder     Skin cancer     TIA (transient ischemic attack)     Vitamin D deficiency      Past Surgical History:   Procedure Laterality Date    TONSILLECTOMY       History reviewed. No pertinent family history.    Home Medications:  Prior to Admission medications    Medication Sig Start Date End Date Taking? Authorizing Provider   amLODIPine (NORVASC) 10 MG tablet Take 1 tablet by mouth Daily.    Provider, MD Cholo   azithromycin (ZITHROMAX) 250 MG tablet Indications: Pneumonia 7/23/24   Billy Mosqueda MD   calcitriol (ROCALTROL) 0.25 MCG capsule Take 1 capsule " by mouth Daily for 30 days. 24  Billy Mosqueda MD   carvedilol (COREG) 3.125 MG tablet Take 1 tablet by mouth 2 (Two) Times a Day With Meals for 30 days. 24  Billy Mosqueda MD   famotidine (PEPCID) 20 MG tablet Take 1 tablet by mouth Every Morning.    ProviderCholo MD   furosemide (Lasix) 40 MG tablet Take 1 tablet by mouth Daily. 24  Billy Mosqueda MD   hydrALAZINE (APRESOLINE) 25 MG tablet Take 1 tablet by mouth 3 (Three) Times a Day.    ProviderCholo MD   Magnesium 250 MG tablet Take 1 tablet by mouth Every Morning.    Cholo Jaime MD   pravastatin (PRAVACHOL) 40 MG tablet Take 1 tablet by mouth Daily.    ProviderCholo MD   tamsulosin (FLOMAX) 0.4 MG capsule 24 hr capsule Take 1 capsule by mouth Daily. 23   Alex Cohen DO   traMADol (ULTRAM) 50 MG tablet Take 1 tablet by mouth Every 8 (Eight) Hours As Needed for Moderate Pain. 23   Tesfaye Bowens DO        Social History:   Social History     Tobacco Use    Smoking status: Former     Current packs/day: 0.00     Types: Cigarettes     Start date: 2016     Quit date: 2019     Years since quittin.1    Smokeless tobacco: Never   Vaping Use    Vaping status: Never Used   Substance Use Topics    Alcohol use: Not Currently    Drug use: Never         Review of Systems:  Review of Systems   Constitutional:  Negative for chills and fever.   HENT:  Negative for congestion, ear pain and sore throat.    Eyes:  Negative for pain.   Respiratory:  Positive for shortness of breath. Negative for cough and chest tightness.    Cardiovascular:  Positive for chest pain and leg swelling.   Gastrointestinal:  Negative for abdominal pain, diarrhea, nausea and vomiting.   Genitourinary:  Negative for flank pain and hematuria.   Musculoskeletal:  Negative for joint swelling.   Skin:  Negative for pallor.   Neurological:  Negative for seizures and headaches.   All other systems reviewed  "and are negative.       Physical Exam:  /61 (BP Location: Right arm, Patient Position: Lying)   Pulse 82   Temp 98.2 °F (36.8 °C) (Oral)   Resp 16   Ht 182.9 cm (72\")   Wt 70.7 kg (155 lb 13.8 oz)   SpO2 100%   BMI 21.14 kg/m²     Physical Exam   Vital signs were reviewed under triage note.  General appearance - Patient appears well-developed and well-nourished.  Patient is in no acute distress.  Head - Normocephalic, atraumatic.  Pupils - Equal, round, reactive to light.  Extraocular muscles are intact.  Conjunctiva is clear.  Nasal - Normal inspection.  No evidence of trauma or epistaxis.  Tympanic membranes - Gray, intact without erythema or retractions.  Oral mucosa - Pink and moist without lesions or erythema.  Uvula is midline.  Chest wall - Atraumatic.  Chest wall is nontender.  There are no vesicular rashes noted.  Neck - Supple.  Trachea was midline.  There is no palpable lymphadenopathy or thyromegaly.  There are no meningeal signs  Lungs - Auscultation reveals some fine crackles in the right lung base.  Patient had absent breath sounds from the left midlung and to the left lower lung.  Heart - Regular rate and rhythm without any murmurs, clicks, or gallops.  Abdomen - Soft.  Bowel sounds are present.  There is no palpable tenderness.  There is no rebound, guarding, or rigidity.  There are no palpable masses.  There are no pulsatile masses.  Back - Spine is straight and midline.  There is no CVA tenderness.  Extremities - Intact x4 with full range of motion.  There is 2+ palpable edema in his bilateral feet and ankles.  Pulses are intact x4 and equal.  Neurologic - Patient is awake, alert, and oriented x3.  Cranial nerves II through XII are grossly intact.  Motor and sensory functions grossly intact.  Cerebellar function was normal.  Integument - There are no rashes.  There are no petechia or purpura lesions noted.  There are no vesicular lesions noted.      "       Procedures:  Procedures      Medical Decision Making:      Comorbidities that affect care:    CKD stage V, hypertension, hyperlipidemia, monoclonal gammopathy, vitamin D deficiency, peripheral vascular disease, TIA, abdominal aortic aneurysm    External Notes reviewed:    Hospital Discharge Summary: Hospital discharge summary from 7/22/2024 was reviewed by me.      The following orders were placed and all results were independently analyzed by me:  Orders Placed This Encounter   Procedures    Thoracentesis at Bedside    COVID-19, FLU A/B, RSV PCR 1 HR TAT - Swab, Nasopharynx    Blood Culture - Blood,    Blood Culture - Blood,    Mycoplasma Pneumoniae PCR - Swab, Nasopharynx    S. Pneumo Ag Urine or CSF - Urine, Urine, Clean Catch    Legionella Antigen, Urine - Urine, Urine, Clean Catch    MRSA Screen, PCR (Inpatient) - Swab, Nares    Body Fluid Culture - Body Fluid, Pleural Cavity    Respiratory Culture - Sputum, Cough    Anaerobic Culture - Pleural Fluid, Pleural Cavity    XR Chest 1 View    CT Chest Without Contrast Diagnostic    XR Chest 1 View    [place] Draw    Comprehensive Metabolic Panel    BNP    Single High Sensitivity Troponin T    Urinalysis With Microscopic If Indicated (No Culture) - Urine, Clean Catch    CBC Auto Differential    High Sensitivity Troponin T 2Hr    Blood Gas, Arterial -    Urinalysis, Microscopic Only - Urine, Clean Catch    Lactic Acid, Plasma    Blood Gas, Arterial -    Sodium, Urine, Random - Urine, Clean Catch    Creatinine Urine Random (kidney function) GFR component - Urine, Clean Catch    Body Fluid Cell Count With Differential - Pleural Fluid, Pleural Cavity    pH, Body Fluid - Pleural Fluid, Pleural Cavity    Albumin, Fluid - Pleural Fluid, Pleural Cavity    Protein, Body Fluid - Pleural Fluid, Pleural Cavity    CBC Auto Differential    Basic Metabolic Panel    Magnesium    Phosphorus    Hepatic Function Panel    Vancomycin, Random    PTH, Intact    Comprehensive  Metabolic Panel    Body fluid cell count - Pleural Fluid, Pleural Cavity    Lactate Dehydrogenase, Body Fluid - Body Fluid, Pleural Cavity    Glucose, Body Fluid - Pleural Fluid, Pleural Cavity    Lactate Dehydrogenase    Body fluid differential - Pleural Fluid, Pleural Cavity    Procalcitonin    Diet: Cardiac; Healthy Heart (2-3 Na+); Fluid Consistency: Thin (IDDSI 0)    Undress & Gown    Vital Signs    Vital Signs    Intake & Output    Weigh Patient    Nursing Dysphagia Screening (Complete Prior to Giving Anything By Mouth)    RN to Place Order SLP Consult - Eval & Treat Choosing Reason of RN Dysphagia Screen Failed    Nurse to Call MD or Nutrition Services for Diet if Patient Passes Dysphagia Screen    Maintain IV Access    Telemetry - Place Orders & Notify Provider of Results When Patient Experiences Acute Chest Pain, Dysrhythmia or Respiratory Distress    Continuous Pulse Oximetry    Activity - Ad Virgen    Notify Provider (With Default Parameters)    Verify Informed Consent    Obtain Informed Consent    Code Status and Medical Interventions: CPR (Attempt to Resuscitate); Full Support    Hospitalist (on-call MD unless specified)    Inpatient Nephrology Consult    Inpatient Pulmonology Consult    Oxygen Therapy- Nasal Cannula; Titrate 1-6 LPM Per SpO2; 90 - 95%    Incentive Spirometry    Oxygen Therapy- Nasal Cannula; Titrate 1-6 LPM Per SpO2; 90 - 95%    RT to Initiate Bronchopulmonary Hygiene Protocol    Oscillating Positive Expiratory Pressure (OPEP)    Insert Peripheral IV    Insert Peripheral IV    Inpatient Admission    CBC & Differential    Green Top (Gel)    Lavender Top    Gold Top - SST    Light Blue Top    CBC & Differential       Medications Given in the Emergency Department:  Medications   sodium chloride 0.9 % flush 10 mL (has no administration in time range)   sodium chloride 0.9 % flush 10 mL (10 mL Intravenous Given 9/19/24 0858)   sodium chloride 0.9 % flush 10 mL (has no administration in time  range)   sodium chloride 0.9 % infusion 40 mL (has no administration in time range)   heparin (porcine) 5000 UNIT/ML injection 5,000 Units (5,000 Units Subcutaneous Given 9/19/24 0912)   nitroglycerin (NITROSTAT) SL tablet 0.4 mg (has no administration in time range)   cefepime 2000 mg IVPB in 100 mL NS (VTB) (2,000 mg Intravenous New Bag 9/19/24 1109)   Pharmacy to dose vancomycin (has no administration in time range)   sennosides-docusate (PERICOLACE) 8.6-50 MG per tablet 2 tablet (has no administration in time range)     And   polyethylene glycol (MIRALAX) packet 17 g (has no administration in time range)     And   bisacodyl (DULCOLAX) EC tablet 5 mg (has no administration in time range)     And   bisacodyl (DULCOLAX) suppository 10 mg (has no administration in time range)   HYDROcodone-acetaminophen (NORCO) 5-325 MG per tablet 1 tablet (has no administration in time range)   ondansetron (ZOFRAN) injection 4 mg (has no administration in time range)   furosemide (LASIX) injection 60 mg (60 mg Intravenous Given 9/19/24 0455)   Vancomycin Pharmacy Intermittent/Pulse Dosing ( Does not apply Not Given 9/19/24 0917)   epoetin williams (EPOGEN,PROCRIT) injection 20,000 Units (has no administration in time range)   vancomycin IVPB 1500 mg in 0.9% NaCl (Premix) 500 mL (0 mg Intravenous Stopped 9/19/24 0143)   cefepime 2000 mg IVPB in 100 mL NS (VTB) (0 mg Intravenous Stopped 9/19/24 0000)        ED Course:    ED Course as of 09/19/24 1225   Wed Sep 18, 2024   1650 --- PROVIDER IN TRIAGE NOTE ---    The patient was evaluated by Osmar bhatt in triage. Orders were placed and the patient is currently awaiting disposition.    [AJ]      ED Course User Index  [AJ] Osmar Palacios PA-C       The patient was seen and evaluated in the ED by me.  The above history and physical examination was performed as documented.  Diagnostic data was obtained.  Results reviewed.  Patient has a moderate to large effusion on the left  side and a small effusion on the right side.  This causes of the patient's physical exam.  With the patient's history I recommend patient be admitted for further pulmonary evaluation and treatment.  Consult the hospital service for admission.  They note the patient has recently been at [facility].  Recommend starting the patient on hospital-acquired pneumonia treatment and recommend getting noncontrast CT scan.  Patient is agreeable to his treatment plan.    Labs:    Lab Results (last 24 hours)       Procedure Component Value Units Date/Time    CBC & Differential [[id number]]  (Abnormal) Collected: 09/18/24 1655    Specimen: Blood from Arm, Right Updated: 09/18/24 1704    Narrative:      The following orders were created for panel order CBC & Differential.  Procedure                               Abnormality         Status                     ---------                               -----------         ------                     CBC Auto Differential[[id number]]        Abnormal            Final result                 Please view results for these tests on the individual orders.    Comprehensive Metabolic Panel [[id number]]  (Abnormal) Collected: 09/18/24 1655    Specimen: Blood from Arm, Right Updated: 09/18/24 1726     Glucose 123 mg/dL      BUN 42 mg/dL      Creatinine 4.71 mg/dL      Sodium 139 mmol/L      Potassium 4.1 mmol/L      Chloride 104 mmol/L      CO2 20.6 mmol/L      Calcium 8.8 mg/dL      Total Protein 6.3 g/dL      Albumin 3.4 g/dL      ALT (SGPT) 9 U/L      AST (SGOT) 15 U/L      Alkaline Phosphatase 79 U/L      Total Bilirubin 0.2 mg/dL      Globulin 2.9 gm/dL      A/G Ratio 1.2 g/dL      BUN/Creatinine Ratio 8.9     Anion Gap 14.4 mmol/L      eGFR 11.6 mL/min/1.73      Comment: <15 Indicative of kidney failure       Narrative:      GFR Normal >60  Chronic Kidney Disease <60  Kidney Failure <15    The GFR formula is only valid for adults with stable renal function between ages 18 and 70.    BNP [[id number]]   (Abnormal) Collected: 09/18/24 1655    Specimen: Blood from Arm, Right Updated: 09/18/24 1721     proBNP 32,427.0 pg/mL     Narrative:      This assay is used as an aid in the diagnosis of individuals suspected of having heart failure. It can be used as an aid in the diagnosis of acute decompensated heart failure (ADHF) in patients presenting with signs and symptoms of ADHF to the emergency department (ED). In addition, NT-proBNP of <300 pg/mL indicates ADHF is not likely.    Age Range Result Interpretation  NT-proBNP Concentration (pg/mL:      <50             Positive            >450                   Gray                 300-450                    Negative             <300    50-75           Positive            >900                  Gray                300-900                  Negative            <300      >75             Positive            >1800                  Gray                300-1800                  Negative            <300    Single High Sensitivity Troponin T [[id number]]  (Abnormal) Collected: 09/18/24 1655    Specimen: Blood from Arm, Right Updated: 09/18/24 1736     HS Troponin T 72 ng/L     Narrative:      High Sensitive Troponin T Reference Range:  <14.0 ng/L- Negative Female for AMI  <22.0 ng/L- Negative Male for AMI  >=14 - Abnormal Female indicating possible myocardial injury.  >=22 - Abnormal Male indicating possible myocardial injury.   Clinicians would have to utilize clinical acumen, EKG, Troponin, and serial changes to determine if it is an Acute Myocardial Infarction or myocardial injury due to an underlying chronic condition.         CBC Auto Differential [[id number]]  (Abnormal) Collected: 09/18/24 1655    Specimen: Blood from Arm, Right Updated: 09/18/24 1704     WBC 7.22 10*3/mm3      RBC 2.85 10*6/mm3      Hemoglobin 8.6 g/dL      Hematocrit 27.0 %      MCV 94.7 fL      MCH 30.2 pg      MCHC 31.9 g/dL      RDW 13.5 %      RDW-SD 46.7 fl      MPV 8.9 fL      Platelets 248 10*3/mm3       Neutrophil % 80.3 %      Lymphocyte % 8.2 %      Monocyte % 8.2 %      Eosinophil % 2.2 %      Basophil % 0.7 %      Immature Grans % 0.4 %      Neutrophils, Absolute 5.80 10*3/mm3      Lymphocytes, Absolute 0.59 10*3/mm3      Monocytes, Absolute 0.59 10*3/mm3      Eosinophils, Absolute 0.16 10*3/mm3      Basophils, Absolute 0.05 10*3/mm3      Immature Grans, Absolute 0.03 10*3/mm3      nRBC 0.0 /100 WBC     COVID-19, FLU A/B, RSV PCR 1 HR TAT - Swab, Nasopharynx [[id number]]  (Normal) Collected: 09/18/24 1923    Specimen: Swab from Nasopharynx Updated: 09/18/24 2009     COVID19 Not Detected     Influenza A PCR Not Detected     Influenza B PCR Not Detected     RSV, PCR Not Detected    Narrative:      Fact sheet for providers: https://www.fda.gov/media/114607/download    Fact sheet for patients: https://www.fda.gov/media/026986/download    Test performed by PCR.    High Sensitivity Troponin T 2Hr [[id number]]  (Abnormal) Collected: 09/18/24 1931    Specimen: Blood Updated: 09/18/24 2005     HS Troponin T 76 ng/L      Troponin T Delta 4 ng/L     Narrative:      High Sensitive Troponin T Reference Range:  <14.0 ng/L- Negative Female for AMI  <22.0 ng/L- Negative Male for AMI  >=14 - Abnormal Female indicating possible myocardial injury.  >=22 - Abnormal Male indicating possible myocardial injury.   Clinicians would have to utilize clinical acumen, EKG, Troponin, and serial changes to determine if it is an Acute Myocardial Infarction or myocardial injury due to an underlying chronic condition.         Blood Gas, Arterial - [[id number]]  (Abnormal) Collected: 09/18/24 2052    Specimen: Arterial Blood Updated: 09/18/24 2055     Site Right Brachial     Simone's Test N/A     pH, Arterial 7.443 pH units      pCO2, Arterial 32.4 mm Hg      pO2, Arterial 69.8 mm Hg      HCO3, Arterial 22.2 mmol/L      Base Excess, Arterial -1.6 mmol/L      Comment: Serial Number: [id number]:  [id number]        O2 Saturation, Arterial 94.6 %       Hemoglobin, Blood Gas 7.8 g/dL      Hematocrit, Blood Gas 23.0 %      Barometric Pressure for Blood Gas 742.1000 mmHg      Modality Room Air     FIO2 21 %      Hemodilution No     PO2/FIO2 332    Urinalysis With Microscopic If Indicated (No Culture) - Urine, Clean Catch [[id number]]  (Abnormal) Collected: 09/18/24 2151    Specimen: Urine, Clean Catch Updated: 09/18/24 2202     Color, UA Yellow     Appearance, UA Clear     pH, UA 6.5     Specific Gravity, UA 1.014     Glucose, UA Negative     Ketones, UA Negative     Bilirubin, UA Negative     Blood, UA Negative     Protein, UA >=300 mg/dL (3+)     Leuk Esterase, UA Negative     Nitrite, UA Negative     Urobilinogen, UA 0.2 E.U./dL    Urinalysis, Microscopic Only - Urine, Clean Catch [[id number]] Collected: 09/18/24 2151    Specimen: Urine, Clean Catch Updated: 09/18/24 2202     RBC, UA 0-2 /HPF      WBC, UA 0-2 /HPF      Bacteria, UA None Seen /HPF      Squamous Epithelial Cells, UA 0-2 /HPF      Hyaline Casts, UA 3-6 /LPF      Methodology Automated Microscopy    Creatinine Urine Random (kidney function) GFR component - Urine, Clean Catch [[id number]] Collected: 09/18/24 2151    Specimen: Urine, Clean Catch Updated: 09/19/24 1050     Creatinine, Urine 97.2 mg/dL     Narrative:      Reference intervals for random urine have not been established.  Clinical usage is dependent upon physician's interpretation in combination with other laboratory tests.       Blood Culture - Blood, Arm, Right [[id number]] Collected: 09/18/24 2300    Specimen: Blood from Arm, Right Updated: 09/18/24 2304    Blood Culture - Blood, Hand, Left [[id number]] Collected: 09/18/24 2300    Specimen: Blood from Hand, Left Updated: 09/18/24 2304    Lactic Acid, Plasma [[id number]]  (Normal) Collected: 09/18/24 2300    Specimen: Blood Updated: 09/18/24 2321     Lactate 0.7 mmol/L     Mycoplasma Pneumoniae PCR - Swab, Nasopharynx [[id number]] Collected: 09/19/24 0447    Specimen: Swab from Nasopharynx  Updated: 09/19/24 0452    MRSA Screen, PCR (Inpatient) - Swab, Nares [[id number]]  (Normal) Collected: 09/19/24 0447    Specimen: Swab from Nares Updated: 09/19/24 0643     MRSA PCR No MRSA Detected    Narrative:      The negative predictive value of this diagnostic test is high and should only be used to consider de-escalating anti-MRSA therapy. A positive result may indicate colonization with MRSA and must be correlated clinically.    Sodium, Urine, Random - Urine, Clean Catch [[id number]] Collected: 09/19/24 0454    Specimen: Urine, Clean Catch Updated: 09/19/24 1110     Sodium, Urine 72 mmol/L     Narrative:      Reference intervals for random urine have not been established.  Clinical usage is dependent upon physician's interpretation in combination with other laboratory tests.       S. Pneumo Ag Urine or CSF - Urine, Urine, Clean Catch [[id number]]  (Normal) Collected: 09/19/24 0454    Specimen: Urine, Clean Catch Updated: 09/19/24 0622     Strep Pneumo Ag Negative    Legionella Antigen, Urine - Urine, Urine, Clean Catch [[id number]]  (Normal) Collected: 09/19/24 0454    Specimen: Urine, Clean Catch Updated: 09/19/24 0621     LEGIONELLA ANTIGEN, URINE Negative    Basic Metabolic Panel [[id number]]  (Abnormal) Collected: 09/19/24 0528    Specimen: Blood Updated: 09/19/24 0636     Glucose 87 mg/dL      BUN 41 mg/dL      Creatinine 4.57 mg/dL      Sodium 140 mmol/L      Potassium 4.2 mmol/L      Chloride 106 mmol/L      CO2 20.4 mmol/L      Calcium 8.5 mg/dL      BUN/Creatinine Ratio 9.0     Anion Gap 13.6 mmol/L      eGFR 12.1 mL/min/1.73      Comment: <15 Indicative of kidney failure       Narrative:      GFR Normal >60  Chronic Kidney Disease <60  Kidney Failure <15    The GFR formula is only valid for adults with stable renal function between ages 18 and 70.    CBC & Differential [[id number]]  (Abnormal) Collected: 09/19/24 0528    Specimen: Blood Updated: 09/19/24 0548    Narrative:      The following orders were  created for panel order CBC & Differential.  Procedure                               Abnormality         Status                     ---------                               -----------         ------                     CBC Auto Differential[[id number]]        Abnormal            Final result                 Please view results for these tests on the individual orders.    CBC Auto Differential [[id number]]  (Abnormal) Collected: 09/19/24 0528    Specimen: Blood Updated: 09/19/24 0548     WBC 8.79 10*3/mm3      RBC 2.62 10*6/mm3      Hemoglobin 8.2 g/dL      Hematocrit 24.7 %      MCV 94.3 fL      MCH 31.3 pg      MCHC 33.2 g/dL      RDW 13.5 %      RDW-SD 46.5 fl      MPV 9.4 fL      Platelets 232 10*3/mm3      Neutrophil % 83.0 %      Lymphocyte % 7.1 %      Monocyte % 7.3 %      Eosinophil % 1.5 %      Basophil % 0.6 %      Immature Grans % 0.5 %      Neutrophils, Absolute 7.31 10*3/mm3      Lymphocytes, Absolute 0.62 10*3/mm3      Monocytes, Absolute 0.64 10*3/mm3      Eosinophils, Absolute 0.13 10*3/mm3      Basophils, Absolute 0.05 10*3/mm3      Immature Grans, Absolute 0.04 10*3/mm3      nRBC 0.0 /100 WBC     Lactate Dehydrogenase [[id number]]  (Normal) Collected: 09/19/24 0528    Specimen: Blood Updated: 09/19/24 1023      U/L     Procalcitonin [[id number]]  (Normal) Collected: 09/19/24 0528    Specimen: Blood Updated: 09/19/24 1139     Procalcitonin 0.18 ng/mL     Narrative:      As a Marker for Sepsis (Non-Neonates):    1. <0.5 ng/mL represents a low risk of severe sepsis and/or septic shock.  2. >2 ng/mL represents a high risk of severe sepsis and/or septic shock.    As a Marker for Lower Respiratory Tract Infections that require antibiotic therapy:    PCT on Admission    Antibiotic Therapy       6-12 Hrs later    >0.5                Strongly Recommended  >0.25 - <0.5        Recommended  0.1 - 0.25          Discouraged              Remeasure/reassess PCT  <0.1                Strongly Discouraged      "Remeasure/reassess PCT    As 28 day mortality risk marker: \"Change in Procalcitonin Result\" (>80% or <=80%) if Day 0 (or Day 1) and Day 4 values are available. Refer to http://www.[facility]-pct-calculator.com    Change in PCT <=80%  A decrease of PCT levels below or equal to 80% defines a positive change in PCT test result representing a higher risk for 28-day all-cause mortality of patients diagnosed with severe sepsis for septic shock.    Change in PCT >80%  A decrease of PCT levels of more than 80% defines a negative change in PCT result representing a lower risk for 28-day all-cause mortality of patients diagnosed with severe sepsis or septic shock.    This test is Prognostic not Diagnostic, if elevated correlate with clinical findings before administering antibiotic treatment.        Body Fluid Cell Count With Differential - Pleural Fluid, Pleural Cavity [[id number]] Collected: 09/19/24 1004    Specimen: Pleural Fluid from Pleural Cavity Updated: 09/19/24 1048    Narrative:      The following orders were created for panel order Body Fluid Cell Count With Differential - Pleural Fluid, Pleural Cavity.  Procedure                               Abnormality         Status                     ---------                               -----------         ------                     Body fluid cell count - ...[[id number]]                      Final result               Body fluid differential ...[[id number]]                      Final result                 Please view results for these tests on the individual orders.    pH, Body Fluid - Pleural Fluid, Pleural Cavity [[id number]] Collected: 09/19/24 1004    Specimen: Pleural Fluid from Pleural Cavity Updated: 09/19/24 1010     pH, Fluid 7.50    Albumin, Fluid - Pleural Fluid, Pleural Cavity [[id number]] Collected: 09/19/24 1004    Specimen: Pleural Fluid from Pleural Cavity Updated: 09/19/24 1004    Protein, Body Fluid - Pleural Fluid, Pleural Cavity [[id number]] Collected: " 09/19/24 1004    Specimen: Pleural Fluid from Pleural Cavity Updated: 09/19/24 1004    Body Fluid Culture - Body Fluid, Pleural Cavity [[id number]] Collected: 09/19/24 1004    Specimen: Body Fluid from Pleural Cavity Updated: 09/19/24 1106     Gram Stain No organisms seen    Body fluid cell count - Pleural Fluid, Pleural Cavity [[id number]] Collected: 09/19/24 1004    Specimen: Pleural Fluid from Pleural Cavity Updated: 09/19/24 1013     Color, Fluid Light Yellow     Appearance, Fluid Clear     Nucleated Cells, Fluid 50 /mm3      RBC, Fluid <2,000 /mm3     Narrative:      No reference range established. Physician to interpret results with clinical findings.    Lactate Dehydrogenase, Body Fluid - Body Fluid, Pleural Cavity [[id number]] Collected: 09/19/24 1004    Specimen: Body Fluid from Pleural Cavity Updated: 09/19/24 1023    Glucose, Body Fluid - Pleural Fluid, Pleural Cavity [[id number]] Collected: 09/19/24 1004    Specimen: Pleural Fluid from Pleural Cavity Updated: 09/19/24 1023    Non-gynecologic Cytology [[id number]] Collected: 09/19/24 1004    Specimen: Pleural Fluid from Pleural Cavity Updated: 09/19/24 1023    Anaerobic Culture - Pleural Fluid, Pleural Cavity [[id number]] Collected: 09/19/24 1004    Specimen: Pleural Fluid from Pleural Cavity Updated: 09/19/24 1023    Body fluid differential - Pleural Fluid, Pleural Cavity [[id number]] Collected: 09/19/24 1004    Specimen: Pleural Fluid from Pleural Cavity Updated: 09/19/24 1048     Neutrophils, Fluid 8 %      Lymphocytes, Fluid 28 %      Monocytes, Fluid 45 %      Macrophage, Fluid 19 %              Imaging:    XR Chest 1 View    Result Date: 9/19/2024  XR CHEST 1 VW Date of Exam: 9/19/2024 11:19 AM EDT Indication: Thoracentesis Comparison: Chest radiograph dated 9/18/2024 Findings: The cardiomediastinal silhouette is within normal limits. There is aortic arch atherosclerotic calcification. There is significant decrease in size of the left-sided pleural  effusion compared to the prior examination. There are trace bilateral layering pleural effusions. There is no evidence of pneumothorax. There are degenerative changes of the thoracic spine.     Impression: 1. Decrease in size of the left-sided pleural effusion status post thoracentesis. 2. Layering trace bilateral pleural effusions and associated compressive atelectasis. Electronically Signed: Bebeto Stanford  9/19/2024 11:48 AM EDT  Workstation ID: [id number]    CT Chest Without Contrast Diagnostic    Result Date: 9/19/2024  CT CHEST WO CONTRAST DIAGNOSTIC Date of Exam: 9/18/2024 11:43 PM EDT Indication: Pleural effusion. Comparison: AP chest x-ray 9/18/2024, CT chest 7/20/2024. Technique: Axial CT images were obtained of the chest without contrast administration.  Reconstructed coronal and sagittal images were also obtained. Automated exposure control and iterative construction methods were used. Findings: Bilateral pleural effusions of increased compared to 7/20/2024 CT, now small to moderate on the right and moderate on the left. These are associated with partial bilateral lower lobe compressive atelectasis. There is interlobular septal thickening. Heart  size is enlarged. There are coronary artery calcifications. Mildly prominent mediastinal lymph nodes are stable. There is bilateral gynecomastia. Stable 1.1 cm left hepatic hypodense lesion is incompletely characterized without intravenous contrast but likely represents a cyst. No acute osseous abnormality is identified.     Impression: 1.Increased size of bilateral pleural effusions compared to 7/20/2024 CT, now small to moderate on the right and moderate on the left, with associated partial lower lobe compressive atelectasis. 2.Bilateral interlobular septal thickening, suggestive of pulmonary edema. Electronically Signed: Margarita Ramos  9/19/2024 12:07 AM EDT  Workstation ID: [id number]    XR Chest 1 View    Result Date: 9/18/2024  XR CHEST 1 VW Date of Exam:  9/18/2024 5:17 PM EDT Indication: SOA Triage Protocol Comparison: Chest x-ray dated 7/19/2024 Findings: There is a moderate left pleural effusion which appears similar or slightly larger since 7/19/2024. Trace right pleural effusion is also noted. Bibasilar atelectasis is seen. No pneumothorax is seen. Cardiac silhouette is partially obscured. No acute osseous lesion is seen.     Impression: 1.Moderate left and trace right pleural effusions with bibasilar atelectasis. Electronically Signed: Francisco Arellano MD  9/18/2024 5:32 PM EDT  Workstation ID: [id number]       Differential Diagnosis and Discussion:    Dyspnea: Differential diagnosis includes but is not limited to metabolic acidosis, neurological disorders, psychogenic, asthma, pneumothorax, upper airway obstruction, COPD, pneumonia, noncardiogenic pulmonary edema, interstitial lung disease, anemia, congestive heart failure, and pulmonary embolism    All labs were reviewed and interpreted by me.  All X-rays impressions were independently interpreted by me.  CT scan radiology impression was interpreted by me.    MDM     Amount and/or Complexity of Data Reviewed  Decide to obtain previous medical records or to obtain history from someone other than the patient: yes                       Patient Care Considerations:    SEPSIS FLUIDS: 30 cc/kg bolus was not indicated in this patient due to CKD stage V kidney disease with normal vital signs      Consultants/Shared Management Plan:    Hospitalist: I have discussed the case with Dr. Obrien who agrees to accept the patient for admission.    Social Determinants of Health:    Patient has presented with family members who are responsible, reliable and will ensure follow up care.      Disposition and Care Coordination:    Admit:   Through independent evaluation of the patient's history, physical, and imperical data, the patient meets criteria for inpatient admission to the hospital.        Final diagnoses:   Recurrent left  pleural effusion   Hospital-acquired pneumonia        ED Disposition       ED Disposition   Decision to Admit    Condition   --    Comment   Level of Care: Telemetry [5]   Diagnosis: Pneumonia [600443]   Admitting Physician: DIONY LYON [[id number]]   Certification: I Certify That Inpatient Hospital Services Are Medically Necessary For Greater Than 2 Midnights                 This medical record created using voice recognition software.             Tesfaye Bowens DO  09/19/24 1978

## 2024-09-19 ENCOUNTER — APPOINTMENT (OUTPATIENT)
Dept: GENERAL RADIOLOGY | Facility: HOSPITAL | Age: 84
DRG: 280 | End: 2024-09-19
Payer: MEDICARE

## 2024-09-19 LAB
ALBUMIN FLD-MCNC: 1.4 G/DL
ANION GAP SERPL CALCULATED.3IONS-SCNC: 13.6 MMOL/L (ref 5–15)
APPEARANCE FLD: CLEAR
BASOPHILS # BLD AUTO: 0.05 10*3/MM3 (ref 0–0.2)
BASOPHILS NFR BLD AUTO: 0.6 % (ref 0–1.5)
BUN SERPL-MCNC: 41 MG/DL (ref 8–23)
BUN/CREAT SERPL: 9 (ref 7–25)
CALCIUM SPEC-SCNC: 8.5 MG/DL (ref 8.6–10.5)
CHLORIDE SERPL-SCNC: 106 MMOL/L (ref 98–107)
CO2 SERPL-SCNC: 20.4 MMOL/L (ref 22–29)
COLOR FLD: NORMAL
CREAT SERPL-MCNC: 4.57 MG/DL (ref 0.76–1.27)
CREAT UR-MCNC: 97.2 MG/DL
DEPRECATED RDW RBC AUTO: 46.5 FL (ref 37–54)
EGFRCR SERPLBLD CKD-EPI 2021: 12.1 ML/MIN/1.73
EOSINOPHIL # BLD AUTO: 0.13 10*3/MM3 (ref 0–0.4)
EOSINOPHIL NFR BLD AUTO: 1.5 % (ref 0.3–6.2)
ERYTHROCYTE [DISTWIDTH] IN BLOOD BY AUTOMATED COUNT: 13.5 % (ref 12.3–15.4)
GLUCOSE FLD-MCNC: 92 MG/DL
GLUCOSE SERPL-MCNC: 87 MG/DL (ref 65–99)
HCT VFR BLD AUTO: 24.7 % (ref 37.5–51)
HGB BLD-MCNC: 8.2 G/DL (ref 13–17.7)
IMM GRANULOCYTES # BLD AUTO: 0.04 10*3/MM3 (ref 0–0.05)
IMM GRANULOCYTES NFR BLD AUTO: 0.5 % (ref 0–0.5)
L PNEUMO1 AG UR QL IA: NEGATIVE
LDH FLD-CCNC: 49 U/L
LDH SERPL-CCNC: 197 U/L (ref 135–225)
LYMPHOCYTES # BLD AUTO: 0.62 10*3/MM3 (ref 0.7–3.1)
LYMPHOCYTES NFR BLD AUTO: 7.1 % (ref 19.6–45.3)
LYMPHOCYTES NFR FLD MANUAL: 28 %
MACROPHAGE FLUID: 19 %
MCH RBC QN AUTO: 31.3 PG (ref 26.6–33)
MCHC RBC AUTO-ENTMCNC: 33.2 G/DL (ref 31.5–35.7)
MCV RBC AUTO: 94.3 FL (ref 79–97)
MONOCYTES # BLD AUTO: 0.64 10*3/MM3 (ref 0.1–0.9)
MONOCYTES NFR BLD AUTO: 7.3 % (ref 5–12)
MONOCYTES NFR FLD: 45 %
MRSA DNA SPEC QL NAA+PROBE: NORMAL
NEUTROPHILS NFR BLD AUTO: 7.31 10*3/MM3 (ref 1.7–7)
NEUTROPHILS NFR BLD AUTO: 83 % (ref 42.7–76)
NEUTROPHILS NFR FLD MANUAL: 8 %
NRBC BLD AUTO-RTO: 0 /100 WBC (ref 0–0.2)
NUC CELL # FLD: 50 /MM3
PH FLD: 7.5 [PH]
PLATELET # BLD AUTO: 232 10*3/MM3 (ref 140–450)
PMV BLD AUTO: 9.4 FL (ref 6–12)
POTASSIUM SERPL-SCNC: 4.2 MMOL/L (ref 3.5–5.2)
PROCALCITONIN SERPL-MCNC: 0.18 NG/ML (ref 0–0.25)
PROT FLD-MCNC: 1.6 G/DL
RBC # BLD AUTO: 2.62 10*6/MM3 (ref 4.14–5.8)
RBC # FLD AUTO: <2000 /MM3
S PNEUM AG SPEC QL LA: NEGATIVE
SODIUM SERPL-SCNC: 140 MMOL/L (ref 136–145)
SODIUM UR-SCNC: 72 MMOL/L
WBC NRBC COR # BLD AUTO: 8.79 10*3/MM3 (ref 3.4–10.8)

## 2024-09-19 PROCEDURE — 83615 LACTATE (LD) (LDH) ENZYME: CPT | Performed by: NURSE PRACTITIONER

## 2024-09-19 PROCEDURE — 87070 CULTURE OTHR SPECIMN AEROBIC: CPT | Performed by: FAMILY MEDICINE

## 2024-09-19 PROCEDURE — 87075 CULTR BACTERIA EXCEPT BLOOD: CPT | Performed by: NURSE PRACTITIONER

## 2024-09-19 PROCEDURE — 25010000002 HEPARIN (PORCINE) PER 1000 UNITS: Performed by: FAMILY MEDICINE

## 2024-09-19 PROCEDURE — 25010000002 CEFEPIME PER 500 MG: Performed by: FAMILY MEDICINE

## 2024-09-19 PROCEDURE — 25010000002 FUROSEMIDE PER 20 MG: Performed by: FAMILY MEDICINE

## 2024-09-19 PROCEDURE — 87581 M.PNEUMON DNA AMP PROBE: CPT | Performed by: FAMILY MEDICINE

## 2024-09-19 PROCEDURE — 25810000003 SODIUM CHLORIDE 0.9 % SOLUTION: Performed by: EMERGENCY MEDICINE

## 2024-09-19 PROCEDURE — 99232 SBSQ HOSP IP/OBS MODERATE 35: CPT | Performed by: FAMILY MEDICINE

## 2024-09-19 PROCEDURE — 71045 X-RAY EXAM CHEST 1 VIEW: CPT

## 2024-09-19 PROCEDURE — 94761 N-INVAS EAR/PLS OXIMETRY MLT: CPT

## 2024-09-19 PROCEDURE — 87899 AGENT NOS ASSAY W/OPTIC: CPT | Performed by: FAMILY MEDICINE

## 2024-09-19 PROCEDURE — 94799 UNLISTED PULMONARY SVC/PX: CPT

## 2024-09-19 PROCEDURE — 80048 BASIC METABOLIC PNL TOTAL CA: CPT | Performed by: FAMILY MEDICINE

## 2024-09-19 PROCEDURE — 83986 ASSAY PH BODY FLUID NOS: CPT | Performed by: FAMILY MEDICINE

## 2024-09-19 PROCEDURE — 88108 CYTOPATH CONCENTRATE TECH: CPT | Performed by: NURSE PRACTITIONER

## 2024-09-19 PROCEDURE — 0W9B3ZZ DRAINAGE OF LEFT PLEURAL CAVITY, PERCUTANEOUS APPROACH: ICD-10-PCS | Performed by: INTERNAL MEDICINE

## 2024-09-19 PROCEDURE — 32555 ASPIRATE PLEURA W/ IMAGING: CPT | Performed by: INTERNAL MEDICINE

## 2024-09-19 PROCEDURE — 87641 MR-STAPH DNA AMP PROBE: CPT | Performed by: FAMILY MEDICINE

## 2024-09-19 PROCEDURE — 87449 NOS EACH ORGANISM AG IA: CPT | Performed by: FAMILY MEDICINE

## 2024-09-19 PROCEDURE — 89051 BODY FLUID CELL COUNT: CPT | Performed by: FAMILY MEDICINE

## 2024-09-19 PROCEDURE — 25010000002 EPOETIN ALFA PER 1000 UNITS: Performed by: INTERNAL MEDICINE

## 2024-09-19 PROCEDURE — 99222 1ST HOSP IP/OBS MODERATE 55: CPT | Performed by: INTERNAL MEDICINE

## 2024-09-19 PROCEDURE — 87205 SMEAR GRAM STAIN: CPT | Performed by: FAMILY MEDICINE

## 2024-09-19 PROCEDURE — 84157 ASSAY OF PROTEIN OTHER: CPT | Performed by: FAMILY MEDICINE

## 2024-09-19 PROCEDURE — 82042 OTHER SOURCE ALBUMIN QUAN EA: CPT | Performed by: FAMILY MEDICINE

## 2024-09-19 PROCEDURE — 82945 GLUCOSE OTHER FLUID: CPT | Performed by: NURSE PRACTITIONER

## 2024-09-19 PROCEDURE — 84145 PROCALCITONIN (PCT): CPT | Performed by: NURSE PRACTITIONER

## 2024-09-19 PROCEDURE — 25010000002 VANCOMYCIN 5 G RECONSTITUTED SOLUTION: Performed by: EMERGENCY MEDICINE

## 2024-09-19 PROCEDURE — 84300 ASSAY OF URINE SODIUM: CPT | Performed by: FAMILY MEDICINE

## 2024-09-19 PROCEDURE — 85025 COMPLETE CBC W/AUTO DIFF WBC: CPT | Performed by: FAMILY MEDICINE

## 2024-09-19 RX ORDER — SODIUM CHLORIDE 0.9 % (FLUSH) 0.9 %
10 SYRINGE (ML) INJECTION AS NEEDED
Status: DISCONTINUED | OUTPATIENT
Start: 2024-09-19 | End: 2024-09-21 | Stop reason: HOSPADM

## 2024-09-19 RX ORDER — SODIUM CHLORIDE 9 MG/ML
40 INJECTION, SOLUTION INTRAVENOUS AS NEEDED
Status: DISCONTINUED | OUTPATIENT
Start: 2024-09-19 | End: 2024-09-21 | Stop reason: HOSPADM

## 2024-09-19 RX ORDER — LEVOFLOXACIN 500 MG/1
500 TABLET, FILM COATED ORAL DAILY
COMMUNITY
Start: 2024-09-14 | End: 2024-09-21 | Stop reason: HOSPADM

## 2024-09-19 RX ORDER — TAMSULOSIN HYDROCHLORIDE 0.4 MG/1
0.4 CAPSULE ORAL DAILY
Status: DISCONTINUED | OUTPATIENT
Start: 2024-09-19 | End: 2024-09-21 | Stop reason: HOSPADM

## 2024-09-19 RX ORDER — POLYETHYLENE GLYCOL 3350 17 G/17G
17 POWDER, FOR SOLUTION ORAL DAILY PRN
Status: DISCONTINUED | OUTPATIENT
Start: 2024-09-19 | End: 2024-09-21 | Stop reason: HOSPADM

## 2024-09-19 RX ORDER — AMOXICILLIN 250 MG
2 CAPSULE ORAL 2 TIMES DAILY PRN
Status: DISCONTINUED | OUTPATIENT
Start: 2024-09-19 | End: 2024-09-21 | Stop reason: HOSPADM

## 2024-09-19 RX ORDER — PRAVASTATIN SODIUM 20 MG
40 TABLET ORAL NIGHTLY
Status: DISCONTINUED | OUTPATIENT
Start: 2024-09-19 | End: 2024-09-21 | Stop reason: HOSPADM

## 2024-09-19 RX ORDER — HYDRALAZINE HYDROCHLORIDE 25 MG/1
25 TABLET, FILM COATED ORAL 3 TIMES DAILY
Status: DISCONTINUED | OUTPATIENT
Start: 2024-09-19 | End: 2024-09-21 | Stop reason: HOSPADM

## 2024-09-19 RX ORDER — ONDANSETRON 2 MG/ML
4 INJECTION INTRAMUSCULAR; INTRAVENOUS EVERY 6 HOURS PRN
Status: DISCONTINUED | OUTPATIENT
Start: 2024-09-19 | End: 2024-09-21 | Stop reason: HOSPADM

## 2024-09-19 RX ORDER — HYDROCODONE BITARTRATE AND ACETAMINOPHEN 5; 325 MG/1; MG/1
1 TABLET ORAL EVERY 6 HOURS PRN
Status: DISCONTINUED | OUTPATIENT
Start: 2024-09-19 | End: 2024-09-21 | Stop reason: HOSPADM

## 2024-09-19 RX ORDER — HEPARIN SODIUM 5000 [USP'U]/ML
5000 INJECTION, SOLUTION INTRAVENOUS; SUBCUTANEOUS EVERY 12 HOURS SCHEDULED
Status: DISCONTINUED | OUTPATIENT
Start: 2024-09-19 | End: 2024-09-21 | Stop reason: HOSPADM

## 2024-09-19 RX ORDER — NITROGLYCERIN 0.4 MG/1
0.4 TABLET SUBLINGUAL
Status: DISCONTINUED | OUTPATIENT
Start: 2024-09-19 | End: 2024-09-21 | Stop reason: HOSPADM

## 2024-09-19 RX ORDER — BISACODYL 5 MG/1
5 TABLET, DELAYED RELEASE ORAL DAILY PRN
Status: DISCONTINUED | OUTPATIENT
Start: 2024-09-19 | End: 2024-09-21 | Stop reason: HOSPADM

## 2024-09-19 RX ORDER — BISACODYL 10 MG
10 SUPPOSITORY, RECTAL RECTAL DAILY PRN
Status: DISCONTINUED | OUTPATIENT
Start: 2024-09-19 | End: 2024-09-21 | Stop reason: HOSPADM

## 2024-09-19 RX ORDER — FUROSEMIDE 10 MG/ML
60 INJECTION INTRAMUSCULAR; INTRAVENOUS EVERY 12 HOURS
Status: DISCONTINUED | OUTPATIENT
Start: 2024-09-19 | End: 2024-09-21

## 2024-09-19 RX ORDER — AMLODIPINE BESYLATE 10 MG/1
10 TABLET ORAL DAILY
Status: DISCONTINUED | OUTPATIENT
Start: 2024-09-19 | End: 2024-09-21 | Stop reason: HOSPADM

## 2024-09-19 RX ORDER — SODIUM CHLORIDE 0.9 % (FLUSH) 0.9 %
10 SYRINGE (ML) INJECTION EVERY 12 HOURS SCHEDULED
Status: DISCONTINUED | OUTPATIENT
Start: 2024-09-19 | End: 2024-09-21 | Stop reason: HOSPADM

## 2024-09-19 RX ADMIN — HEPARIN SODIUM 5000 UNITS: 5000 INJECTION INTRAVENOUS; SUBCUTANEOUS at 09:12

## 2024-09-19 RX ADMIN — HEPARIN SODIUM 5000 UNITS: 5000 INJECTION INTRAVENOUS; SUBCUTANEOUS at 20:57

## 2024-09-19 RX ADMIN — HYDRALAZINE HYDROCHLORIDE 25 MG: 25 TABLET ORAL at 17:35

## 2024-09-19 RX ADMIN — TAMSULOSIN HYDROCHLORIDE 0.4 MG: 0.4 CAPSULE ORAL at 17:35

## 2024-09-19 RX ADMIN — Medication 10 ML: at 08:58

## 2024-09-19 RX ADMIN — AMLODIPINE BESYLATE 10 MG: 10 TABLET ORAL at 17:35

## 2024-09-19 RX ADMIN — HYDRALAZINE HYDROCHLORIDE 25 MG: 25 TABLET ORAL at 20:57

## 2024-09-19 RX ADMIN — CEFEPIME 2000 MG: 2 INJECTION, POWDER, FOR SOLUTION INTRAVENOUS at 11:09

## 2024-09-19 RX ADMIN — Medication 10 ML: at 21:38

## 2024-09-19 RX ADMIN — ERYTHROPOIETIN 20000 UNITS: 20000 INJECTION, SOLUTION INTRAVENOUS; SUBCUTANEOUS at 14:38

## 2024-09-19 RX ADMIN — FUROSEMIDE 60 MG: 10 INJECTION, SOLUTION INTRAMUSCULAR; INTRAVENOUS at 04:55

## 2024-09-19 RX ADMIN — VANCOMYCIN HYDROCHLORIDE 1500 MG: 5 INJECTION, POWDER, LYOPHILIZED, FOR SOLUTION INTRAVENOUS at 00:02

## 2024-09-19 RX ADMIN — FUROSEMIDE 60 MG: 10 INJECTION, SOLUTION INTRAMUSCULAR; INTRAVENOUS at 17:33

## 2024-09-19 RX ADMIN — PRAVASTATIN SODIUM 40 MG: 20 TABLET ORAL at 20:58

## 2024-09-19 NOTE — H&P
"[facility]   HISTORY AND PHYSICAL    Patient Name: Huy Reddy  : 1940  MRN: [id number]  Primary Care Physician:  Allison Villafana MD  Date of admission: 2024    Subjective   Subjective     Chief Complaint: Feet swelling, lower abdominal pain,    History of Present Illness  Patient is a 84-year-old male with past medical history of hypertension, peripheral vascular disease, TIA, chronic renal failure, hyperlipidemia and aortic aneurysm.  Patient presents to the emergency department with several complaints including lower abdominal pain without urinary symptoms, shortness of breath, swelling of both feet and legs.  Patient reportedly has had multiple \"lung infections \"over the last 2 months, most recently 5 days ago at which time he was told that the infection was still there and placed on antibiotics for his third round.  Here in the emergency department he continues to have a moderate-sized pleural effusion with possible underlying infiltrates.  We are awaiting CT scan now.  Review of Systems   As stated above in his present illness all other systems reviewed and subsequently negative  Personal History     Past Medical History:   Diagnosis Date    Aortic aneurysm     Chronic renal failure     Hyperlipidemia     Hypertension     Monoclonal gammopathy     Peripheral vascular disease     Renal disorder     Skin cancer     TIA (transient ischemic attack)     Vitamin D deficiency        Past Surgical History:   Procedure Laterality Date    TONSILLECTOMY         Family History: family history is not on file. Otherwise pertinent FHx was reviewed and not pertinent to current issue.    Social History:  reports that he quit smoking about 5 years ago. His smoking use included cigarettes. He started smoking about 8 years ago. He has never used smokeless tobacco. He reports that he does not currently use alcohol. He reports that he does not use drugs.    Home Medications:  Magnesium, amLODIPine, azithromycin, " calcitriol, carvedilol, famotidine, furosemide, hydrALAZINE, pravastatin, tamsulosin, and traMADol    Allergies:  Allergies   Allergen Reactions    Aspirin Other (See Comments)      thins blood       Objective    Objective     Vitals:   Temp:  [97.9 °F (36.6 °C)-98 °F (36.7 °C)] 98 °F (36.7 °C)  Heart Rate:  [66-81] 81  Resp:  [16-18] 18  BP: (143-179)/(56-87) 163/60    Physical Exam  Constitutional:  Well-developed and well-nourished.  No acute distress.      HENT:  Head:  Normocephalic and atraumatic.  Mouth:  Moist mucous membranes.    Eyes:  Conjunctivae and EOM are normal. No scleral icterus.    Neck:  Neck supple.  No JVD present.    Cardiovascular:  Normal rate, regular rhythm and normal heart sounds with no murmur.  Pulmonary/Chest:  No respiratory distress, no wheezes, no crackles, with normal breath sounds and good air movement.  Abdominal:  Soft. No distension and no tenderness.   Musculoskeletal:  No tenderness, and no deformity.  No red or swollen joints anywhere.    Neurological:  Alert and oriented to person, place, and time.  No cranial nerve deficit.    Skin:  Skin is warm and dry. No rash noted. No pallor.   Peripheral vascular:  No clubbing, no cyanosis, no edema.  Psychiatric: Appropriate mood and affect     Result Review    Result Review:  I have personally reviewed the results from the time of this admission to 9/18/2024 22:58 EDT and agree with these findings:  [x]  Laboratory list / accordion  []  Microbiology  [x]  Radiology  []  EKG/Telemetry   []  Cardiology/Vascular   []  Pathology  []  Old records  []  Other:  Most notable findings include:       Assessment & Plan   Assessment / Plan     Brief Patient Summary:  Huy Reddy is a 84 y.o. male who presented to the emergency department with multiple complaints.  He appears to be in new onset congestive heart failure with an elevated BNP and pleural effusions.  He may also be suffering from pneumonia but he certainly also has worsening renal  failure.  He will be admitted for further evaluation and treatment    Active Hospital Problems:  1.  Moderate left-sided pleural effusion  2.  New onset congestive heart failure  3.  Possible left lung pneumonia  4.  Acute on chronic renal failure  5.  Elevated troponin rule out NSTEMI type II  6.  Symptomatic anemia    Plan:   Patient will be admitted to the hospitalist service  Treating the patient with broad-spectrum IV antibiotics pulmonary infections and recent hospitalization  Will diurese overnight monitoring his ins and outs as well as his renal function  With his worsening renal function I do believe that nephrology should be consulted  Schedule thoracentesis  Continue to support with O2 via nasal cannula  Trend troponin levels  Continue to monitor hemoglobin and hematocrit for possible transfusion    VTE Prophylaxis:  Pharmacologic VTE prophylaxis orders are signed & held.          CODE STATUS:    Code Status (Patient has no pulse and is not breathing): CPR (Attempt to Resuscitate)  Medical Interventions (Patient has pulse or is breathing): Full Support    Admission Status:  I believe this patient meets inpatient status.    Kris Obrien, DO

## 2024-09-19 NOTE — PROGRESS NOTES
[facility]   Hospitalist Progress Note  Date: 2024  Patient Name: Huy Reddy  : 1940  MRN: [id number]  Date of admission: 2024      Subjective   Subjective     Chief complaint: Lower abdominal pain, feet swelling,    Summary:  84-year-old male with history of aortic aneurysm, CKD stage IV, dyslipidemia, chronic diastolic CHF, hypertension, monoclonal gammopathy, PVD, TIA, hospitalized on 2024, with chief complaint of lower abdominal pain and feet swelling found to have volume overload, with recent lung infections, found to have moderately sized pleural effusion, pulmonary consulted for thoracentesis, left-sided thoracentesis, chest CT shows significant sized pleural effusion.  Placed on diuretics and empiric antibiotics.    Interval follow-up: Seen and examined this morning, no acute distress, no acute major overnight events, remained on 2 L nasal cannula oxygen with sats in the 90s, no chest pain or palpitations.  Pulmonary consulted with left-sided pleural effusion requiring drainage, remains on antibiotics, diuretics ordered.  Working towards correcting volume status.  Creatinine 4.57, BUN 41, potassium 4.2, sodium 140, white blood cell count 8000, hemoglobin 8.2.      Review of systems:  All systems reviewed and negative except for weakness, fatigue, shortness of breath with exertional activity      Objective   Objective     Vitals:   Temp:  [97.7 °F (36.5 °C)-98.2 °F (36.8 °C)] 97.9 °F (36.6 °C)  Heart Rate:  [66-84] 79  Resp:  [16-19] 16  BP: (143-179)/(56-87) 165/66  Flow (L/min):  [1-2] 2  Physical Exam    Constitutional: Awake, alert, no acute distress   Eyes: Pupils equal, sclerae anicteric, no conjunctival injection   HENT: NCAT, mucous membranes moist   Neck: Supple, no thyromegaly, no lymphadenopathy, trachea midline   Respiratory: Diminished and coarse to auscultation bilaterally, nonlabored respirations    Cardiovascular: RRR, no murmurs, rubs, or gallops, palpable pedal  pulses bilaterally   Gastrointestinal: Positive bowel sounds, soft, nontender, nondistended   Musculoskeletal: Positive bilateral ankle edema, no clubbing or cyanosis to extremities   Psychiatric: Appropriate affect, cooperative   Neurologic: Oriented x 3, strength symmetric in all extremities, Cranial Nerves grossly intact to confrontation, speech clear   Skin: No rashes visible on exposed skin      Result Review    Result Review:  I have personally reviewed the pertinent results from the past 24 hours to 9/19/2024 15:36 EDT and agree with these findings:  [x]  Laboratory   CBC          8/23/2024    12:37 9/18/2024    16:55 9/19/2024    05:28   CBC   WBC NONE SEEN        8.9     7.22  8.79    RBC 3.2     2.85  2.62    Hemoglobin 10.1     8.6  8.2    Hematocrit 30.5     27.0  24.7    MCV 96.0     94.7  94.3    MCH 31.7     30.2  31.3    MCHC 33.0     31.9  33.2    RDW 14.2     13.5  13.5    Platelets 298     248  232       Details          This result is from an external source.             BMP          7/22/2024    04:43 9/18/2024    16:55 9/19/2024    05:28   BMP   BUN 45  42  41    Creatinine 4.39  4.71  4.57    Sodium 141  139  140    Potassium 3.9  4.1  4.2    Chloride 106  104  106    CO2 22.8  20.6  20.4    Calcium 8.4  8.8  8.5      LIVER FUNCTION TESTS:      Lab 09/18/24  1655   TOTAL PROTEIN 6.3   ALBUMIN 3.4*   GLOBULIN 2.9   ALT (SGPT) 9   AST (SGOT) 15   BILIRUBIN 0.2   ALK PHOS 79       [x]  Microbiology   Microbiology Results (last 10 days)       Procedure Component Value - Date/Time    Body Fluid Culture - Body Fluid, Pleural Cavity [[id number]] Collected: 09/19/24 1004    Lab Status: Preliminary result Specimen: Body Fluid from Pleural Cavity Updated: 09/19/24 1106     Gram Stain No organisms seen    S. Pneumo Ag Urine or CSF - Urine, Urine, Clean Catch [[id number]]  (Normal) Collected: 09/19/24 0454    Lab Status: Final result Specimen: Urine, Clean Catch Updated: 09/19/24 0622     Strep Pneumo Ag  Negative    Legionella Antigen, Urine - Urine, Urine, Clean Catch [[id number]]  (Normal) Collected: 09/19/24 0454    Lab Status: Final result Specimen: Urine, Clean Catch Updated: 09/19/24 0621     LEGIONELLA ANTIGEN, URINE Negative    MRSA Screen, PCR (Inpatient) - Swab, Nares [[id number]]  (Normal) Collected: 09/19/24 0447    Lab Status: Final result Specimen: Swab from Nares Updated: 09/19/24 0643     MRSA PCR No MRSA Detected    Narrative:      The negative predictive value of this diagnostic test is high and should only be used to consider de-escalating anti-MRSA therapy. A positive result may indicate colonization with MRSA and must be correlated clinically.    COVID-19, FLU A/B, RSV PCR 1 HR TAT - Swab, Nasopharynx [[id number]]  (Normal) Collected: 09/18/24 1923    Lab Status: Final result Specimen: Swab from Nasopharynx Updated: 09/18/24 2009     COVID19 Not Detected     Influenza A PCR Not Detected     Influenza B PCR Not Detected     RSV, PCR Not Detected    Narrative:      Fact sheet for providers: https://www.fda.gov/media/320825/download    Fact sheet for patients: https://www.fda.gov/media/745253/download    Test performed by PCR.              [x]  Radiology XR Chest 1 View    Result Date: 9/19/2024  Impression: 1. Decrease in size of the left-sided pleural effusion status post thoracentesis. 2. Layering trace bilateral pleural effusions and associated compressive atelectasis. Electronically Signed: Bebeto Stanford  9/19/2024 11:48 AM EDT  Workstation ID: [id number]    CT Chest Without Contrast Diagnostic    Result Date: 9/19/2024  Impression: 1.Increased size of bilateral pleural effusions compared to 7/20/2024 CT, now small to moderate on the right and moderate on the left, with associated partial lower lobe compressive atelectasis. 2.Bilateral interlobular septal thickening, suggestive of pulmonary edema. Electronically Signed: Margarita Ramos  9/19/2024 12:07 AM EDT  Workstation ID: [id number]    XR Chest 1  View    Result Date: 9/18/2024  Impression: 1.Moderate left and trace right pleural effusions with bibasilar atelectasis. Electronically Signed: Francisco Arellano MD  9/18/2024 5:32 PM EDT  Workstation ID: [id number]       []  EKG/Telemetry   No orders to display       []  Cardiology/Vascular   []  Pathology  [x]  Old records  []  Other:    Assessment & Plan   Assessment / Plan     Assessment/Plan:  Assessment:  Acute decompensation of chronic diastolic CHF  SERJIO with CKD stage IV  Elevated troponins likely type II MI NSTEMI due to respiratory demands  Recent left-sided pneumonia  CKD stage IV  Dyslipidemia  Essential hypertension  Monoclonal gammopathy  PVD  History of TIA    Plan:  Labs and imaging reviewed  Continue cefepime and vancomycin  Monitor and dose vancomycin via AUC method/pulse dosing  Continue Lasix 60 mg IV twice daily  Pulmonary consulted discussed with APRN for Dr. Leon, plans thoracentesis today  Continue hydralazine 25 mg 3 times daily  Continue Pravachol 40 mg nightly  Continue tamsulosin 0.4 mg daily  Continue amlodipine 10 mg daily  Strict I's and O's  Daily weights  A.m. labs  Full code  DVT prophylaxis with heparin  Clinical course dictate further management  Discussed with nurse at the bedside    VTE Prophylaxis:  Pharmacologic VTE prophylaxis orders are present.        CODE STATUS:   Code Status (Patient has no pulse and is not breathing): CPR (Attempt to Resuscitate)  Medical Interventions (Patient has pulse or is breathing): Full Support        Electronically signed by Melody Butler MD, 9/19/2024, 15:36 EDT.    Portions of this documentation were transcribed electronically from a voice recognition software.  I confirm all data accurately represents the service(s) I performed at today's visit.

## 2024-09-19 NOTE — PROGRESS NOTES
"[facility] Clinical Pharmacy Services: Vancomycin Pharmacokinetic Initial Consult Note    Huy Reddy is a 84 y.o. male who is on day 1 of pharmacy to dose vancomycin for Pneumonia.    Consult Information  Consulting Provider: Camille   Planned Duration of Therapy: 7 days  Was Patient Receiving Prior to Admission/Consult?: No  Loading Dose Given: 1500 mg on  at 0002  PK/PD Target: Dose by Levels  Other Antimicrobials: Cefepime    Imaging Reviewed?: Yes    Microbiology Data  MRSA PCR performed:   ; Result: Negative  Culture/Source:   -Blood culture(s): In process    Vitals/Labs  Ht: 182.9 cm (72\"); Wt: 70.7 kg (155 lb 13.8 oz)  Temp (24hrs), Av.9 °F (36.6 °C), Min:97.7 °F (36.5 °C), Max:98 °F (36.7 °C)   Estimated Creatinine Clearance: 12.2 mL/min (A) (by C-G formula based on SCr of 4.57 mg/dL (H)).       Results from last 7 days   Lab Units 24  0528 24  1655   CREATININE mg/dL 4.57* 4.71*   WBC 10*3/mm3 8.79 7.22     Assessment/Plan:    Vancomycin Dose: 1500 mg given this morning. Will continue with pulse dosing   Vanc Random ordered for  at 0600  Patient has order for Basic Metabolic Panel    Pharmacy will follow patient's kidney function and will adjust doses and obtain levels as necessary. Thank you for involving pharmacy in this patient's care. Please contact pharmacy with any questions or concerns.                           Sudarshan Kramer, [facility]  Clinical Pharmacist   "

## 2024-09-19 NOTE — CONSULTS
"Pulmonary / Critical Care Consult Note      Patient Name: Huy Reddy  : 1940  MRN: [id number]  Primary Care Physician:  Allison Villafana MD  Referring Physician: No Known Provider  Date of admission: 2024    Subjective   Subjective     Reason for Consult/ Chief Complaint: Bilateral pleural effusions    HPI:  Huy Reddy is a 84 y.o. male with past medical history of TIA, CKD and aortic aneurysm who presented to the emergency department with several complaints including lower abdominal pain without urinary symptoms, shortness of breath and bilateral lower extremity swelling.  The emergency department, CXR revealed moderate left and trace right pleural effusion with bibasilar atelectasis.  proBNP was elevated at 32,427.  Patient was admitted to hospitalist service for bilateral pleural effusions, concern for pneumonia, decompensated heart failure and acute on chronic renal failure.  CT chest was then obtained revealing increased bilateral pleural effusions from prior imaging on 2024, now moderate right and left effusions.  Pulmonology was then consulted for further evaluation.    Upon exam, patient was noted to be resting comfortably in bed on 2 L nasal cannula.  Family was present at bedside.  He reports he has had worsening shortness of breath with lower extremity swelling over the last several days.  Of note, patient reports he has had multiple \"lung infection\" over the last couple of months.  Most recently treated 5 days ago with his third round of antibiotics without any improvement in symptoms.  Patient was hospitalized most recently in 2024 for multifocal pneumonia, decompensated congestive heart failure and worsening CKD.  At that time, patient did have small bilateral pleural effusions.  He denies any fever, chills, chest pain, cough or hemoptysis.    Review of Systems:  All systems were reviewed and negative except as above    Personal History     Past Medical History:   Diagnosis " Date    Aortic aneurysm     Chronic renal failure     Hyperlipidemia     Hypertension     Monoclonal gammopathy     Peripheral vascular disease     Renal disorder     Skin cancer     TIA (transient ischemic attack)     Vitamin D deficiency        Past Surgical History:   Procedure Laterality Date    TONSILLECTOMY         Family History: family history is not on file. Otherwise pertinent FHx was reviewed and not pertinent to current issue.    Social History:  reports that he quit smoking about 5 years ago. His smoking use included cigarettes. He started smoking about 8 years ago. He has never used smokeless tobacco. He reports that he does not currently use alcohol. He reports that he does not use drugs.    Home Medications:  Magnesium, amLODIPine, azithromycin, calcitriol, carvedilol, famotidine, furosemide, hydrALAZINE, levoFLOXacin, pravastatin, and tamsulosin    Allergies:  Allergies   Allergen Reactions    Aspirin Other (See Comments)      thins blood       Objective    Objective     Vitals:   Temp:  [97.7 °F (36.5 °C)-98 °F (36.7 °C)] 97.7 °F (36.5 °C)  Heart Rate:  [66-84] 73  Resp:  [16-19] 18  BP: (143-179)/(56-87) 158/64  Flow (L/min):  [1-2] 2    Physical Exam:  Vital Signs Reviewed   General: Pleasant, elderly male, Alert, NAD, sitting on side of bed.    Chest: Diminished to auscultation bibasilarly, no work of breathing noted  CV: regular rate and rhythm regular  EXT:  no clubbing, no cyanosis, BLE edema  Neuro:  A&Ox3, moving all 4 extremities spontaneously  Skin: No rashes or lesions noted      Result Review    Result Review:  I have personally reviewed the results from the time of this admission to 9/19/2024 08:37 EDT and agree with these findings:  [x]  Laboratory  [x]  Microbiology  [x]  Radiology  [x]  EKG/Telemetry   []  Cardiology/Vascular   []  Pathology  []  Old records  []  Other:  Most notable findings include:   proBNP 32,427      Lab 09/19/24  0528 09/18/24  1655   WBC 8.79 7.22    HEMOGLOBIN 8.2* 8.6*   HEMATOCRIT 24.7* 27.0*   PLATELETS 232 248   SODIUM 140 139   POTASSIUM 4.2 4.1   CHLORIDE 106 104   CO2 20.4* 20.6*   BUN 41* 42*   CREATININE 4.57* 4.71*   GLUCOSE 87 123*   CALCIUM 8.5* 8.8   TOTAL PROTEIN  --  6.3   ALBUMIN  --  3.4*   GLOBULIN  --  2.9     CT Chest Without Contrast Diagnostic    Result Date: 9/19/2024  CT CHEST WO CONTRAST DIAGNOSTIC Date of Exam: 9/18/2024 11:43 PM EDT Indication: Pleural effusion. Comparison: AP chest x-ray 9/18/2024, CT chest 7/20/2024. Technique: Axial CT images were obtained of the chest without contrast administration.  Reconstructed coronal and sagittal images were also obtained. Automated exposure control and iterative construction methods were used. Findings: Bilateral pleural effusions of increased compared to 7/20/2024 CT, now small to moderate on the right and moderate on the left. These are associated with partial bilateral lower lobe compressive atelectasis. There is interlobular septal thickening. Heart  size is enlarged. There are coronary artery calcifications. Mildly prominent mediastinal lymph nodes are stable. There is bilateral gynecomastia. Stable 1.1 cm left hepatic hypodense lesion is incompletely characterized without intravenous contrast but likely represents a cyst. No acute osseous abnormality is identified.     Impression: Impression: 1.Increased size of bilateral pleural effusions compared to 7/20/2024 CT, now small to moderate on the right and moderate on the left, with associated partial lower lobe compressive atelectasis. 2.Bilateral interlobular septal thickening, suggestive of pulmonary edema. Electronically Signed: Margarita Ramos  9/19/2024 12:07 AM EDT  Workstation ID: [id number]     Assessment & Plan   Assessment / Plan     Active Hospital Problems:  Active Hospital Problems    Diagnosis     **Pneumonia    Impression:   Acute hypoxemic respiratory failure  Bilateral pleural effusions, left greater than  right  Anemia  Decompensated diastolic congestive heart failure  Acute on chronic renal failure    Plan:   -Continue to wean O2 to maintain SpO2 greater than 90%  -CT chest reviewed revealing bilateral pleural effusions, left greater than right  -Will perform ultrasound-guided thoracentesis of left pleural effusion. I have discussed the risks of the procedure with the patient including pneumothorax, hemothorax, bleeding, hypoxia and death. The patient recognizes these findings, acknowledges these findings and is agreeable to the procedure.  -S/p left-sided thoracentesis, 1.4 L clear yellow pleural fluid drained, sent for cultures and cytology  -Postprocedure CXR pending, does not show pneumothorax postprocedure  -Will plan for right-sided thoracentesis tomorrow  -Continue cefepime and vancomycin for now.  May discontinue antibiotics tomorrow if cultures remain negative.  Patient has completed multiple courses of antibiotics recently.  Do not think he has any significant pneumonia just some compressive atelectasis  -MRSA, S pneumo, and Legionella negative.  Mycoplasma pending.  -Check procalcitonin  -Continue Lasix 60 mg IV twice daily  -Trend electrolytes and renal panel.  Replace electrolytes as needed.  -Nephrology on board.  Appreciate assistance.  -Start bronchopulmonary hygiene.  Encourage use of I-S and flutter valve.  -Encourage mobilization.  Out of bed to chair    Labs, microbiology, radiology, medications, and provider notes personally reviewed.  Discussed with primary services and bedside RN.     Thank you for this consult and allowing me to participate in the care of Mr. Reddy    Electronically signed by RENE Milligan, 09/19/24, 8:37 AM EDT.    This visit was performed by both a physician and an APC.  I personally evaluated and examined the patient.  I performed all aspects of MDM as documented.  I have reviewed and confirmed the accuracy of the patient's history as documented in this note and I  have reexamined the patient and the results are consistent with the previously documented exam.  I have updated the documentation as necessary. Electronically signed by Fred Leon MD, 09/19/24, 1:47 PM EDT.

## 2024-09-19 NOTE — CONSULTS
Patient Care Team:  Allison Villafana MD as PCP - General (Family Medicine)    Chief complaint: CKD5    Consults   Subjective     History of Present Illness  83yo with h/o CKD4 followed in clinic by our group.  He also has noted h/o HTN, PVD and AAA.  He had had presented to ER with abd pain and dyspnea along with increased LE edema.  He had noted pleural effusion in ER and admitted for possible PNA.  He is awake without any new complaints, still some mild dyspnea.    Review of Systems   Constitutional:  Negative for chills and fever.   Respiratory:  Negative for cough and shortness of breath.    Cardiovascular:  Positive for leg swelling. Negative for chest pain.   Gastrointestinal:  Negative for constipation, diarrhea, nausea and vomiting.   Genitourinary:  Negative for dysuria.   Musculoskeletal:  Negative for back pain.   Neurological:  Negative for headaches.        Past Medical History:   Diagnosis Date    Aortic aneurysm     Chronic renal failure     Hyperlipidemia     Hypertension     Monoclonal gammopathy     Peripheral vascular disease     Renal disorder     Skin cancer     TIA (transient ischemic attack)     Vitamin D deficiency    ,   Past Surgical History:   Procedure Laterality Date    TONSILLECTOMY     , History reviewed. No pertinent family history.,   Social History     Socioeconomic History    Marital status:    Tobacco Use    Smoking status: Former     Current packs/day: 0.00     Types: Cigarettes     Start date: 2016     Quit date: 2019     Years since quittin.1    Smokeless tobacco: Never   Vaping Use    Vaping status: Never Used   Substance and Sexual Activity    Alcohol use: Not Currently    Drug use: Never    Sexual activity: Defer     E-cigarette/Vaping    E-cigarette/Vaping Use Never User      E-cigarette/Vaping Substances     E-cigarette/Vaping Devices         ,   Medications Prior to Admission   Medication Sig Dispense Refill Last Dose    levoFLOXacin (LEVAQUIN) 500  MG tablet Take 1 tablet by mouth Daily.       amLODIPine (NORVASC) 10 MG tablet Take 1 tablet by mouth Daily.       azithromycin (ZITHROMAX) 250 MG tablet Indications: Pneumonia 4 tablet 0     calcitriol (ROCALTROL) 0.25 MCG capsule Take 1 capsule by mouth Daily for 30 days. 30 capsule 0     carvedilol (COREG) 3.125 MG tablet Take 1 tablet by mouth 2 (Two) Times a Day With Meals for 30 days. 60 tablet 0     famotidine (PEPCID) 20 MG tablet Take 1 tablet by mouth Every Morning.       furosemide (Lasix) 40 MG tablet Take 1 tablet by mouth Daily. 30 tablet 11     hydrALAZINE (APRESOLINE) 25 MG tablet Take 1 tablet by mouth 3 (Three) Times a Day.       Magnesium 250 MG tablet Take 1 tablet by mouth Every Morning.       pravastatin (PRAVACHOL) 40 MG tablet Take 1 tablet by mouth Daily.       tamsulosin (FLOMAX) 0.4 MG capsule 24 hr capsule Take 1 capsule by mouth Daily. 10 capsule 0    , Scheduled Meds:  cefepime, 2,000 mg, Intravenous, Q24H  furosemide, 60 mg, Intravenous, Q12H  heparin (porcine), 5,000 Units, Subcutaneous, Q12H  sodium chloride, 10 mL, Intravenous, Q12H  Vancomycin Pharmacy Intermittent/Pulse Dosing, , Does not apply, Daily   , Continuous Infusions:  Pharmacy to dose vancomycin,    , PRN Meds:    senna-docusate sodium **AND** polyethylene glycol **AND** bisacodyl **AND** bisacodyl    HYDROcodone-acetaminophen    nitroglycerin    ondansetron    Pharmacy to dose vancomycin    sodium chloride    sodium chloride    sodium chloride, and Allergies:  Aspirin    Objective     Vital Signs  Temp:  [97.7 °F (36.5 °C)-98 °F (36.7 °C)] 97.7 °F (36.5 °C)  Heart Rate:  [66-84] 73  Resp:  [16-19] 18  BP: (143-179)/(56-87) 158/64    No intake/output data recorded.  I/O last 3 completed shifts:  In: -   Out: 250 [Urine:250]    Physical Exam  Constitutional:       Appearance: Normal appearance.   HENT:      Head: Normocephalic.      Nose: Nose normal.      Mouth/Throat:      Mouth: Mucous membranes are moist.   Eyes:      " General: No scleral icterus.     Pupils: Pupils are equal, round, and reactive to light.   Cardiovascular:      Rate and Rhythm: Normal rate and regular rhythm.      Pulses: Normal pulses.      Heart sounds: Normal heart sounds.   Pulmonary:      Effort: Pulmonary effort is normal.      Breath sounds: Normal breath sounds.   Abdominal:      General: Abdomen is flat.      Palpations: Abdomen is soft.   Musculoskeletal:      Cervical back: Normal range of motion.      Right lower leg: Edema present.      Left lower leg: Edema present.   Neurological:      Mental Status: He is alert.         Results Review:    I reviewed the patient's new clinical results.  I reviewed the patient's new imaging results and agree with the interpretation.  I reviewed the patient's other test results and agree with the interpretation    WBC WBC   Date Value Ref Range Status   09/19/2024 8.79 3.40 - 10.80 10*3/mm3 Final   09/18/2024 7.22 3.40 - 10.80 10*3/mm3 Final      HGB Hemoglobin   Date Value Ref Range Status   09/19/2024 8.2 (L) 13.0 - 17.7 g/dL Final   09/18/2024 8.6 (L) 13.0 - 17.7 g/dL Final      HCT Hematocrit   Date Value Ref Range Status   09/19/2024 24.7 (L) 37.5 - 51.0 % Final   09/18/2024 27.0 (L) 37.5 - 51.0 % Final      Platlets No results found for: \"LABPLAT\"   MCV MCV   Date Value Ref Range Status   09/19/2024 94.3 79.0 - 97.0 fL Final   09/18/2024 94.7 79.0 - 97.0 fL Final          Sodium Sodium   Date Value Ref Range Status   09/19/2024 140 136 - 145 mmol/L Final   09/18/2024 139 136 - 145 mmol/L Final      Potassium Potassium   Date Value Ref Range Status   09/19/2024 4.2 3.5 - 5.2 mmol/L Final   09/18/2024 4.1 3.5 - 5.2 mmol/L Final      Chloride Chloride   Date Value Ref Range Status   09/19/2024 106 98 - 107 mmol/L Final   09/18/2024 104 98 - 107 mmol/L Final      CO2 CO2   Date Value Ref Range Status   09/19/2024 20.4 (L) 22.0 - 29.0 mmol/L Final   09/18/2024 20.6 (L) 22.0 - 29.0 mmol/L Final      BUN BUN   Date " "Value Ref Range Status   09/19/2024 41 (H) 8 - 23 mg/dL Final   09/18/2024 42 (H) 8 - 23 mg/dL Final      Creatinine Creatinine   Date Value Ref Range Status   09/19/2024 4.57 (H) 0.76 - 1.27 mg/dL Final   09/18/2024 4.71 (H) 0.76 - 1.27 mg/dL Final      Calcium Calcium   Date Value Ref Range Status   09/19/2024 8.5 (L) 8.6 - 10.5 mg/dL Final   09/18/2024 8.8 8.6 - 10.5 mg/dL Final      PO4 No results found for: \"CAPO4\"   Albumin Albumin   Date Value Ref Range Status   09/18/2024 3.4 (L) 3.5 - 5.2 g/dL Final      Magnesium No results found for: \"MG\"   Uric Acid No results found for: \"URICACID\"         Assessment & Plan       Pneumonia      Assessment & Plan    CKD5-  He has had creatinine of 4.0-4.4 more recently.  CKD appears from HTN nephrosclerosis.  Atrophic kidneys on previous CT.  Referred to \[facility]\"" last office appt.  Also had lasix reduced to 20mg/day.  No acute indication for dialysis, discussed with him very close.  Continue IV lasix 60mg bid for now and monitor.  Looks like he has appt with vascular for AVF. Next month.  HTN-  continue home meds.  Would avoid acei/arb with advanced ckd.  Anemia- hgb at 8.2.  recent tsat at 26%.  Will give epogen now.  Proteinuria-  had noted 3+ on UA,  Had no monoclonal protein on urine GRETTA and serum GRETTA from 7/24.  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    PNA-  abx per primary.    I discussed the patients findings and my recommendations with patient, nursing staff, and primary care team    Santiago Whiteside MD  09/19/24  07:55 EDT          "

## 2024-09-19 NOTE — PLAN OF CARE
Goal Outcome Evaluation:   VSS. Left thoracentesis this morning, 1400 ML removed. Appetite poor. No complaints of pain. Continue with current POC. Plan for right thoracentesis tomorrow.

## 2024-09-19 NOTE — PLAN OF CARE
Problem: Gas Exchange Impaired  Goal: Optimal Gas Exchange  Outcome: Ongoing, Progressing     Problem: Adjustment to Illness (Heart Failure)  Goal: Optimal Coping  Outcome: Ongoing, Progressing     Problem: Fluid Imbalance (Heart Failure)  Goal: Fluid Balance  Outcome: Ongoing, Progressing     Problem: Respiratory Compromise (Heart Failure)  Goal: Effective Oxygenation and Ventilation  Outcome: Ongoing, Progressing   Goal Outcome Evaluation:

## 2024-09-20 ENCOUNTER — APPOINTMENT (OUTPATIENT)
Dept: GENERAL RADIOLOGY | Facility: HOSPITAL | Age: 84
DRG: 280 | End: 2024-09-20
Payer: MEDICARE

## 2024-09-20 PROBLEM — K21.9 GASTROESOPHAGEAL REFLUX DISEASE: Status: ACTIVE | Noted: 2021-01-06

## 2024-09-20 PROBLEM — N40.0 BENIGN PROSTATIC HYPERPLASIA: Status: ACTIVE | Noted: 2023-07-23

## 2024-09-20 PROBLEM — J18.9 PNEUMONIA: Status: RESOLVED | Noted: 2024-09-18 | Resolved: 2024-09-20

## 2024-09-20 LAB
ALBUMIN SERPL-MCNC: 3.4 G/DL (ref 3.5–5.2)
ALBUMIN/GLOB SERPL: 1.2 G/DL
ALP SERPL-CCNC: 82 U/L (ref 39–117)
ALT SERPL W P-5'-P-CCNC: 8 U/L (ref 1–41)
ANION GAP SERPL CALCULATED.3IONS-SCNC: 14.3 MMOL/L (ref 5–15)
AST SERPL-CCNC: 16 U/L (ref 1–40)
BASOPHILS # BLD AUTO: 0.05 10*3/MM3 (ref 0–0.2)
BASOPHILS NFR BLD AUTO: 0.5 % (ref 0–1.5)
BILIRUB CONJ SERPL-MCNC: 0.1 MG/DL (ref 0–0.3)
BILIRUB SERPL-MCNC: 0.2 MG/DL (ref 0–1.2)
BUN SERPL-MCNC: 44 MG/DL (ref 8–23)
BUN/CREAT SERPL: 8.8 (ref 7–25)
CALCIUM SPEC-SCNC: 9 MG/DL (ref 8.6–10.5)
CHLORIDE SERPL-SCNC: 105 MMOL/L (ref 98–107)
CO2 SERPL-SCNC: 20.7 MMOL/L (ref 22–29)
CREAT SERPL-MCNC: 5.01 MG/DL (ref 0.76–1.27)
CYTO UR: NORMAL
DEPRECATED RDW RBC AUTO: 46.3 FL (ref 37–54)
EGFRCR SERPLBLD CKD-EPI 2021: 10.8 ML/MIN/1.73
EOSINOPHIL # BLD AUTO: 0.02 10*3/MM3 (ref 0–0.4)
EOSINOPHIL NFR BLD AUTO: 0.2 % (ref 0.3–6.2)
ERYTHROCYTE [DISTWIDTH] IN BLOOD BY AUTOMATED COUNT: 13.5 % (ref 12.3–15.4)
GLOBULIN UR ELPH-MCNC: 2.9 GM/DL
GLUCOSE SERPL-MCNC: 104 MG/DL (ref 65–99)
HCT VFR BLD AUTO: 27.5 % (ref 37.5–51)
HGB BLD-MCNC: 8.9 G/DL (ref 13–17.7)
IMM GRANULOCYTES # BLD AUTO: 0.04 10*3/MM3 (ref 0–0.05)
IMM GRANULOCYTES NFR BLD AUTO: 0.4 % (ref 0–0.5)
LAB AP CASE REPORT: NORMAL
LAB AP CLINICAL INFORMATION: NORMAL
LYMPHOCYTES # BLD AUTO: 0.46 10*3/MM3 (ref 0.7–3.1)
LYMPHOCYTES NFR BLD AUTO: 4.5 % (ref 19.6–45.3)
MAGNESIUM SERPL-MCNC: 2.3 MG/DL (ref 1.6–2.4)
MCH RBC QN AUTO: 30.5 PG (ref 26.6–33)
MCHC RBC AUTO-ENTMCNC: 32.4 G/DL (ref 31.5–35.7)
MCV RBC AUTO: 94.2 FL (ref 79–97)
MONOCYTES # BLD AUTO: 0.54 10*3/MM3 (ref 0.1–0.9)
MONOCYTES NFR BLD AUTO: 5.2 % (ref 5–12)
NEUTROPHILS NFR BLD AUTO: 89.2 % (ref 42.7–76)
NEUTROPHILS NFR BLD AUTO: 9.18 10*3/MM3 (ref 1.7–7)
NRBC BLD AUTO-RTO: 0 /100 WBC (ref 0–0.2)
PATH REPORT.FINAL DX SPEC: NORMAL
PATH REPORT.GROSS SPEC: NORMAL
PHOSPHATE SERPL-MCNC: 4.3 MG/DL (ref 2.5–4.5)
PLATELET # BLD AUTO: 269 10*3/MM3 (ref 140–450)
PMV BLD AUTO: 9.3 FL (ref 6–12)
POTASSIUM SERPL-SCNC: 4.1 MMOL/L (ref 3.5–5.2)
PROT SERPL-MCNC: 6.3 G/DL (ref 6–8.5)
PTH-INTACT SERPL-MCNC: 124.4 PG/ML (ref 15–65)
RBC # BLD AUTO: 2.92 10*6/MM3 (ref 4.14–5.8)
SODIUM SERPL-SCNC: 140 MMOL/L (ref 136–145)
VANCOMYCIN SERPL-MCNC: 14.3 MCG/ML (ref 5–40)
WBC NRBC COR # BLD AUTO: 10.29 10*3/MM3 (ref 3.4–10.8)

## 2024-09-20 PROCEDURE — 99232 SBSQ HOSP IP/OBS MODERATE 35: CPT | Performed by: FAMILY MEDICINE

## 2024-09-20 PROCEDURE — 80202 ASSAY OF VANCOMYCIN: CPT | Performed by: FAMILY MEDICINE

## 2024-09-20 PROCEDURE — 83970 ASSAY OF PARATHORMONE: CPT | Performed by: INTERNAL MEDICINE

## 2024-09-20 PROCEDURE — 25010000002 FUROSEMIDE PER 20 MG: Performed by: FAMILY MEDICINE

## 2024-09-20 PROCEDURE — 94760 N-INVAS EAR/PLS OXIMETRY 1: CPT

## 2024-09-20 PROCEDURE — 85025 COMPLETE CBC W/AUTO DIFF WBC: CPT | Performed by: FAMILY MEDICINE

## 2024-09-20 PROCEDURE — 25010000002 HEPARIN (PORCINE) PER 1000 UNITS: Performed by: FAMILY MEDICINE

## 2024-09-20 PROCEDURE — 71045 X-RAY EXAM CHEST 1 VIEW: CPT

## 2024-09-20 PROCEDURE — 94618 PULMONARY STRESS TESTING: CPT

## 2024-09-20 PROCEDURE — 84100 ASSAY OF PHOSPHORUS: CPT | Performed by: FAMILY MEDICINE

## 2024-09-20 PROCEDURE — 94761 N-INVAS EAR/PLS OXIMETRY MLT: CPT

## 2024-09-20 PROCEDURE — 99232 SBSQ HOSP IP/OBS MODERATE 35: CPT | Performed by: INTERNAL MEDICINE

## 2024-09-20 PROCEDURE — 80053 COMPREHEN METABOLIC PANEL: CPT | Performed by: INTERNAL MEDICINE

## 2024-09-20 PROCEDURE — 25010000002 VANCOMYCIN PER 500 MG: Performed by: FAMILY MEDICINE

## 2024-09-20 PROCEDURE — 83735 ASSAY OF MAGNESIUM: CPT | Performed by: FAMILY MEDICINE

## 2024-09-20 PROCEDURE — 94799 UNLISTED PULMONARY SVC/PX: CPT

## 2024-09-20 PROCEDURE — 82248 BILIRUBIN DIRECT: CPT | Performed by: FAMILY MEDICINE

## 2024-09-20 RX ORDER — DOXYCYCLINE 100 MG/1
100 CAPSULE ORAL 2 TIMES DAILY
Qty: 10 CAPSULE | Refills: 0 | Status: SHIPPED | OUTPATIENT
Start: 2024-09-20 | End: 2024-09-25

## 2024-09-20 RX ORDER — TEMAZEPAM 15 MG/1
15 CAPSULE ORAL NIGHTLY PRN
Status: DISCONTINUED | OUTPATIENT
Start: 2024-09-20 | End: 2024-09-21 | Stop reason: HOSPADM

## 2024-09-20 RX ORDER — CALCITRIOL 0.25 UG/1
0.25 CAPSULE, LIQUID FILLED ORAL 3 TIMES WEEKLY
Status: DISCONTINUED | OUTPATIENT
Start: 2024-09-20 | End: 2024-09-21 | Stop reason: HOSPADM

## 2024-09-20 RX ORDER — FUROSEMIDE 40 MG
40 TABLET ORAL DAILY
Qty: 30 TABLET | Refills: 0 | Status: SHIPPED | OUTPATIENT
Start: 2024-09-20 | End: 2024-10-20

## 2024-09-20 RX ADMIN — AMLODIPINE BESYLATE 10 MG: 10 TABLET ORAL at 10:14

## 2024-09-20 RX ADMIN — Medication 10 ML: at 09:28

## 2024-09-20 RX ADMIN — HYDRALAZINE HYDROCHLORIDE 25 MG: 25 TABLET ORAL at 10:14

## 2024-09-20 RX ADMIN — HEPARIN SODIUM 5000 UNITS: 5000 INJECTION INTRAVENOUS; SUBCUTANEOUS at 22:09

## 2024-09-20 RX ADMIN — Medication 10 ML: at 22:09

## 2024-09-20 RX ADMIN — VANCOMYCIN HYDROCHLORIDE 500 MG: 500 INJECTION, POWDER, LYOPHILIZED, FOR SOLUTION INTRAVENOUS at 10:10

## 2024-09-20 RX ADMIN — HYDRALAZINE HYDROCHLORIDE 25 MG: 25 TABLET ORAL at 22:08

## 2024-09-20 RX ADMIN — TAMSULOSIN HYDROCHLORIDE 0.4 MG: 0.4 CAPSULE ORAL at 10:14

## 2024-09-20 RX ADMIN — PRAVASTATIN SODIUM 40 MG: 20 TABLET ORAL at 22:08

## 2024-09-20 RX ADMIN — Medication: at 09:28

## 2024-09-20 RX ADMIN — FUROSEMIDE 60 MG: 10 INJECTION, SOLUTION INTRAMUSCULAR; INTRAVENOUS at 05:33

## 2024-09-20 RX ADMIN — HYDRALAZINE HYDROCHLORIDE 25 MG: 25 TABLET ORAL at 16:23

## 2024-09-20 RX ADMIN — HEPARIN SODIUM 5000 UNITS: 5000 INJECTION INTRAVENOUS; SUBCUTANEOUS at 10:14

## 2024-09-20 RX ADMIN — FUROSEMIDE 60 MG: 10 INJECTION, SOLUTION INTRAMUSCULAR; INTRAVENOUS at 18:45

## 2024-09-20 RX ADMIN — HYDROCODONE BITARTRATE AND ACETAMINOPHEN 1 TABLET: 5; 325 TABLET ORAL at 15:39

## 2024-09-20 RX ADMIN — CALCITRIOL CAPSULES 0.25 MCG 0.25 MCG: 0.25 CAPSULE ORAL at 16:23

## 2024-09-20 NOTE — PROGRESS NOTES
[facility]     Nephrology Progress Note      Patient Name: Huy Reddy  : 1940  MRN: [id number]  Primary Care Physician:  Allison Villafana MD  Date of admission: 2024    Subjective   Subjective     Interval History:  Patient Reports feeling well.  Blood pressure is on high side.  Breathing well.  550 cc urine output recorded.  Office record reviewed, seen by Dr. Mccrary as well as nurse practitioner in our office.  Patient has been resisting the idea of dialysis preparation but wants to do dialysis when the time comes.  Did not eat very well.    Review of Systems   All systems were reviewed and negative except for: What is mentioned above.    Objective   Objective     Vitals:   Temp:  [97.9 °F (36.6 °C)-98.6 °F (37 °C)] 98.1 °F (36.7 °C)  Heart Rate:  [69-86] 72  Resp:  [16-18] 18  BP: (150-168)/(53-60) 160/58  Flow (L/min):  [2] 2  Physical Exam:   Constitutional: Awake, alert, no distress, pale   Eyes: sclerae anicteric, no conjunctival injection   HENT: mucous membranes moist   Neck: Supple, no thyromegaly, no lymphadenopathy, trachea midline, No JVD   Respiratory: Decreased to auscultation bilaterally, nonlabored respirations    Cardiovascular: RRR, no murmurs, rubs, or gallops.   Gastrointestinal: Positive bowel sounds, soft, nontender, nondistended   Musculoskeletal: 1+ edema, no clubbing or cyanosis   Psychiatric: Appropriate affect, cooperative   Neurologic: Oriented x 3, moving all extremities, Cranial Nerves grossly intact, speech clear   Skin: warm and dry, no rashes     Result Review    Result Reviewed:  I have personally reviewed the results from the time of this admission to 2024 14:19 EDT and agree with these findings:  [x]  Laboratory  []  Microbiology  [x]  Radiology  []  EKG/Telemetry   []  Cardiology/Vascular   []  Pathology  [x]  Old records  []  Other:        Lab 24  0452 24  0528 24  1655   SODIUM 140 140 139   POTASSIUM 4.1 4.2 4.1   CHLORIDE 105 106  104   CO2 20.7* 20.4* 20.6*   BUN 44* 41* 42*   CREATININE 5.01* 4.57* 4.71*   GLUCOSE 104* 87 123*   EGFR 10.8* 12.1* 11.6*   ANION GAP 14.3 13.6 14.4   MAGNESIUM 2.3  --   --    PHOSPHORUS 4.3  --   --            Most notable findings include: Labs as above.  , hemoglobin 8.9.  Urinalysis bland with large protein.    Assessment & Plan   Assessment / Plan       Active Hospital Problems:  There are no active hospital problems to display for this patient.  Acute kidney injury  Chronic kidney disease stage V  Anemia    Assessment and Plan:    Mild acute kidney injury- multifactorial, creatinine up to 5.  No uremic symptoms.  Volume status mildly generous but better.  No urgent need to initiate dialysis.    CKD5-  He has had creatinine of 4.0-4.4 more recently.  CKD appears from HTN nephrosclerosis.  Atrophic kidneys on previous CT.  Referred to [facility] last office appt. reluctant to pursue dialysis preparation but wants to pursue dialysis when needed.  Long discussion with him regarding attended [facility] program and getting an AV fistula as soon as possible.  Currently on IV Lasix, volume status improved respiratory status improved after left thoracentesis.  Hoping to go home today.    HTN-  check aldosterone/renin ratio continue home meds.  Would avoid acei/arb with advanced ckd.  Low-sodium diet is paramount.  Anemia- hgb at 8.2.  recent tsat at 26%.  Status post Epogen 20,000 units subcu x 1 on 9/19/2024.  Proteinuria-  had noted 3+ on UA,  Had no monoclonal protein on urine GRETTA and serum GRETTA from 7/24.  Check UPC.  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  Would continue this dose after discharge.  H/O CVA  PVD  Volume Overload-  BNP elevated with noted effusions.    PNA-  abx per primary.    Discussed with wife.  If discharged, follow-up in the office in 1 week.  Will keep him on the above dose of calcitriol and let him go home on Lasix 80 mg p.o. daily.  Will need  low-sodium diet and daily weight.  Please call with any questions!      Electronically signed by Babar Constantino MD, 9/20/2024, 14:19 EDT.

## 2024-09-20 NOTE — PROGRESS NOTES
[facility]   Hospitalist Progress Note  Date: 2024  Patient Name: Huy Reddy  : 1940  MRN: [id number]  Date of admission: 2024      Subjective   Subjective     Chief complaint: Lower abdominal pain, feet swelling,    Summary:  84-year-old male with history of aortic aneurysm, CKD stage IV, dyslipidemia, chronic diastolic CHF, hypertension, monoclonal gammopathy, PVD, TIA, hospitalized on 2024, with chief complaint of lower abdominal pain and feet swelling found to have volume overload, with recent lung infections, found to have moderately sized pleural effusion, pulmonary consulted for thoracentesis, left-sided thoracentesis, chest CT shows significant sized pleural effusion.  Placed on diuretics and empiric antibiotics.    Interval follow-up: Seen and examined this morning, no acute distress, overnight had some sundowning, was reoriented, wife at the bedside this morning.  Doing better from a breathing standpoint, was asking to go home, pulmonary attempted right-sided thoracentesis, postprocedure had a small right apical pneumothorax.  So far cultures are negative.  White blood cell count 10,000, hemoglobin 8.9, creatinine 5.01, BUN 44, potassium 4.1, sodium 140.  Patient cannot go home until we see improvement in right-sided pneumothorax.    Review of systems:  All systems reviewed and negative except for weakness, fatigue, shortness of breath with exertional activity, sundowning at night    Objective   Objective     Vitals:   Temp:  [97.9 °F (36.6 °C)-98.6 °F (37 °C)] 98.1 °F (36.7 °C)  Heart Rate:  [69-86] 72  Resp:  [16-18] 18  BP: (150-168)/(53-60) 160/58  Flow (L/min):  [2] 2  Physical Exam      Constitutional: Awake, alert, no acute distress   Eyes: Pupils equal, sclerae anicteric, no conjunctival injection   HENT: NCAT, mucous membranes moist   Neck: Supple, full range of motion   Respiratory: Diminished and coarse to auscultation bilaterally, nonlabored respirations     Cardiovascular: RRR, no murmurs, rubs, or gallops, palpable pedal pulses bilaterally   Gastrointestinal: Positive bowel sounds, soft, nontender, nondistended   Musculoskeletal: Positive bilateral ankle edema, no clubbing or cyanosis to extremities   Psychiatric: Appropriate affect, cooperative   Neurologic: Oriented x 3, strength symmetric in all extremities, Cranial Nerves grossly intact to confrontation, speech clear   Skin: No rashes visible on exposed skin    Result Review    Result Review:  I have personally reviewed the pertinent results from the past 24 hours to 9/20/2024 13:42 EDT and agree with these findings:  [x]  Laboratory   CBC          9/18/2024    16:55 9/19/2024    05:28 9/20/2024    04:52   CBC   WBC 7.22  8.79  10.29    RBC 2.85  2.62  2.92    Hemoglobin 8.6  8.2  8.9    Hematocrit 27.0  24.7  27.5    MCV 94.7  94.3  94.2    MCH 30.2  31.3  30.5    MCHC 31.9  33.2  32.4    RDW 13.5  13.5  13.5    Platelets 248  232  269      BMP          9/18/2024    16:55 9/19/2024    05:28 9/20/2024    04:52   BMP   BUN 42  41  44    Creatinine 4.71  4.57  5.01    Sodium 139  140  140    Potassium 4.1  4.2  4.1    Chloride 104  106  105    CO2 20.6  20.4  20.7    Calcium 8.8  8.5  9.0      LIVER FUNCTION TESTS:      Lab 09/20/24  0452 09/18/24  1655   TOTAL PROTEIN 6.3 6.3   ALBUMIN 3.4* 3.4*   GLOBULIN 2.9 2.9   ALT (SGPT) 8 9   AST (SGOT) 16 15   BILIRUBIN 0.2 0.2   BILIRUBIN DIRECT 0.1  --    ALK PHOS 82 79       [x]  Microbiology   Microbiology Results (last 10 days)       Procedure Component Value - Date/Time    Body Fluid Culture - Body Fluid, Pleural Cavity [[id number]] Collected: 09/19/24 1004    Lab Status: Preliminary result Specimen: Body Fluid from Pleural Cavity Updated: 09/20/24 0729     Body Fluid Culture No growth     Gram Stain No organisms seen    S. Pneumo Ag Urine or CSF - Urine, Urine, Clean Catch [[id number]]  (Normal) Collected: 09/19/24 0454    Lab Status: Final result Specimen: Urine,  Clean Catch Updated: 09/19/24 0622     Strep Pneumo Ag Negative    Legionella Antigen, Urine - Urine, Urine, Clean Catch [[id number]]  (Normal) Collected: 09/19/24 0454    Lab Status: Final result Specimen: Urine, Clean Catch Updated: 09/19/24 0621     LEGIONELLA ANTIGEN, URINE Negative    MRSA Screen, PCR (Inpatient) - Swab, Nares [[id number]]  (Normal) Collected: 09/19/24 0447    Lab Status: Final result Specimen: Swab from Nares Updated: 09/19/24 0643     MRSA PCR No MRSA Detected    Narrative:      The negative predictive value of this diagnostic test is high and should only be used to consider de-escalating anti-MRSA therapy. A positive result may indicate colonization with MRSA and must be correlated clinically.    Blood Culture - Blood, Arm, Right [[id number]]  (Normal) Collected: 09/18/24 2300    Lab Status: Preliminary result Specimen: Blood from Arm, Right Updated: 09/19/24 2315     Blood Culture No growth at 24 hours    Blood Culture - Blood, Hand, Left [[id number]]  (Normal) Collected: 09/18/24 2300    Lab Status: Preliminary result Specimen: Blood from Hand, Left Updated: 09/19/24 2315     Blood Culture No growth at 24 hours    COVID-19, FLU A/B, RSV PCR 1 HR TAT - Swab, Nasopharynx [[id number]]  (Normal) Collected: 09/18/24 1923    Lab Status: Final result Specimen: Swab from Nasopharynx Updated: 09/18/24 2009     COVID19 Not Detected     Influenza A PCR Not Detected     Influenza B PCR Not Detected     RSV, PCR Not Detected    Narrative:      Fact sheet for providers: https://www.fda.gov/media/054154/download    Fact sheet for patients: https://www.fda.gov/media/438320/download    Test performed by PCR.              [x]  Radiology XR Chest 1 View    Result Date: 9/20/2024  Impression: 1. New small right apical pneumothorax. 2. Bilateral pleural effusions with left base consolidation, cannot exclude superimposed pneumonia. Electronically Signed: Duncan Goldman MD  9/20/2024 11:31 AM EDT  Workstation ID:  [id number]    XR Chest 1 View    Result Date: 9/19/2024  Impression: 1. Decrease in size of the left-sided pleural effusion status post thoracentesis. 2. Layering trace bilateral pleural effusions and associated compressive atelectasis. Electronically Signed: Bebeto Stanford  9/19/2024 11:48 AM EDT  Workstation ID: [id number]    CT Chest Without Contrast Diagnostic    Result Date: 9/19/2024  Impression: 1.Increased size of bilateral pleural effusions compared to 7/20/2024 CT, now small to moderate on the right and moderate on the left, with associated partial lower lobe compressive atelectasis. 2.Bilateral interlobular septal thickening, suggestive of pulmonary edema. Electronically Signed: Margarita Ramos  9/19/2024 12:07 AM EDT  Workstation ID: [id number]    XR Chest 1 View    Result Date: 9/18/2024  Impression: 1.Moderate left and trace right pleural effusions with bibasilar atelectasis. Electronically Signed: Francisco Arellano MD  9/18/2024 5:32 PM EDT  Workstation ID: [id number]       []  EKG/Telemetry   No orders to display       []  Cardiology/Vascular   []  Pathology  [x]  Old records  []  Other:    Assessment & Plan   Assessment / Plan     Assessment/Plan:  Assessment:  Acute decompensation of chronic diastolic CHF  SERJIO with CKD stage IV  Elevated troponins likely type II MI NSTEMI due to respiratory demands  Recent left-sided pneumonia  Right-sided pneumothorax small apical  CKD stage IV  Dyslipidemia  Essential hypertension  Monoclonal gammopathy  PVD  History of TIA    Plan:  Labs and imaging reviewed  New right-sided apical pneumothorax post right-sided thoracentesis, will need to be monitored closely  Okay to stop cefepime and vancomycin and monitor  Temazepam 15 mg at night as needed for sleep  Continue Lasix 60 mg IV twice daily  Pulmonary consulted discussed with APRN for Dr. Leon  Continue hydralazine 25 mg 3 times daily  Continue Pravachol 40 mg nightly  Continue tamsulosin 0.4 mg daily  Continue  amlodipine 10 mg daily  Strict I's and O's  Daily weights  A.m. labs  Full code  DVT prophylaxis with heparin  Clinical course dictate further management  Discussed with nurse at the bedside    VTE Prophylaxis:  Pharmacologic VTE prophylaxis orders are present.        CODE STATUS:   Code Status (Patient has no pulse and is not breathing): CPR (Attempt to Resuscitate)  Medical Interventions (Patient has pulse or is breathing): Full Support        Electronically signed by Melody Butler MD, 9/20/2024, 13:42 EDT.    Portions of this documentation were transcribed electronically from a voice recognition software.  I confirm all data accurately represents the service(s) I performed at today's visit.

## 2024-09-20 NOTE — PLAN OF CARE
Problem: Gas Exchange Impaired  Goal: Optimal Gas Exchange  Outcome: Ongoing, Progressing     Problem: Fluid Imbalance (Heart Failure)  Goal: Fluid Balance  Outcome: Ongoing, Progressing     Problem: Respiratory Compromise (Heart Failure)  Goal: Effective Oxygenation and Ventilation  Outcome: Ongoing, Progressing     Problem: Adult Inpatient Plan of Care  Goal: Plan of Care Review  Outcome: Ongoing, Progressing   Goal Outcome Evaluation:     Patient A&Ox4. Did get out of bed overnight and security was called to assist patient back to his room. Read separate nurses note regarding incident. Patient will need sleep aide to help him sleep at night per daughter Candida. She states he has done this before and his nights/days will get mixed up quickly when in hospitals.

## 2024-09-20 NOTE — PLAN OF CARE
Goal Outcome Evaluation:      VSS. Patient with intermittent confusion throughout the shift. Thoracentesis attempted today with o success, CXR reveals Pneumothorax. Discharge cancelled.  Family remains at bedside.

## 2024-09-20 NOTE — PROGRESS NOTES
Pulmonary / Critical Care Progress Note      Patient Name: Huy Reddy  : 1940  MRN: [id number]  Primary Care Physician:  Allison Villafana MD  Date of admission: 2024    Subjective   Subjective   Follow-up for bilateral pleural effusions    Overnight, confused and combative.    This morning,  Lying in bed  Remains on room air  Mentation much improved  S/p left thoracentesis yesterday  Transudative per lights criteria  Dyspnea improved  Overall feeling better  No fever or chills  Diuresing well      Objective   Objective     Vitals:   Temp:  [97.9 °F (36.6 °C)-98.6 °F (37 °C)] 97.9 °F (36.6 °C)  Heart Rate:  [69-86] 76  Resp:  [16-18] 18  BP: (150-168)/(53-66) 168/53  Flow (L/min):  [2] 2    Physical Exam   Vital Signs Reviewed   General: Pleasant, elderly male, Alert, NAD, resting in bed.    Chest:  good aeration, clear to auscultation bilaterally, no work of breathing noted  CV: regular rate and rhythm regular  EXT:  no clubbing, no cyanosis, no edema  Neuro:  A&Ox3, moving all 4 extremities spontaneously  Skin: No rashes or lesions noted      Result Review    Result Review:  I have personally reviewed the results from the time of this admission to 2024 09:56 EDT and agree with these findings:  [x]  Laboratory  [x]  Microbiology  [x]  Radiology  [x]  EKG/Telemetry   []  Cardiology/Vascular   []  Pathology  []  Old records  []  Other:  Most notable findings include:         Lab 24  0452 24  0528 24  1655   WBC 10.29 8.79 7.22   HEMOGLOBIN 8.9* 8.2* 8.6*   HEMATOCRIT 27.5* 24.7* 27.0*   PLATELETS 269 232 248   SODIUM 140 140 139   POTASSIUM 4.1 4.2 4.1   CHLORIDE 105 106 104   CO2 20.7* 20.4* 20.6*   BUN 44* 41* 42*   CREATININE 5.01* 4.57* 4.71*   GLUCOSE 104* 87 123*   CALCIUM 9.0 8.5* 8.8   PHOSPHORUS 4.3  --   --    TOTAL PROTEIN 6.3  --  6.3   ALBUMIN 3.4*  --  3.4*   GLOBULIN 2.9  --  2.9     CT Chest Without Contrast Diagnostic    Result Date: 2024  CT CHEST WO  CONTRAST DIAGNOSTIC Date of Exam: 9/18/2024 11:43 PM EDT Indication: Pleural effusion. Comparison: AP chest x-ray 9/18/2024, CT chest 7/20/2024. Technique: Axial CT images were obtained of the chest without contrast administration.  Reconstructed coronal and sagittal images were also obtained. Automated exposure control and iterative construction methods were used. Findings: Bilateral pleural effusions of increased compared to 7/20/2024 CT, now small to moderate on the right and moderate on the left. These are associated with partial bilateral lower lobe compressive atelectasis. There is interlobular septal thickening. Heart  size is enlarged. There are coronary artery calcifications. Mildly prominent mediastinal lymph nodes are stable. There is bilateral gynecomastia. Stable 1.1 cm left hepatic hypodense lesion is incompletely characterized without intravenous contrast but likely represents a cyst. No acute osseous abnormality is identified.     Impression: Impression: 1.Increased size of bilateral pleural effusions compared to 7/20/2024 CT, now small to moderate on the right and moderate on the left, with associated partial lower lobe compressive atelectasis. 2.Bilateral interlobular septal thickening, suggestive of pulmonary edema. Electronically Signed: Margarita Ramos  9/19/2024 12:07 AM EDT  Workstation ID: [id number]     Assessment & Plan   Assessment / Plan   Impression:  Acute hypoxemic respiratory failure  Bilateral pleural effusions, left greater than right  Anemia  Decompensated diastolic congestive heart failure  Acute on chronic renal failure    Plan:  -Continue to wean O2 to maintain SpO2 greater than 90%  -Completed 6 MWT.  No supplemental O2 required at discharge.  -CT chest reviewed revealing bilateral pleural effusions, left greater than right  -Will perform ultrasound-guided thoracentesis of right pleural effusion today. I have discussed the risks of the procedure with the patient including  pneumothorax, hemothorax, bleeding, hypoxia and death. The patient recognizes these findings, acknowledges these findings and is agreeable to the procedure.  -S/p attempted right-sided thoracentesis.  2 mL bloody fluid drained.  Thoracentesis aborted.  Postprocedure CXR with new small right apical pneumothorax.  -S/p left-sided thoracentesis, 1.4 L clear yellow pleural fluid drained, transudative per lights criteria, cultures and cytology pending.  Right thoracentesis attempted but unsuccessful as the patient does not have much fluid left postthoracentesis attempt chest x-ray showed small pneumothorax patient is stable and denies any shortness of breath  -Repeat CXR in a.m.  -Okay to discontinue antibiotics from pulmonary standpoint.  Micro remains negative.  Procalcitonin negative.  Patient has completed multiple courses of antibiotics recently.  Do not think he has any significant pneumonia just some compressive atelectasis  -Continue Lasix 60 mg IV twice daily  -Trend electrolytes and renal panel.  Replace electrolytes as needed.  -Nephrology on board.  Appreciate assistance.  -Continue bronchopulmonary hygiene.  Encourage use of I-S and flutter valve.  -Encourage mobilization.  Out of bed to chair  -Follow-up with pulmonology in 1 to 2 weeks         Labs, microbiology, radiology, medications, and provider notes personally reviewed.  Discussed with primary services and bedside RN.    VTE Prophylaxis:  Pharmacologic VTE prophylaxis orders are present.        CODE STATUS:   Code Status (Patient has no pulse and is not breathing): CPR (Attempt to Resuscitate)  Medical Interventions (Patient has pulse or is breathing): Full Support      Electronically signed by RENE Milligan, 09/20/24, 9:56 AM EDT.  This visit was performed by both a physician and an APC.  I personally evaluated and examined the patient.  I performed all aspects of MDM as documented.  I have reviewed and confirmed the accuracy of the  patient's history as documented in this note and I have reexamined the patient and the results are consistent with the previously documented exam.  I have updated the documentation as necessary. Electronically signed by Fred Leon MD, 09/20/24, 2:49 PM EDT.

## 2024-09-20 NOTE — NURSING NOTE
Patient woke up confused and got out of bed. Patient began walking in hallway swinging his cardiac monitor at staff members therefore security was called for assistance. Patient states he is scared and has been getting confused after becoming sick and the only thing that makes it better is being around his people. Patient escorted back to his room by security and now appears to be A&O at 0392. 1853: Patients daughter called and states that patient will become confused usually on the second night of a hospital stay and he has done this before. States patients stays home with wife and dog and sleeps on the couch so when he is in a new environment he get scared and wants to be home. Daughter requests sleep medicine to be given nightly if patient must stay additional nights because lack of sleep also makes her father this way.

## 2024-09-20 NOTE — PROGRESS NOTES
Walking Oximetry Progress Note      Patient Name:  Huy Reddy  YOB: 1940  Date of Procedure: 09/20/24              ROOM AIR BASELINE   SpO2% 96   Heart Rate 79     EXERCISE ON ROOM AIR SpO2% EXERCISE ON O2 LPM SpO2%   1 MINUTE 94 1 MINUTE     2 MINUTES 95 2 MINUTES     3 MINUTES 96 3 MINUTES     4 MINUTES 95 4 MINUTES     5 MINUTES  5 MINUTES     6 MINUTES  6 MINUTES                Time to Recovery     SpO2% Post Exercise  96 on room air   HR Post Exercise  92     Comments: Patient unable to walk the full 6 minutes.  He became very unsteady and stated that he felt like his legs were going to give out and that they were hurting.           Electronically signed by Sammie Franz RRT, 09/20/24, 9:26 AM EDT.

## 2024-09-20 NOTE — PROGRESS NOTES
"[facility] Clinical Pharmacy Services: Vancomycin Monitoring Note    Huy Reddy is a 84 y.o. male who is on day  of pharmacy to dose vancomycin for Pneumonia.    Previous Vancomycin Dose:   1500 mg IV x1 on  @0002  Imaging Reviewed?: Yes   CXR: decrease in left-sided pleural effusion s/p thoracentesis; layering trace bilateral pleural effusions and associated compressive atelectasis   CT Chest: increased size of bilateral pleural effusions compared to prior CT, now small to moderate on the right and moderate on the left, with associated partial lower lobe compressive atelectasis; bilateral interlobular septal thickening, suggestive of pulmonary edema   CXR: moderate left and trace right pleural effusions with bibasilar atelectasis    Updated Cultures and Sensitivities:    Body fluid cx, pleural cavity: preliminary with no growth   Strep pneumo/legionella urinary antigens: negative   MRSA PCR: negative   Blood cx : NGTD   COVID/flu/RSV: negative    Vitals/Labs  Ht: 182.9 cm (72\"); Wt: 70.7 kg (155 lb 13.8 oz)   Temp (24hrs), Av.2 °F (36.8 °C), Min:97.9 °F (36.6 °C), Max:98.6 °F (37 °C)   Estimated Creatinine Clearance: 11.2 mL/min (A) (by C-G formula based on SCr of 5.01 mg/dL (H)).     Results from last 7 days   Lab Units 24  0452 24  0528 24  1655   VANCOMYCIN RM mcg/mL 14.30  --   --    CREATININE mg/dL 5.01* 4.57* 4.71*   WBC 10*3/mm3 10.29 8.79 7.22     Assessment/Plan  Current Vancomycin Dose: pulse dosing with 500 mg IV once on  at 0930  given vancomycin level of 14.30 mcg/mL this morning. Will order a vanc random tomorrow to determine further dosing.   Next Vanc Random ordered for  at 0600 with AM labs  We will continue to monitor patient changes and renal function     Thank you for involving pharmacy in this patient's care. Please contact pharmacy with any questions or concerns.    Suzy Lopez [facility]  Clinical Pharmacist  "

## 2024-09-20 NOTE — CASE MANAGEMENT/SOCIAL WORK
The Patient requires the HOB to be elevated more than 30 degrees due to chronic CHF diagnosis. Pt requires frequent and immediate changes in body position. A hospital bed is required at home.

## 2024-09-21 ENCOUNTER — READMISSION MANAGEMENT (OUTPATIENT)
Dept: CALL CENTER | Facility: HOSPITAL | Age: 84
End: 2024-09-21
Payer: MEDICARE

## 2024-09-21 ENCOUNTER — APPOINTMENT (OUTPATIENT)
Dept: GENERAL RADIOLOGY | Facility: HOSPITAL | Age: 84
DRG: 280 | End: 2024-09-21
Payer: MEDICARE

## 2024-09-21 VITALS
RESPIRATION RATE: 16 BRPM | OXYGEN SATURATION: 100 % | SYSTOLIC BLOOD PRESSURE: 147 MMHG | TEMPERATURE: 98.1 F | WEIGHT: 155.87 LBS | BODY MASS INDEX: 21.11 KG/M2 | HEART RATE: 68 BPM | HEIGHT: 72 IN | DIASTOLIC BLOOD PRESSURE: 51 MMHG

## 2024-09-21 LAB
ALBUMIN SERPL-MCNC: 2.9 G/DL (ref 3.5–5.2)
ALP SERPL-CCNC: 69 U/L (ref 39–117)
ALT SERPL W P-5'-P-CCNC: 8 U/L (ref 1–41)
ANION GAP SERPL CALCULATED.3IONS-SCNC: 13.4 MMOL/L (ref 5–15)
AST SERPL-CCNC: 18 U/L (ref 1–40)
BASOPHILS # BLD AUTO: 0.05 10*3/MM3 (ref 0–0.2)
BASOPHILS NFR BLD AUTO: 0.6 % (ref 0–1.5)
BILIRUB CONJ SERPL-MCNC: 0.1 MG/DL (ref 0–0.3)
BILIRUB INDIRECT SERPL-MCNC: 0.1 MG/DL
BILIRUB SERPL-MCNC: 0.2 MG/DL (ref 0–1.2)
BUN SERPL-MCNC: 45 MG/DL (ref 8–23)
BUN/CREAT SERPL: 8.5 (ref 7–25)
CALCIUM SPEC-SCNC: 8.4 MG/DL (ref 8.6–10.5)
CHLORIDE SERPL-SCNC: 106 MMOL/L (ref 98–107)
CO2 SERPL-SCNC: 22.6 MMOL/L (ref 22–29)
CREAT SERPL-MCNC: 5.31 MG/DL (ref 0.76–1.27)
DEPRECATED RDW RBC AUTO: 47.1 FL (ref 37–54)
EGFRCR SERPLBLD CKD-EPI 2021: 10.1 ML/MIN/1.73
EOSINOPHIL # BLD AUTO: 0.34 10*3/MM3 (ref 0–0.4)
EOSINOPHIL NFR BLD AUTO: 4.1 % (ref 0.3–6.2)
ERYTHROCYTE [DISTWIDTH] IN BLOOD BY AUTOMATED COUNT: 13.6 % (ref 12.3–15.4)
GLUCOSE SERPL-MCNC: 90 MG/DL (ref 65–99)
HCT VFR BLD AUTO: 23.3 % (ref 37.5–51)
HGB BLD-MCNC: 7.7 G/DL (ref 13–17.7)
IMM GRANULOCYTES # BLD AUTO: 0.05 10*3/MM3 (ref 0–0.05)
IMM GRANULOCYTES NFR BLD AUTO: 0.6 % (ref 0–0.5)
LYMPHOCYTES # BLD AUTO: 0.91 10*3/MM3 (ref 0.7–3.1)
LYMPHOCYTES NFR BLD AUTO: 11 % (ref 19.6–45.3)
MAGNESIUM SERPL-MCNC: 2.2 MG/DL (ref 1.6–2.4)
MCH RBC QN AUTO: 31.4 PG (ref 26.6–33)
MCHC RBC AUTO-ENTMCNC: 33 G/DL (ref 31.5–35.7)
MCV RBC AUTO: 95.1 FL (ref 79–97)
MONOCYTES # BLD AUTO: 0.81 10*3/MM3 (ref 0.1–0.9)
MONOCYTES NFR BLD AUTO: 9.8 % (ref 5–12)
NEUTROPHILS NFR BLD AUTO: 6.1 10*3/MM3 (ref 1.7–7)
NEUTROPHILS NFR BLD AUTO: 73.9 % (ref 42.7–76)
NRBC BLD AUTO-RTO: 0 /100 WBC (ref 0–0.2)
PHOSPHATE SERPL-MCNC: 4.8 MG/DL (ref 2.5–4.5)
PLATELET # BLD AUTO: 221 10*3/MM3 (ref 140–450)
PMV BLD AUTO: 10 FL (ref 6–12)
POTASSIUM SERPL-SCNC: 3.6 MMOL/L (ref 3.5–5.2)
PROT SERPL-MCNC: 5.4 G/DL (ref 6–8.5)
RBC # BLD AUTO: 2.45 10*6/MM3 (ref 4.14–5.8)
SODIUM SERPL-SCNC: 142 MMOL/L (ref 136–145)
WBC NRBC COR # BLD AUTO: 8.26 10*3/MM3 (ref 3.4–10.8)

## 2024-09-21 PROCEDURE — 82088 ASSAY OF ALDOSTERONE: CPT | Performed by: INTERNAL MEDICINE

## 2024-09-21 PROCEDURE — 80048 BASIC METABOLIC PNL TOTAL CA: CPT | Performed by: FAMILY MEDICINE

## 2024-09-21 PROCEDURE — 71045 X-RAY EXAM CHEST 1 VIEW: CPT

## 2024-09-21 PROCEDURE — 25010000002 FUROSEMIDE PER 20 MG: Performed by: FAMILY MEDICINE

## 2024-09-21 PROCEDURE — 80076 HEPATIC FUNCTION PANEL: CPT | Performed by: FAMILY MEDICINE

## 2024-09-21 PROCEDURE — 94799 UNLISTED PULMONARY SVC/PX: CPT

## 2024-09-21 PROCEDURE — 84244 ASSAY OF RENIN: CPT | Performed by: INTERNAL MEDICINE

## 2024-09-21 PROCEDURE — 85025 COMPLETE CBC W/AUTO DIFF WBC: CPT | Performed by: FAMILY MEDICINE

## 2024-09-21 PROCEDURE — 83735 ASSAY OF MAGNESIUM: CPT | Performed by: FAMILY MEDICINE

## 2024-09-21 PROCEDURE — 99239 HOSP IP/OBS DSCHRG MGMT >30: CPT | Performed by: FAMILY MEDICINE

## 2024-09-21 PROCEDURE — 84100 ASSAY OF PHOSPHORUS: CPT | Performed by: FAMILY MEDICINE

## 2024-09-21 RX ORDER — FUROSEMIDE 40 MG
40 TABLET ORAL
Status: DISCONTINUED | OUTPATIENT
Start: 2024-09-21 | End: 2024-09-21 | Stop reason: HOSPADM

## 2024-09-21 RX ADMIN — TAMSULOSIN HYDROCHLORIDE 0.4 MG: 0.4 CAPSULE ORAL at 08:27

## 2024-09-21 RX ADMIN — AMLODIPINE BESYLATE 10 MG: 10 TABLET ORAL at 08:26

## 2024-09-21 RX ADMIN — Medication 10 ML: at 08:27

## 2024-09-21 RX ADMIN — FUROSEMIDE 40 MG: 40 TABLET ORAL at 08:26

## 2024-09-21 RX ADMIN — HYDRALAZINE HYDROCHLORIDE 25 MG: 25 TABLET ORAL at 08:27

## 2024-09-21 RX ADMIN — FUROSEMIDE 60 MG: 10 INJECTION, SOLUTION INTRAMUSCULAR; INTRAVENOUS at 05:30

## 2024-09-21 NOTE — PLAN OF CARE
Goal Outcome Evaluation:  Plan of Care Reviewed With: patient, spouse, daughter        Progress: improving  Outcome Evaluation: Patient to be discharge home with spouse/family, to f/u with primary MD for labs.

## 2024-09-21 NOTE — DISCHARGE SUMMARY
[facility]         HOSPITALIST  DISCHARGE SUMMARY    Patient Name: Huy Reddy  : 1940  MRN: [id number]    Date of Admission: 2024  Date of Discharge:  2024    Primary Care Physician: Allison Villafana MD    Consults       Date and Time Order Name Status Description    2024  6:57 AM Inpatient Pulmonology Consult Completed     2024  4:14 AM Inpatient Nephrology Consult      2024 10:37 PM Hospitalist (on-call MD unless specified)              Active and Resolved Hospital Problems:    Acute decompensation of chronic diastolic CHF  SERJIO with CKD stage IV  Elevated troponins likely type II MI NSTEMI due to respiratory demands  Recent left-sided pneumonia  Right-sided pneumothorax small apical  CKD stage IV  Dyslipidemia  Essential hypertension  Monoclonal gammopathy  PVD  History of TIA    Active Hospital Problems   No active problems to display.      Resolved Hospital Problems    Diagnosis POA   • **Pneumonia [J18.9] Yes       Hospital Course     Hospital Course:    84-year-old male with history of aortic aneurysm, CKD stage IV, dyslipidemia, chronic diastolic CHF, hypertension, monoclonal gammopathy, PVD, TIA, hospitalized on 2024, with chief complaint of lower abdominal pain and feet swelling found to have volume overload, with recent lung infections, found to have moderately sized pleural effusion, pulmonary consulted for thoracentesis, left-sided thoracentesis, chest CT shows significant sized pleural effusion.  Placed on diuretics and empiric antibiotics.  Right-sided thoracentesis attempted, no significant drainage, postprocedure had a small pneumothorax that was monitored and seem to have improved.  Volume status corrected, hemoglobin did drop to 7.7 with no signs of acute bleeding, advised patient to monitor hemoglobins as outpatient, transfusion not warranted at this time.  Patient requested disposition home on 2024, with stability and breathing on  room air, patient discharged in hemodynamic stable addition to follow-up with pulmonary as outpatient.    The patient has a medical condition which required positioning of the body in ways not feasible in an ordinary bed. Patient requires frequent and immediate changes in body position.          Day of Discharge     Vital Signs:  Temp:  [97.5 °F (36.4 °C)-98.1 °F (36.7 °C)] 97.5 °F (36.4 °C)  Heart Rate:  [72-76] 72  Resp:  [18] 18  BP: (107-168)/(53-92) 107/92  Flow (L/min):  [3] 3  Review of systems:  All systems reviewed and negative except for weakness, fatigue    Physical Exam                 Constitutional: Awake, alert, no acute distress   Eyes: Pupils equal, sclerae anicteric, no conjunctival injection   HENT: NCAT, mucous membranes moist   Neck: Supple, full range of motion   Respiratory: Diminished and coarse to auscultation bilaterally, nonlabored respirations    Cardiovascular: RRR, no murmurs, rubs, or gallops, palpable pedal pulses bilaterally   Gastrointestinal: Positive bowel sounds, soft, nontender, nondistended   Musculoskeletal: Positive bilateral ankle edema, no clubbing or cyanosis to extremities   Psychiatric: Appropriate affect, cooperative   Neurologic: Oriented x 3, strength symmetric in all extremities, Cranial Nerves grossly intact to confrontation, speech clear   Skin: No rashes visible on exposed skin        Discharge Details        Discharge Medications        New Medications        Instructions Start Date   doxycycline 100 MG capsule  Commonly known as: VIBRAMYCIN   100 mg, Oral, 2 Times Daily             Changes to Medications        Instructions Start Date   furosemide 40 MG tablet  Commonly known as: Lasix  What changed: how much to take   40 mg, Oral, Daily             Continue These Medications        Instructions Start Date   amLODIPine 10 MG tablet  Commonly known as: NORVASC   10 mg, Oral, Daily      famotidine 20 MG tablet  Commonly known as: PEPCID   20 mg, Oral, Every  Morning      hydrALAZINE 25 MG tablet  Commonly known as: APRESOLINE   25 mg, Oral, 3 Times Daily      Magnesium 250 MG tablet   250 mg, Oral, Every Morning      pravastatin 40 MG tablet  Commonly known as: PRAVACHOL   40 mg, Oral, Daily      tamsulosin 0.4 MG capsule 24 hr capsule  Commonly known as: FLOMAX   0.4 mg, Oral, Daily             Stop These Medications      levoFLOXacin 500 MG tablet  Commonly known as: LEVAQUIN              Allergies   Allergen Reactions   • Aspirin Other (See Comments)      thins blood       Discharge Disposition:  Home-Health Care [facility]    Diet:  Hospital:  Diet Order   Procedures   • Diet: Cardiac; Healthy Heart (2-3 Na+); Fluid Consistency: Thin (IDDSI 0)       Discharge Activity: as tolerates      CODE STATUS:  Code Status and Medical Interventions: CPR (Attempt to Resuscitate); Full Support   Ordered at: 09/18/24 3262     Code Status (Patient has no pulse and is not breathing):    CPR (Attempt to Resuscitate)     Medical Interventions (Patient has pulse or is breathing):    Full Support         Future Appointments   Date Time Provider Department [place]   10/4/2024  1:30 PM Aracelis Larios APRN [facility] PCC ETW [facility]   10/16/2024  [facility] OV[facility]   10/16/2024  9:00 AM Jerod Kerr MD [facility] VS ETPending [facility] to [facility]       Additional Instructions for the Follow-ups that You Need to Schedule       Discharge Follow-up with PCP   As directed       Currently Documented PCP:    Allison Villafana MD    PCP Phone Number:    [phone]     Follow Up Details: 3 to 7 days                Pertinent  and/or Most Recent Results     PROCEDURES:   XR Chest 1 View    Result Date: 9/21/2024  XR CHEST 1 VW-  Date of exam: 9/21/2024, 6:04 A.M.  Indications: Pneumothorax on  the right; p11-jbhizyg effusion, not elsewhere classified; j18.9-pneumonia, unspecified organism; v36-vscinrzmum condition; i50.9-heart failure, unspecified  Comparison: 9/28/2024, 11:18 a.m.  FINDINGS: A single AP (or PA)  upright portable view of the chest is provided for review. Bilateral infiltrates are seen, left greater than right, predominantly in the lung bases. The findings may represent infectious multifocal pneumonia. Atelectasis may also be present. Superimposed pulmonary edema cannot be excluded. There may be mild cardiac enlargement. No definite pneumothorax is seen on the current study. No pneumomediastinum. There are bilateral pleural effusions. There is slight pulmonary hypoinflation. External artifacts obscure detail. The thoracic aorta is atherosclerotic. Atherosclerotic changes are seen elsewhere. Degenerative changes involve the shoulders and spine.       Worsening progression is suggested radiographically with increased bilateral infiltrates, left greater than right. The findings may represent infectious multifocal pneumonia. Superimposed pulmonary edema cannot be excluded. No definite pneumothorax is appreciated.    Portions of this note were completed with a voice recognition program.       Electronically Signed By-Kurt Harrison MD On:9/21/2024 6:29 AM      XR Chest 1 View    Result Date: 9/20/2024  XR CHEST 1 VW Date of Exam: 9/20/2024 11:22 AM EDT Indication: thoracentesis attempt Comparison: 9/19/2024 Findings: New small right apical pneumothorax. Small bilateral pleural effusions with left base consolidation. Cardiomegaly. Mediastinal contours within normal limits. Regional skeleton is unremarkable.     Impression: 1. New small right apical pneumothorax. 2. Bilateral pleural effusions with left base consolidation, cannot exclude superimposed pneumonia. Electronically Signed: Duncan Goldman MD  9/20/2024 11:31 AM EDT  Workstation ID: [id number]    XR Chest 1 View    Result Date: 9/19/2024  XR CHEST 1 VW Date of Exam: 9/19/2024 11:19 AM EDT Indication: Thoracentesis Comparison: Chest radiograph dated 9/18/2024 Findings: The cardiomediastinal silhouette is within normal limits. There is aortic arch  atherosclerotic calcification. There is significant decrease in size of the left-sided pleural effusion compared to the prior examination. There are trace bilateral layering pleural effusions. There is no evidence of pneumothorax. There are degenerative changes of the thoracic spine.     Impression: 1. Decrease in size of the left-sided pleural effusion status post thoracentesis. 2. Layering trace bilateral pleural effusions and associated compressive atelectasis. Electronically Signed: Bebeto Stanford  9/19/2024 11:48 AM EDT  Workstation ID: [id number]    CT Chest Without Contrast Diagnostic    Result Date: 9/19/2024  CT CHEST WO CONTRAST DIAGNOSTIC Date of Exam: 9/18/2024 11:43 PM EDT Indication: Pleural effusion. Comparison: AP chest x-ray 9/18/2024, CT chest 7/20/2024. Technique: Axial CT images were obtained of the chest without contrast administration.  Reconstructed coronal and sagittal images were also obtained. Automated exposure control and iterative construction methods were used. Findings: Bilateral pleural effusions of increased compared to 7/20/2024 CT, now small to moderate on the right and moderate on the left. These are associated with partial bilateral lower lobe compressive atelectasis. There is interlobular septal thickening. Heart  size is enlarged. There are coronary artery calcifications. Mildly prominent mediastinal lymph nodes are stable. There is bilateral gynecomastia. Stable 1.1 cm left hepatic hypodense lesion is incompletely characterized without intravenous contrast but likely represents a cyst. No acute osseous abnormality is identified.     Impression: 1.Increased size of bilateral pleural effusions compared to 7/20/2024 CT, now small to moderate on the right and moderate on the left, with associated partial lower lobe compressive atelectasis. 2.Bilateral interlobular septal thickening, suggestive of pulmonary edema. Electronically Signed: Margarita Ramos  9/19/2024 12:07 AM EDT   Workstation ID: [id number]    XR Chest 1 View    Result Date: 9/18/2024  XR CHEST 1 VW Date of Exam: 9/18/2024 5:17 PM EDT Indication: SOA Triage Protocol Comparison: Chest x-ray dated 7/19/2024 Findings: There is a moderate left pleural effusion which appears similar or slightly larger since 7/19/2024. Trace right pleural effusion is also noted. Bibasilar atelectasis is seen. No pneumothorax is seen. Cardiac silhouette is partially obscured. No acute osseous lesion is seen.     Impression: 1.Moderate left and trace right pleural effusions with bibasilar atelectasis. Electronically Signed: Francisco Arellano MD  9/18/2024 5:32 PM EDT  Workstation ID: [id number]    XR Chest 2 View    Result Date: 9/13/2024  INDICATION:  Pneumonia, cough for three weeks.  Additional History:  Tobacco use, skin cancer. TECHNIQUE:  Frontal and lateral chest. COMPARISON:  Chest radiographs 8/23/2024. FINDINGS: Chest shows lungs adequately inflated, with no pneumothorax. Left chest shows moderate pleural effusion, right chest very small pleural effusion, blunting right costophrenic angle. There is left lower lung opacity, with left hemidiaphragmatic obscuration, probable moderate pleural effusion, with adjacent lung base  passive atelectasis. Remainder of the lungs do not demonstrate consolidation, there is no lung mass. No pulmonary venous congestion or cardiomegaly. No osseous acute process. Upper abdomen without bowel obstruction. IMPRESSION: 1. Chest with moderate left pleural effusion, with concurrent left lower lobe passive atelectasis; effusion has increased from 3 weeks ago, left lower lung subacute/chronic infectious process?. 2. Right chest shows small pleural effusion, blunting right costophrenic angle; lungs otherwise show no consolidation or lung mass. 3. No pulmonary venous vascular congestion; no cardiomegaly. Signed by Andrey Martinez MD    XR Chest 2 View    Result Date: 8/23/2024  INDICATION:  Double pneumonia starting two  and a half weeks ago. TECHNIQUE:  Frontal and lateral chest. COMPARISON:  None Available FINDINGS:   The cardiomediastinal silhouette is normal in size.  There is left basilar consolidation. There is patchy right basilar infiltrate or atelectasis There is a small left pleural effusion.  There is no pneumothorax.  No acute osseous process.   IMPRESSION: Small left pleural effusion and left basilar consolidation, probably atelectasis. Patchy right basilar infiltrate or atelectasis. Signed by Kurt Tolbert MD       LAB RESULTS:      Lab 09/21/24  0454 09/20/24  0452 09/19/24  0528 09/18/24  2300 09/18/24  1655   WBC 8.26 10.29 8.79  --  7.22   HEMOGLOBIN 7.7* 8.9* 8.2*  --  8.6*   HEMATOCRIT 23.3* 27.5* 24.7*  --  27.0*   PLATELETS 221 269 232  --  248   NEUTROS ABS 6.10 9.18* 7.31*  --  5.80   IMMATURE GRANS (ABS) 0.05 0.04 0.04  --  0.03   LYMPHS ABS 0.91 0.46* 0.62*  --  0.59*   MONOS ABS 0.81 0.54 0.64  --  0.59   EOS ABS 0.34 0.02 0.13  --  0.16   MCV 95.1 94.2 94.3  --  94.7   PROCALCITONIN  --   --  0.18  --   --    LACTATE  --   --   --  0.7  --    LDH  --   --  197  --   --          Lab 09/21/24 0454 09/20/24  0452 09/19/24  0528 09/18/24  1655   SODIUM 142 140 140 139   POTASSIUM 3.6 4.1 4.2 4.1   CHLORIDE 106 105 106 104   CO2 22.6 20.7* 20.4* 20.6*   ANION GAP 13.4 14.3 13.6 14.4   BUN 45* 44* 41* 42*   CREATININE 5.31* 5.01* 4.57* 4.71*   EGFR 10.1* 10.8* 12.1* 11.6*   GLUCOSE 90 104* 87 123*   CALCIUM 8.4* 9.0 8.5* 8.8   MAGNESIUM 2.2 2.3  --   --    PHOSPHORUS 4.8* 4.3  --   --          Lab 09/21/24  0454 09/20/24  0452 09/18/24  1655   TOTAL PROTEIN 5.4* 6.3 6.3   ALBUMIN 2.9* 3.4* 3.4*   GLOBULIN  --  2.9 2.9   ALT (SGPT) 8 8 9   AST (SGOT) 18 16 15   BILIRUBIN 0.2 0.2 0.2   INDIRECT BILIRUBIN 0.1  --   --    BILIRUBIN DIRECT 0.1 0.1  --    ALK PHOS 69 82 79         Lab 09/18/24  1931 09/18/24  1655   PROBNP  --  32,427.0*   HSTROP T 76* 72*                 Lab 09/18/24  2052   PH, ARTERIAL 7.443    PCO2, ARTERIAL 32.4*   PO2 ART 69.8*   O2 SATURATION ART 94.6*   FIO2 21   HCO3 ART 22.2   BASE EXCESS ART -1.6     Brief Urine Lab Results  (Last result in the past 365 days)        Color   Clarity   Blood   Leuk Est   Nitrite   Protein   CREAT   Urine HCG        09/18/24 2151             97.2         09/18/24 2151 Yellow   Clear   Negative   Negative   Negative   >=300 mg/dL (3+)                 Microbiology Results (last 10 days)       Procedure Component Value - Date/Time    Body Fluid Culture - Body Fluid, Pleural Cavity [[id number]] Collected: 09/19/24 1004    Lab Status: Preliminary result Specimen: Body Fluid from Pleural Cavity Updated: 09/20/24 0729     Body Fluid Culture No growth     Gram Stain No organisms seen    S. Pneumo Ag Urine or CSF - Urine, Urine, Clean Catch [[id number]]  (Normal) Collected: 09/19/24 0454    Lab Status: Final result Specimen: Urine, Clean Catch Updated: 09/19/24 0622     Strep Pneumo Ag Negative    Legionella Antigen, Urine - Urine, Urine, Clean Catch [[id number]]  (Normal) Collected: 09/19/24 0454    Lab Status: Final result Specimen: Urine, Clean Catch Updated: 09/19/24 0621     LEGIONELLA ANTIGEN, URINE Negative    MRSA Screen, PCR (Inpatient) - Swab, Nares [[id number]]  (Normal) Collected: 09/19/24 0447    Lab Status: Final result Specimen: Swab from Nares Updated: 09/19/24 0643     MRSA PCR No MRSA Detected    Narrative:      The negative predictive value of this diagnostic test is high and should only be used to consider de-escalating anti-MRSA therapy. A positive result may indicate colonization with MRSA and must be correlated clinically.    Blood Culture - Blood, Arm, Right [[id number]]  (Normal) Collected: 09/18/24 2300    Lab Status: Preliminary result Specimen: Blood from Arm, Right Updated: 09/20/24 2315     Blood Culture No growth at 2 days    Blood Culture - Blood, Hand, Left [[id number]]  (Normal) Collected: 09/18/24 2300    Lab Status: Preliminary result Specimen:  Blood from Hand, Left Updated: 09/20/24 2315     Blood Culture No growth at 2 days    COVID-19, FLU A/B, RSV PCR 1 HR TAT - Swab, Nasopharynx [[id number]]  (Normal) Collected: 09/18/24 1923    Lab Status: Final result Specimen: Swab from Nasopharynx Updated: 09/18/24 2009     COVID19 Not Detected     Influenza A PCR Not Detected     Influenza B PCR Not Detected     RSV, PCR Not Detected    Narrative:      Fact sheet for providers: https://www.fda.gov/media/847646/download    Fact sheet for patients: https://www.fda.gov/media/517886/download    Test performed by PCR.            XR Chest 1 View    Result Date: 9/21/2024  Impression: Worsening progression is suggested radiographically with increased bilateral infiltrates, left greater than right. The findings may represent infectious multifocal pneumonia. Superimposed pulmonary edema cannot be excluded. No definite pneumothorax is appreciated.    Portions of this note were completed with a voice recognition program.       Electronically Signed By-Kurt Harrison MD On:9/21/2024 6:29 AM      XR Chest 1 View    Result Date: 9/20/2024  Impression: Impression: 1. New small right apical pneumothorax. 2. Bilateral pleural effusions with left base consolidation, cannot exclude superimposed pneumonia. Electronically Signed: Duncan Goldman MD  9/20/2024 11:31 AM EDT  Workstation ID: [id number]    XR Chest 1 View    Result Date: 9/19/2024  Impression: Impression: 1. Decrease in size of the left-sided pleural effusion status post thoracentesis. 2. Layering trace bilateral pleural effusions and associated compressive atelectasis. Electronically Signed: Bebeto Stanford  9/19/2024 11:48 AM EDT  Workstation ID: [id number]    CT Chest Without Contrast Diagnostic    Result Date: 9/19/2024  Impression: Impression: 1.Increased size of bilateral pleural effusions compared to 7/20/2024 CT, now small to moderate on the right and moderate on the left, with associated partial lower lobe compressive  atelectasis. 2.Bilateral interlobular septal thickening, suggestive of pulmonary edema. Electronically Signed: Margarita Richard  9/19/2024 12:07 AM EDT  Workstation ID: [id number]    XR Chest 1 View    Result Date: 9/18/2024  Impression: Impression: 1.Moderate left and trace right pleural effusions with bibasilar atelectasis. Electronically Signed: Francisco Arellano MD  9/18/2024 5:32 PM EDT  Workstation ID: [id number]             Results for orders placed during the hospital encounter of 07/20/24    Adult Transthoracic Echo Complete W/ Cont if Necessary Per Protocol    Interpretation Summary  •  Left ventricular systolic function is low normal. Left ventricular ejection fraction appears to be 51 - 55%.  •  Left ventricular wall thickness is consistent with borderline concentric hypertrophy.  •  Left ventricular diastolic function is consistent with (grade I) impaired relaxation and age.  •  Anterior fat pad and what looks like to be a trivial pericardial effusion noted.  Pleural effusion is also noted.  •  Mild to moderate aortic insufficiency.  No significant aortic stenosis with max/mean pressure gradient 15/9 mmHg.  •  Appears mild to moderate mitral regurgitation.      Labs Pending at Discharge:  Pending Labs       Order Current Status    Anaerobic Culture - Pleural Fluid, Pleural Cavity In process    Mycoplasma Pneumoniae PCR - Swab, Nasopharynx In process    Blood Culture - Blood, Arm, Right Preliminary result    Blood Culture - Blood, Hand, Left Preliminary result    Body Fluid Culture - Body Fluid, Pleural Cavity Preliminary result              Time spent on Discharge including face to face service:  35 minutes    Electronically signed by Melody Butler MD, 09/21/24, 8:15 AM EDT.    Portions of this documentation were transcribed electronically from a voice recognition software.  I confirm all data accurately represents the service(s) I performed at today's visit.

## 2024-09-21 NOTE — PROGRESS NOTES
[facility]     Nephrology Progress Note      Patient Name: Huy Reddy  : 1940  MRN: [id number]  Primary Care Physician:  Allison Villafana MD  Date of admission: 2024    Subjective   Subjective     Interval History:  Patient Reports feeling well.    Breathing well but still on oxygen with NC.    Review of Systems   All systems were reviewed and negative except for: What is mentioned above.    Objective   Objective     Vitals:   Temp:  [97.5 °F (36.4 °C)-98.1 °F (36.7 °C)] 97.5 °F (36.4 °C)  Heart Rate:  [72-76] 72  Resp:  [18] 18  BP: (107-168)/(53-92) 107/92  Flow (L/min):  [3] 3  Physical Exam:   Constitutional: Awake, alert, no distress, pale   Eyes: sclerae anicteric, no conjunctival injection   HENT: mucous membranes moist   Neck: Supple, no thyromegaly, no lymphadenopathy, trachea midline, No JVD   Respiratory: Decreased to auscultation bilaterally, nonlabored respirations    Cardiovascular: RRR, no murmurs, rubs, or gallops.   Gastrointestinal: Positive bowel sounds, soft, nontender, nondistended   Musculoskeletal: trace edema, no clubbing or cyanosis   Psychiatric: Appropriate affect, cooperative   Neurologic: Oriented x 3, moving all extremities, Cranial Nerves grossly intact, speech clear   Skin: warm and dry, no rashes     Result Review    Result Reviewed:  I have personally reviewed the results from the time of this admission to 2024 07:41 EDT and agree with these findings:  [x]  Laboratory  []  Microbiology  [x]  Radiology  []  EKG/Telemetry   []  Cardiology/Vascular   []  Pathology  [x]  Old records  []  Other:        Lab 24  0454 24  0452 24  0528 24  1655   SODIUM 142 140 140 139   POTASSIUM 3.6 4.1 4.2 4.1   CHLORIDE 106 105 106 104   CO2 22.6 20.7* 20.4* 20.6*   BUN 45* 44* 41* 42*   CREATININE 5.31* 5.01* 4.57* 4.71*   GLUCOSE 90 104* 87 123*   EGFR 10.1* 10.8* 12.1* 11.6*   ANION GAP 13.4 14.3 13.6 14.4   MAGNESIUM 2.2 2.3  --   --    PHOSPHORUS  4.8* 4.3  --   --              Assessment & Plan   Assessment / Plan       Active Hospital Problems:  There are no active hospital problems to display for this patient.  Acute kidney injury  Chronic kidney disease stage V  Anemia    Assessment and Plan:    CKD5-  He has had creatinine of 4.0-4.4 more recently.  CKD appears from HTN nephrosclerosis.  Atrophic kidneys on previous CT.  Referred to [facility] last office appt. reluctant to pursue dialysis preparation but wants to pursue dialysis when needed.  Long discussion with him regarding attended TOPS program and getting an AV fistula as soon as possible.  Currently on IV Lasix, volume status improved respiratory status improved after left thoracentesis.  Will change to po lasix today.  OK for d/c from renal standpoint.  Creatinine is higher but only a 1-2 ml/min difference in gfr at these levels of creatinine.    HTN-  check aldosterone/renin ratio continue home meds.  Would avoid acei/arb with advanced ckd.  Low-sodium diet    Anemia- hgb at 8.2.  recent tsat at 26%.  Status post Epogen 20,000 units subcu x 1 on 9/19/2024.    Proteinuria-  had noted 3+ on UA,  Had no monoclonal protein on urine GRETTA and serum GRETTA from 7/24.      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  Would continue this dose after discharge.    H/O CVA    PVD    Volume Overload-  BNP elevated with noted effusions.      PNA-  abx per primary.

## 2024-09-21 NOTE — PLAN OF CARE
Goal Outcome Evaluation:              Outcome Evaluation: Pt. alert and oriented x4 throughout shift with stable VS. No complaints of pain noted. Continue care per POC.

## 2024-09-22 LAB
BACTERIA FLD CULT: NORMAL
BACTERIA SPEC ANAEROBE CULT: NORMAL
GRAM STN SPEC: NORMAL

## 2024-09-23 LAB
BACTERIA SPEC AEROBE CULT: NORMAL
BACTERIA SPEC AEROBE CULT: NORMAL
M PNEUMO DNA SPEC QL NAA+PROBE: NEGATIVE

## 2024-09-24 LAB — BACTERIA SPEC ANAEROBE CULT: NORMAL

## 2024-09-25 ENCOUNTER — READMISSION MANAGEMENT (OUTPATIENT)
Dept: CALL CENTER | Facility: HOSPITAL | Age: 84
End: 2024-09-25
Payer: MEDICARE

## 2024-09-28 LAB
ALDOST SERPL-MCNC: 1.9 NG/DL (ref 0–30)
ALDOST/RENIN PLAS-RTO: 0.5 {RATIO} (ref 0–30)
RENIN PLAS-CCNC: 3.76 NG/ML/HR (ref 0.17–5.38)

## 2024-09-30 NOTE — PROGRESS NOTES
Primary Care Provider  Allison Villafana MD   Referring Provider  No ref. provider found    Patient Complaint  Hospital Follow Up Visit (-), Shortness of Breath, and Cough (Once in the morning yellow mucous )      Subjective       History of Presenting Illness  Huy Reddy is a pleasant 84 y.o. male who presents to [facility] for hospital followup. Patient here with spouse. Patient was at [facility] -2024. Hospital course includes the followin-year-old male with history of aortic aneurysm, CKD stage IV, dyslipidemia, chronic diastolic CHF, hypertension, monoclonal gammopathy, PVD, TIA, hospitalized on 2024, with chief complaint of lower abdominal pain and feet swelling found to have volume overload, with recent lung infections, found to have moderately sized pleural effusion, pulmonary consulted for thoracentesis, left-sided thoracentesis by Dr. Leon, 1400 ml drained, chest CT shows significant sized pleural effusion.  Placed on diuretics and empiric antibiotics.  Right-sided thoracentesis attempted by Dr. Leon, no significant drainage, postprocedure had a small pneumothorax that was monitored and seem to have improved.  Volume status corrected, hemoglobin did drop to 7.7 with no signs of acute bleeding, advised patient to monitor hemoglobins as outpatient, transfusion not warranted at this time.  Patient requested disposition home on 2024, with stability and breathing on room air, patient discharged in hemodynamic stable addition to follow-up with pulmonary as outpatient.    Cytology from thoracentesis was negative for malignant cells, results from thoracentesis pleural fluid cultures were negative.      The patient has a medical condition which required positioning of the body in ways not feasible in an ordinary bed. Patient requires frequent and immediate changes in body position.      Patient presents today for follow-up stating  his breathing is much better than when he was in the hospital.  He is currently not on any medications for his lungs.  He takes no oxygen or any durable medical equipment.  He is a former smoker for about 60 years a pack a day quitting in 2018.  Patient has chronic kidney disease stage IV.  Patient states they want him to start dialysis however he does not want to have dialysis.  He really does not want any further diagnostics done at this time.  He does not see a cardiologist and does not want an appointment with a cardiologist.  He had an appointment in August and he canceled it.  Patient spouse states he does not like doctors and going to appointments.  Patient does not want to be on any inhalers because he does not want to be hooked on them and he says they are too costly.    He still does have some shortness of air with exertional activities but he improves with rest.  He does have occasional cough with productive yellow sputum.  Patient denies wheezing, headaches, chest pain, weight loss or hemoptysis. Patient denies fevers, chills and night sweats. Huy Reddy is able to perform ADLs without difficulties.    I have personally reviewed the review of systems, past family, social, medical and surgical histories; and agree with their findings.      Review of Systems   Constitutional: Negative.    HENT: Negative.     Respiratory:  Positive for cough and shortness of breath.    Cardiovascular: Negative.    Musculoskeletal: Negative.    Neurological: Negative.    Psychiatric/Behavioral: Negative.           History reviewed. No pertinent family history.     Social History     Socioeconomic History    Marital status:    Tobacco Use    Smoking status: Former     Current packs/day: 0.00     Types: Cigarettes     Start date: 2016     Quit date: 2019     Years since quittin.2    Smokeless tobacco: Never   Vaping Use    Vaping status: Never Used   Substance and Sexual Activity    Alcohol use: Not  "Currently    Drug use: Never    Sexual activity: Defer        Past Medical History:   Diagnosis Date    Aortic aneurysm     Chronic renal failure     Hyperlipidemia     Hypertension     Monoclonal gammopathy     Peripheral vascular disease     Renal disorder     Skin cancer     TIA (transient ischemic attack)     Vitamin D deficiency         Immunization History   Administered Date(s) Administered    COVID-19 (MODERNA) 1st,2nd,3rd Dose Monovalent 02/08/2021, 03/11/2021    COVID-19 (MODERNA) Monovalent Original Booster 12/30/2021    Fluad Quad 65+ 10/01/2020, 11/01/2021    Fluzone High-Dose 65+YRS 12/30/2016, 09/18/2017, 10/16/2019    Fluzone High-Dose 65+yrs 11/22/2022    Pneumococcal Conjugate 13-Valent (PCV13) 06/22/2016    Pneumococcal Polysaccharide (PPSV23) 01/01/2003, 03/18/2019       Allergies   Allergen Reactions    Aspirin Other (See Comments)      thins blood          Current Outpatient Medications:     amLODIPine (NORVASC) 10 MG tablet, Take 1 tablet by mouth Daily., Disp: , Rfl:     famotidine (PEPCID) 20 MG tablet, Take 1 tablet by mouth Every Morning., Disp: , Rfl:     furosemide (Lasix) 40 MG tablet, Take 1 tablet by mouth Daily for 30 doses., Disp: 30 tablet, Rfl: 0    hydrALAZINE (APRESOLINE) 25 MG tablet, Take 1 tablet by mouth 3 (Three) Times a Day., Disp: , Rfl:     Magnesium 250 MG tablet, Take 1 tablet by mouth Every Morning., Disp: , Rfl:     pravastatin (PRAVACHOL) 40 MG tablet, Take 1 tablet by mouth Daily., Disp: , Rfl:     tamsulosin (FLOMAX) 0.4 MG capsule 24 hr capsule, Take 1 capsule by mouth Daily., Disp: 10 capsule, Rfl: 0         Vital Signs   /57 (BP Location: Left arm, Patient Position: Sitting, Cuff Size: Adult)   Pulse 64   Temp 97.7 °F (36.5 °C)   Resp 16   Ht 182.9 cm (72\")   Wt 63.5 kg (140 lb)   SpO2 95% Comment: room air  BMI 18.99 kg/m²       Objective     Physical Exam  Vitals reviewed.   Constitutional:       General: He is not in acute distress.     " Appearance: Normal appearance. He is ill-appearing.   HENT:      Head: Normocephalic and atraumatic.      Nose: Nose normal.      Mouth/Throat:      Mouth: Mucous membranes are moist.      Pharynx: Oropharynx is clear.   Eyes:      Extraocular Movements: Extraocular movements intact.      Conjunctiva/sclera: Conjunctivae normal.      Pupils: Pupils are equal, round, and reactive to light.   Cardiovascular:      Rate and Rhythm: Normal rate and regular rhythm.      Pulses: Normal pulses.      Heart sounds: Normal heart sounds.   Pulmonary:      Effort: Pulmonary effort is normal. No respiratory distress.      Breath sounds: No stridor. No wheezing, rhonchi or rales.   Abdominal:      General: Bowel sounds are normal.   Musculoskeletal:         General: Normal range of motion.      Cervical back: Normal range of motion and neck supple.   Skin:     General: Skin is warm and dry.   Neurological:      Mental Status: He is alert and oriented to person, place, and time.   Psychiatric:         Behavior: Behavior normal.         Results Review  I have personally reviewed the prior office notes, hospital records, labs, and diagnostics.  CT Chest Without Contrast Diagnostic [AQI500] (Order [id number])  Order  Status: Final result     Study Notes     Lashanda Balderrama on 9/18/2024 11:45 PM EDT   Pt states he had pneumonia in July and has felt shortness of breath ever since.       Appointment Information    PACS Images     Radiology Images  Study Result    Narrative & Impression   CT CHEST WO CONTRAST DIAGNOSTIC     Date of Exam: 9/18/2024 11:43 PM EDT     Indication: Pleural effusion.     Comparison: AP chest x-ray 9/18/2024, CT chest 7/20/2024.     Technique: Axial CT images were obtained of the chest without contrast administration.  Reconstructed coronal and sagittal images were also obtained. Automated exposure control and iterative construction methods were used.     Findings:  Bilateral pleural effusions of increased  compared to 7/20/2024 CT, now small to moderate on the right and moderate on the left. These are associated with partial bilateral lower lobe compressive atelectasis. There is interlobular septal thickening. Heart   size is enlarged. There are coronary artery calcifications. Mildly prominent mediastinal lymph nodes are stable. There is bilateral gynecomastia. Stable 1.1 cm left hepatic hypodense lesion is incompletely characterized without intravenous contrast but   likely represents a cyst. No acute osseous abnormality is identified.     IMPRESSION:  Impression:  1.Increased size of bilateral pleural effusions compared to 7/20/2024 CT, now small to moderate on the right and moderate on the left, with associated partial lower lobe compressive atelectasis.  2.Bilateral interlobular septal thickening, suggestive of pulmonary edema.           Electronically Signed: Margarita Ramos    9/19/2024 12:07 AM EDT    Workstation ID: [id number]   XR Chest 1 View [[id number]] (Order [id number])  Order  Status: Final result     Study Notes     Kenny Estevez on 9/21/2024  6:04 AM EDT   PNEUMOTHORAX     Appointment Information    PACS Images     Radiology Images  Study Result    Narrative & Impression   XR CHEST 1 VW-     Date of exam: 9/21/2024, 6:04 A.M.     Indications: Pneumothorax on  the right; q83-qjxpggc effusion, not  elsewhere classified; j18.9-pneumonia, unspecified organism;  x33-urtksbiupa condition; i50.9-heart failure, unspecified     Comparison: 9/28/2024, 11:18 a.m.     FINDINGS:  A single AP (or PA) upright portable view of the chest is provided for  review. Bilateral infiltrates are seen, left greater than right,  predominantly in the lung bases. The findings may represent infectious  multifocal pneumonia. Atelectasis may also be present. Superimposed  pulmonary edema cannot be excluded. There may be mild cardiac  enlargement. No definite pneumothorax is seen on the current study. No  pneumomediastinum. There are  bilateral pleural effusions. There is  slight pulmonary hypoinflation. External artifacts obscure detail. The  thoracic aorta is atherosclerotic. Atherosclerotic changes are seen  elsewhere. Degenerative changes involve the shoulders and spine.        IMPRESSION:  Worsening progression is suggested radiographically with increased  bilateral infiltrates, left greater than right. The findings may  represent infectious multifocal pneumonia. Superimposed pulmonary edema  cannot be excluded. No definite pneumothorax is appreciated.           Portions of this note were completed with a voice recognition program.                    Electronically Signed By-Kurt Harrison MD On:9/21/2024 6:29 AM     Non-gynecologic Cytology: [id number]  Order: [id number]  Status: Final result       Visible to patient: Yes (not seen)       Next appt: 10/04/2024 at 01:30 PM in Pulmonology (RENE Patel)    Specimen Information: Pleural Cavity; Pleural Fluid   0 Result Notes      Component    Case Report   Medical Cytology Report                           Case: [id number]                                   Authorizing Provider:  Deidre Canada,    Collected:           09/19/2024 10:04 AM                                  APRN                                                                         Ordering Location:     [address]  Received:            09/19/2024 10:23 AM                                  [facility]                                                                              UNIT                                                                         Pathologist:           Ailyn Camilo MD                                                     Specimen:    Pleural Cavity                                                                            Final Diagnosis   Pleural fluid, left, thoracentesis:                - Negative for malignant cells      Electronically signed by Ton  "Ailyn FLORIAN MD on 9/20/2024 at 1359   Clinical Information    Moderate left sided pleural effusion. Hospital-acquired pneumonia.   Gross Description    1. Pleural Cavity.  Pleural Fluid, Left  1500 cc thin, clear, guerrero-yellow fluid received (1 cytospin prep prepared).      Microscopic Description    Microscopic examination performed.   Resulting Agency [facility]              Specimen Collected: 09/19/24 10:04 EDT Last Resulted: 09/20/24 13:59 EDT        Complete Transthoracic Echocardiogram with Complete Doppler and Color Flow    Accession Number: [id number]   Date of Study: 7/20/24   Ordering Provider: Timi Veliz MD   Clinical Indications: Heart Failure, Cardiomyopathy or Systemic or Pulmonary Hypertension        [facility]  Performing Staff   Cardiology: Duncan Calzada MD    Tech: Lawanda Cordova        Patient Hx Of Height, Weight, and Vitals    Height Weight BSA (Calculated - sq m) BMI (Calculated) Retired BMI (kg/m2) Pulse BP   182.9 cm (72\") 66.9 kg (147 lb 7.8 oz) 1.87 sq meters 20  83 152/51      [facility] PACS Images     Show images for Adult Transthoracic Echo Complete W/ Cont if Necessary Per Protocol   Clinical Indication    Heart Failure, Cardiomyopathy or Systemic or Pulmonary Hypertension     Interpretation Summary         Left ventricular systolic function is low normal. Left ventricular ejection fraction appears to be 51 - 55%.    Left ventricular wall thickness is consistent with borderline concentric hypertrophy.    Left ventricular diastolic function is consistent with (grade I) impaired relaxation and age.    Anterior fat pad and what looks like to be a trivial pericardial effusion noted.  Pleural effusion is also noted.    Mild to moderate aortic insufficiency.  No significant aortic stenosis with max/mean pressure gradient 15/9 mmHg.    Appears mild to moderate mitral regurgitation.  Assessment         Patient Active Problem List   Diagnosis    Acute kidney injury " superimposed on chronic kidney disease    Hypertension    Hyperlipidemia    Hypomagnesemia    Hypokalemia    Hypocalcemia    Metabolic acidosis    Normocytic anemia    Severe malnutrition    Benign prostatic hyperplasia    Chronic kidney disease, stage 4 (severe)    Gastroesophageal reflux disease        Plan     Diagnoses and all orders for this visit:    1. Pleural effusion, bilateral (Primary)  Comments:  Chest x-ray ordered to monitor status of pleural effusions  Orders:  -     XR Chest 2 View; Future    2. Dyspnea on exertion  Comments:  Discussed with patient inhalers to help with shortness of air, diagnostics including PFT walk test patient does not want any further diagnostics at this time    3. Chronic kidney disease, stage 4 (severe)  Comments:  Follow-up with nephrology as scheduled    4. Severe malnutrition    5. Nicotine dependence, cigarettes, in remission    6. Congestive heart failure, unspecified HF chronicity, unspecified heart failure type  Comments:  Offered to make referral to cardiology for patient patient declined referral             Smoking status:  reports that he quit smoking about 5 years ago. His smoking use included cigarettes. He started smoking about 8 years ago. He has never used smokeless tobacco.    Vaccination status: Reviewed  Immunization History   Administered Date(s) Administered    COVID-19 (MODERNA) 1st,2nd,3rd Dose Monovalent 02/08/2021, 03/11/2021    COVID-19 (MODERNA) Monovalent Original Booster 12/30/2021    Fluad Quad 65+ 10/01/2020, 11/01/2021    Fluzone High-Dose 65+YRS 12/30/2016, 09/18/2017, 10/16/2019    Fluzone High-Dose 65+yrs 11/22/2022    Pneumococcal Conjugate 13-Valent (PCV13) 06/22/2016    Pneumococcal Polysaccharide (PPSV23) 01/01/2003, 03/18/2019        Medications personally reviewed    Follow Up  Return in about 6 weeks (around 11/15/2024) for with Dr. Leon.    Patient was given instructions and counseling regarding his condition or for health  maintenance advice. Please see specific information pulled into the AVS if appropriate.     I spent 20 minutes caring for Huy Reddy on this date of service. This time includes time spent by me in the following activities:preparing for the visit, reviewing tests, obtaining and/or reviewing a separately obtained history, performing a medically appropriate examination and/or evaluation, counseling and educating the patient/family/caregiver, ordering medications, tests, or procedures, documenting information in the medical record, independently interpreting results and communicating that information with the patient/family/caregiver and answered questions family members, discuss medications.

## 2024-10-04 ENCOUNTER — READMISSION MANAGEMENT (OUTPATIENT)
Dept: CALL CENTER | Facility: HOSPITAL | Age: 84
End: 2024-10-04
Payer: MEDICARE

## 2024-10-04 ENCOUNTER — OFFICE VISIT (OUTPATIENT)
Dept: PULMONOLOGY | Facility: CLINIC | Age: 84
End: 2024-10-04
Payer: MEDICARE

## 2024-10-04 VITALS
HEIGHT: 72 IN | HEART RATE: 64 BPM | OXYGEN SATURATION: 95 % | TEMPERATURE: 97.7 F | WEIGHT: 140 LBS | DIASTOLIC BLOOD PRESSURE: 57 MMHG | BODY MASS INDEX: 18.96 KG/M2 | RESPIRATION RATE: 16 BRPM | SYSTOLIC BLOOD PRESSURE: 150 MMHG

## 2024-10-04 DIAGNOSIS — E43 SEVERE MALNUTRITION: ICD-10-CM

## 2024-10-04 DIAGNOSIS — F17.211 NICOTINE DEPENDENCE, CIGARETTES, IN REMISSION: ICD-10-CM

## 2024-10-04 DIAGNOSIS — J90 PLEURAL EFFUSION, BILATERAL: Primary | ICD-10-CM

## 2024-10-04 DIAGNOSIS — N18.4 CHRONIC KIDNEY DISEASE, STAGE 4 (SEVERE): Chronic | ICD-10-CM

## 2024-10-04 DIAGNOSIS — I50.9 CONGESTIVE HEART FAILURE, UNSPECIFIED HF CHRONICITY, UNSPECIFIED HEART FAILURE TYPE: ICD-10-CM

## 2024-10-04 DIAGNOSIS — R06.09 DYSPNEA ON EXERTION: ICD-10-CM

## 2024-10-04 NOTE — OUTREACH NOTE
COPD/PN Week 2 Survey      Flowsheet Row Responses   [facility] patient discharged from? [facility]   Does the patient have one of the following disease processes/diagnoses(primary or secondary)? Pneumonia   Week 2 attempt successful? No   Unsuccessful attempts Attempt 1            Jen Gtz Registered Nurse

## 2024-10-22 ENCOUNTER — HOSPITAL ENCOUNTER (OUTPATIENT)
Dept: GENERAL RADIOLOGY | Facility: HOSPITAL | Age: 84
Discharge: HOME | End: 2024-10-22
Admitting: NURSE PRACTITIONER
Payer: MEDICARE

## 2024-10-22 DIAGNOSIS — J90 PLEURAL EFFUSION, BILATERAL: ICD-10-CM

## 2024-10-22 PROCEDURE — 71046 X-RAY EXAM CHEST 2 VIEWS: CPT

## 2024-10-29 ENCOUNTER — TRANSCRIBE ORDERS (OUTPATIENT)
Dept: ADMINISTRATIVE | Facility: HOSPITAL | Age: 84
End: 2024-10-29
Payer: MEDICARE

## 2024-10-29 DIAGNOSIS — N25.81 SECONDARY HYPERPARATHYROIDISM OF RENAL ORIGIN: ICD-10-CM

## 2024-10-29 DIAGNOSIS — D47.2 MONOCLONAL PARAPROTEINEMIA: ICD-10-CM

## 2024-10-29 DIAGNOSIS — K21.9 CHALASIA OF LOWER ESOPHAGEAL SPHINCTER: ICD-10-CM

## 2024-10-29 DIAGNOSIS — E83.42 HYPOMAGNESEMIA: ICD-10-CM

## 2024-10-29 DIAGNOSIS — E55.9 AVITAMINOSIS D: ICD-10-CM

## 2024-10-29 DIAGNOSIS — N18.4 CHRONIC KIDNEY DISEASE, STAGE IV (SEVERE): Primary | ICD-10-CM

## 2024-10-29 DIAGNOSIS — I12.9 HYPERTENSIVE NEPHROPATHY: ICD-10-CM

## 2024-10-30 ENCOUNTER — LAB (OUTPATIENT)
Dept: LAB | Facility: HOSPITAL | Age: 84
End: 2024-10-30
Payer: MEDICARE

## 2024-10-30 DIAGNOSIS — E83.42 HYPOMAGNESEMIA: ICD-10-CM

## 2024-10-30 DIAGNOSIS — I12.9 HYPERTENSIVE NEPHROPATHY: ICD-10-CM

## 2024-10-30 DIAGNOSIS — K21.9 CHALASIA OF LOWER ESOPHAGEAL SPHINCTER: ICD-10-CM

## 2024-10-30 DIAGNOSIS — N18.4 CHRONIC KIDNEY DISEASE, STAGE IV (SEVERE): ICD-10-CM

## 2024-10-30 DIAGNOSIS — E55.9 AVITAMINOSIS D: ICD-10-CM

## 2024-10-30 DIAGNOSIS — D47.2 MONOCLONAL PARAPROTEINEMIA: ICD-10-CM

## 2024-10-30 DIAGNOSIS — N25.81 SECONDARY HYPERPARATHYROIDISM OF RENAL ORIGIN: ICD-10-CM

## 2024-10-30 LAB
ALBUMIN SERPL-MCNC: 3.3 G/DL (ref 3.5–5.2)
ANION GAP SERPL CALCULATED.3IONS-SCNC: 12 MMOL/L (ref 5–15)
BACTERIA UR QL AUTO: NORMAL /HPF
BASOPHILS # BLD AUTO: 0.05 10*3/MM3 (ref 0–0.2)
BASOPHILS NFR BLD AUTO: 0.6 % (ref 0–1.5)
BILIRUB UR QL STRIP: NEGATIVE
BUN SERPL-MCNC: 48 MG/DL (ref 8–23)
BUN/CREAT SERPL: 9.5 (ref 7–25)
CALCIUM SPEC-SCNC: 8.8 MG/DL (ref 8.6–10.5)
CHLORIDE SERPL-SCNC: 104 MMOL/L (ref 98–107)
CLARITY UR: CLEAR
CO2 SERPL-SCNC: 23 MMOL/L (ref 22–29)
COLOR UR: YELLOW
CREAT SERPL-MCNC: 5.04 MG/DL (ref 0.76–1.27)
CREAT UR-MCNC: 72 MG/DL
DEPRECATED RDW RBC AUTO: 45.5 FL (ref 37–54)
EGFRCR SERPLBLD CKD-EPI 2021: 10.7 ML/MIN/1.73
EOSINOPHIL # BLD AUTO: 0.28 10*3/MM3 (ref 0–0.4)
EOSINOPHIL NFR BLD AUTO: 3.2 % (ref 0.3–6.2)
ERYTHROCYTE [DISTWIDTH] IN BLOOD BY AUTOMATED COUNT: 13 % (ref 12.3–15.4)
GLUCOSE SERPL-MCNC: 87 MG/DL (ref 65–99)
GLUCOSE UR STRIP-MCNC: NEGATIVE MG/DL
HCT VFR BLD AUTO: 29 % (ref 37.5–51)
HGB BLD-MCNC: 9.8 G/DL (ref 13–17.7)
HGB UR QL STRIP.AUTO: NEGATIVE
HYALINE CASTS UR QL AUTO: NORMAL /LPF
IMM GRANULOCYTES # BLD AUTO: 0.03 10*3/MM3 (ref 0–0.05)
IMM GRANULOCYTES NFR BLD AUTO: 0.3 % (ref 0–0.5)
KETONES UR QL STRIP: NEGATIVE
LEUKOCYTE ESTERASE UR QL STRIP.AUTO: NEGATIVE
LYMPHOCYTES # BLD AUTO: 0.89 10*3/MM3 (ref 0.7–3.1)
LYMPHOCYTES NFR BLD AUTO: 10.2 % (ref 19.6–45.3)
MCH RBC QN AUTO: 32.1 PG (ref 26.6–33)
MCHC RBC AUTO-ENTMCNC: 33.8 G/DL (ref 31.5–35.7)
MCV RBC AUTO: 95.1 FL (ref 79–97)
MONOCYTES # BLD AUTO: 0.6 10*3/MM3 (ref 0.1–0.9)
MONOCYTES NFR BLD AUTO: 6.9 % (ref 5–12)
NEUTROPHILS NFR BLD AUTO: 6.86 10*3/MM3 (ref 1.7–7)
NEUTROPHILS NFR BLD AUTO: 78.8 % (ref 42.7–76)
NITRITE UR QL STRIP: NEGATIVE
NRBC BLD AUTO-RTO: 0 /100 WBC (ref 0–0.2)
PH UR STRIP.AUTO: 8.5 [PH] (ref 5–8)
PHOSPHATE SERPL-MCNC: 4.2 MG/DL (ref 2.5–4.5)
PLATELET # BLD AUTO: 305 10*3/MM3 (ref 140–450)
PMV BLD AUTO: 9.6 FL (ref 6–12)
POTASSIUM SERPL-SCNC: 5 MMOL/L (ref 3.5–5.2)
PROT ?TM UR-MCNC: 206.9 MG/DL
PROT UR QL STRIP: ABNORMAL
PROT/CREAT UR: 2.87 MG/G{CREAT}
RBC # BLD AUTO: 3.05 10*6/MM3 (ref 4.14–5.8)
RBC # UR STRIP: NORMAL /HPF
REF LAB TEST METHOD: NORMAL
SODIUM SERPL-SCNC: 139 MMOL/L (ref 136–145)
SP GR UR STRIP: 1.01 (ref 1–1.03)
SQUAMOUS #/AREA URNS HPF: NORMAL /HPF
UROBILINOGEN UR QL STRIP: ABNORMAL
WBC # UR STRIP: NORMAL /HPF
WBC NRBC COR # BLD AUTO: 8.71 10*3/MM3 (ref 3.4–10.8)

## 2024-10-30 PROCEDURE — 85025 COMPLETE CBC W/AUTO DIFF WBC: CPT

## 2024-10-30 PROCEDURE — 82570 ASSAY OF URINE CREATININE: CPT

## 2024-10-30 PROCEDURE — 84156 ASSAY OF PROTEIN URINE: CPT

## 2024-10-30 PROCEDURE — 80069 RENAL FUNCTION PANEL: CPT

## 2024-10-30 PROCEDURE — 36415 COLL VENOUS BLD VENIPUNCTURE: CPT

## 2024-10-30 PROCEDURE — 81001 URINALYSIS AUTO W/SCOPE: CPT

## 2024-11-21 NOTE — PROGRESS NOTES
Primary Care Provider  Allison Villafana MD   Referring Provider  No ref. provider found    Patient Complaint  Follow-up and Shortness of Breath      Subjective       History of Presenting Illness  Huy Reddy is a pleasant 84 y.o. male who presents to [facility] for follow-up appointment.  I last saw the patient 10/4/2024.  Patient has a history of aortic aneurysm, CKD stage IV, chronic diastolic congestive heart failure, pleural effusion.  Follow-up chest x-ray on 10/22/2024 showed an interval decrease in the left pleural effusion with improved aeration of the left lung base since 9/21/2024.  Patient is complaining of hard to breathe at night, feels like he has been smothered when he wakes up about every night.  He is inquiring about getting oxygen for nighttime use.  Patient states he does not need it during the day.  He states his breathing is good but he only wakes up during the middle of the night at times and feels like he is smothering it is hard to breathe.  He does not think it is sleep apnea.  He wants to pursue getting oxygen at night at this time.  Is not on any inhalers at this time for his lungs. Patient denies coughing, wheezing, headaches, chest pain, weight loss or hemoptysis. Patient denies fevers, chills and night sweats. Huy Reddy is able to perform ADLs without difficulties.    I have personally reviewed the review of systems, past family, social, medical and surgical histories; and agree with their findings.      Review of Systems   Constitutional: Negative.    HENT: Negative.     Respiratory:  Positive for shortness of breath.    Cardiovascular: Negative.    Musculoskeletal: Negative.    Neurological: Negative.    Psychiatric/Behavioral: Negative.           History reviewed. No pertinent family history.     Social History     Socioeconomic History   • Marital status:    Tobacco Use   • Smoking status: Former     Current packs/day:  0.00     Types: Cigarettes     Start date: 2016     Quit date: 2019     Years since quittin.3   • Smokeless tobacco: Never   Vaping Use   • Vaping status: Never Used   Substance and Sexual Activity   • Alcohol use: Not Currently   • Drug use: Never   • Sexual activity: Defer        Past Medical History:   Diagnosis Date   • Aortic aneurysm    • Chronic renal failure    • Hyperlipidemia    • Hypertension    • Monoclonal gammopathy    • Peripheral vascular disease    • Renal disorder    • Skin cancer    • TIA (transient ischemic attack)    • Vitamin D deficiency         Immunization History   Administered Date(s) Administered   • COVID-19 (MODERNA) 1st,2nd,3rd Dose Monovalent 2021, 2021   • COVID-19 (MODERNA) Monovalent Original Booster 2021   • Fluad Quad 65+ 10/01/2020, 2021   • Fluzone High-Dose 65+YRS 2016, 2017, 10/16/2019   • Fluzone High-Dose 65+yrs 2022   • Pneumococcal Conjugate 13-Valent (PCV13) 2016   • Pneumococcal Polysaccharide (PPSV23) 2003, 2019       Allergies   Allergen Reactions   • Aspirin Other (See Comments)      thins blood          Current Outpatient Medications:   •  amLODIPine (NORVASC) 10 MG tablet, Take 1 tablet by mouth Daily., Disp: , Rfl:   •  famotidine (PEPCID) 20 MG tablet, Take 1 tablet by mouth Every Morning., Disp: , Rfl:   •  hydrALAZINE (APRESOLINE) 25 MG tablet, Take 1 tablet by mouth 3 (Three) Times a Day., Disp: , Rfl:   •  Magnesium 250 MG tablet, Take 1 tablet by mouth Every Morning., Disp: , Rfl:   •  pravastatin (PRAVACHOL) 40 MG tablet, Take 1 tablet by mouth Daily., Disp: , Rfl:   •  tamsulosin (FLOMAX) 0.4 MG capsule 24 hr capsule, Take 1 capsule by mouth Daily., Disp: 10 capsule, Rfl: 0  •  albuterol sulfate  (90 Base) MCG/ACT inhaler, Inhale 2 puffs Every 4 (Four) Hours As Needed for Wheezing., Disp: 18 g, Rfl: 5  •  furosemide (Lasix) 40 MG tablet, Take 1 tablet by mouth Daily for 30  "doses., Disp: 30 tablet, Rfl: 0         Vital Signs   /73 (BP Location: Left arm, Patient Position: Sitting, Cuff Size: Adult)   Pulse 65   Temp 97.9 °F (36.6 °C)   Resp 16   Ht 182.9 cm (72\")   Wt 63.5 kg (140 lb)   SpO2 97%   BMI 18.99 kg/m²       Objective     Physical Exam  Vitals reviewed.   Constitutional:       General: He is not in acute distress.     Appearance: Normal appearance. He is not ill-appearing.   HENT:      Head: Normocephalic and atraumatic.      Nose: Nose normal.      Mouth/Throat:      Mouth: Mucous membranes are moist.      Pharynx: Oropharynx is clear.   Eyes:      Extraocular Movements: Extraocular movements intact.      Conjunctiva/sclera: Conjunctivae normal.      Pupils: Pupils are equal, round, and reactive to light.   Cardiovascular:      Rate and Rhythm: Normal rate and regular rhythm.      Pulses: Normal pulses.      Heart sounds: Normal heart sounds.   Pulmonary:      Effort: Pulmonary effort is normal. No respiratory distress.      Breath sounds: No stridor. No wheezing, rhonchi or rales.   Abdominal:      General: Bowel sounds are normal.   Musculoskeletal:         General: Normal range of motion.      Cervical back: Normal range of motion and neck supple.   Skin:     General: Skin is warm and dry.   Neurological:      Mental Status: He is alert and oriented to person, place, and time.   Psychiatric:         Behavior: Behavior normal.         Results Review  I have personally reviewed the prior office notes, hospital records, labs, and diagnostics.  XR Chest 2 View [[id number]] (Order [id number])  Order  Status: Final result     Study Notes     Jane Salmon on 10/22/2024  9:26 AM EDT   LEFT PLEURAL EFFUSION  Hx of smoking and skin CA     Appointment Information    PACS Images     Radiology Images  Study Result    Narrative & Impression   XR CHEST 2 VW-     Date of exam: 10/22/2024, 9:12 A.M.     Indications: pleural effusions; [id number] effusion, not " elsewhere  classified, imaging f/u     Comparisons: 9/21/2024; 9/20/2024; 9/19/2024; 9/18/2024; 7/28/2024;  7/19/2024.     FINDINGS:  Two views (PA and lateral upright projections) of the chest reveal  interval decrease in the left pleural effusion since 9/21/2024. A  small-to-moderate left pleural effusion persists. There is a small right  pleural effusion. No cardiac enlargement is suggested. The thoracic  aorta is atherosclerotic. Chronic calcified granulomatous disease  involves the chest. No pneumothorax is seen. No pneumomediastinum.  Degenerative changes involve the imaged spine and the bilateral  shoulders. Ossification of the anterior longitudinal ligament is seen  and may represent diffuse idiopathic skeletal hyperostosis (DISH). There  is possible persistent atelectasis and/or pneumonia in the left lung  base, predominantly in the left lower lobe. There is thought to be  interval improvement in aeration of the left lower lobe since 9/21/2024.        IMPRESSION:  Interval decrease in left pleural effusion with improved aeration of the  left lung base since 9/21/2024. Please see above comments for further  detail.        Portions of this note were completed with a voice recognition program.     Electronically Signed By-Kurt Harrison MD On:10/23/2024 4:16 AM     Assessment         Patient Active Problem List   Diagnosis   • Acute kidney injury superimposed on chronic kidney disease   • Hypertension   • Hyperlipidemia   • Hypomagnesemia   • Hypokalemia   • Hypocalcemia   • Metabolic acidosis   • Normocytic anemia   • Severe malnutrition   • Benign prostatic hyperplasia   • Chronic kidney disease, stage 4 (severe)   • Gastroesophageal reflux disease        Plan     Diagnoses and all orders for this visit:    1. Pleural effusion, bilateral (Primary)  Comments:  Resolving per chest x-ray 10/22/2024  Orders:  -     Cancel: Overnight Sleep Oximetry Study; Future  -     albuterol sulfate  (90 Base) MCG/ACT  inhaler; Inhale 2 puffs Every 4 (Four) Hours As Needed for Wheezing.  Dispense: 18 g; Refill: 5  -     Overnight Sleep Oximetry Study; Future    2. Dyspnea on exertion  Comments:  Overnight pulse oximetry ordered, albuterol inhaler ordered  Orders:  -     Cancel: Overnight Sleep Oximetry Study; Future  -     albuterol sulfate  (90 Base) MCG/ACT inhaler; Inhale 2 puffs Every 4 (Four) Hours As Needed for Wheezing.  Dispense: 18 g; Refill: 5  -     Overnight Sleep Oximetry Study; Future    3. Chronic kidney disease, stage 4 (severe)  Comments:  Follow-up with nephrology  Orders:  -     Cancel: Overnight Sleep Oximetry Study; Future  -     albuterol sulfate  (90 Base) MCG/ACT inhaler; Inhale 2 puffs Every 4 (Four) Hours As Needed for Wheezing.  Dispense: 18 g; Refill: 5  -     Overnight Sleep Oximetry Study; Future    4. Congestive heart failure, unspecified HF chronicity, unspecified heart failure type  Comments:  Follow-up with cardiology  Orders:  -     Cancel: Overnight Sleep Oximetry Study; Future  -     albuterol sulfate  (90 Base) MCG/ACT inhaler; Inhale 2 puffs Every 4 (Four) Hours As Needed for Wheezing.  Dispense: 18 g; Refill: 5  -     Overnight Sleep Oximetry Study; Future    5. Nicotine dependence, cigarettes, in remission  Comments:  Patient is not a candidate for low-dose CT lung cancer screenings due to age  Orders:  -     Cancel: Overnight Sleep Oximetry Study; Future  -     albuterol sulfate  (90 Base) MCG/ACT inhaler; Inhale 2 puffs Every 4 (Four) Hours As Needed for Wheezing.  Dispense: 18 g; Refill: 5  -     Overnight Sleep Oximetry Study; Future          Smoking status:  reports that he quit smoking about 5 years ago. His smoking use included cigarettes. He started smoking about 8 years ago. He has never used smokeless tobacco.    Vaccination status: Reviewed  Immunization History   Administered Date(s) Administered   • COVID-19 (MODERNA) 1st,2nd,3rd Dose Monovalent  02/08/2021, 03/11/2021   • COVID-19 (MODERNA) Monovalent Original Booster 12/30/2021   • Fluad Quad 65+ 10/01/2020, 11/01/2021   • Fluzone High-Dose 65+YRS 12/30/2016, 09/18/2017, 10/16/2019   • Fluzone High-Dose 65+yrs 11/22/2022   • Pneumococcal Conjugate 13-Valent (PCV13) 06/22/2016   • Pneumococcal Polysaccharide (PPSV23) 01/01/2003, 03/18/2019        Medications personally reviewed    Follow Up  Return in about 2 months (around 1/22/2025).    Patient was given instructions and counseling regarding his condition or for health maintenance advice. Please see specific information pulled into the AVS if appropriate.     I spent 20 minutes caring for Huy Reddy on this date of service. This time includes time spent by me in the following activities:preparing for the visit, reviewing tests, obtaining and/or reviewing a separately obtained history, performing a medically appropriate examination and/or evaluation, counseling and educating the patient/family/caregiver, ordering medications, tests, or procedures, documenting information in the medical record, independently interpreting results and communicating that information with the patient/family/caregiver and answered questions family members, discuss medications.

## 2024-11-22 ENCOUNTER — OFFICE VISIT (OUTPATIENT)
Dept: PULMONOLOGY | Facility: CLINIC | Age: 84
End: 2024-11-22
Payer: MEDICARE

## 2024-11-22 VITALS
HEART RATE: 65 BPM | DIASTOLIC BLOOD PRESSURE: 73 MMHG | RESPIRATION RATE: 16 BRPM | OXYGEN SATURATION: 97 % | HEIGHT: 72 IN | SYSTOLIC BLOOD PRESSURE: 164 MMHG | WEIGHT: 140 LBS | BODY MASS INDEX: 18.96 KG/M2 | TEMPERATURE: 97.9 F

## 2024-11-22 DIAGNOSIS — I50.9 CONGESTIVE HEART FAILURE, UNSPECIFIED HF CHRONICITY, UNSPECIFIED HEART FAILURE TYPE: ICD-10-CM

## 2024-11-22 DIAGNOSIS — F17.211 NICOTINE DEPENDENCE, CIGARETTES, IN REMISSION: ICD-10-CM

## 2024-11-22 DIAGNOSIS — J90 PLEURAL EFFUSION, BILATERAL: Primary | ICD-10-CM

## 2024-11-22 DIAGNOSIS — N18.4 CHRONIC KIDNEY DISEASE, STAGE 4 (SEVERE): ICD-10-CM

## 2024-11-22 DIAGNOSIS — R06.09 DYSPNEA ON EXERTION: ICD-10-CM

## 2024-11-22 PROCEDURE — 99214 OFFICE O/P EST MOD 30 MIN: CPT | Performed by: NURSE PRACTITIONER

## 2024-11-22 PROCEDURE — 3077F SYST BP >= 140 MM HG: CPT | Performed by: NURSE PRACTITIONER

## 2024-11-22 PROCEDURE — 3078F DIAST BP <80 MM HG: CPT | Performed by: NURSE PRACTITIONER

## 2024-11-22 RX ORDER — ALBUTEROL SULFATE 90 UG/1
2 INHALANT RESPIRATORY (INHALATION) EVERY 4 HOURS PRN
Qty: 18 G | Refills: 5 | Status: SHIPPED | OUTPATIENT
Start: 2024-11-22

## 2024-12-02 ENCOUNTER — HOSPITAL ENCOUNTER (EMERGENCY)
Facility: HOSPITAL | Age: 84
Discharge: HOME | End: 2024-12-02
Attending: EMERGENCY MEDICINE | Admitting: EMERGENCY MEDICINE
Payer: MEDICARE

## 2024-12-02 ENCOUNTER — TELEPHONE (OUTPATIENT)
Dept: PULMONOLOGY | Facility: CLINIC | Age: 84
End: 2024-12-02
Payer: MEDICARE

## 2024-12-02 ENCOUNTER — APPOINTMENT (OUTPATIENT)
Dept: GENERAL RADIOLOGY | Facility: HOSPITAL | Age: 84
End: 2024-12-02
Payer: MEDICARE

## 2024-12-02 VITALS
DIASTOLIC BLOOD PRESSURE: 64 MMHG | HEART RATE: 83 BPM | HEIGHT: 72 IN | WEIGHT: 139.99 LBS | SYSTOLIC BLOOD PRESSURE: 173 MMHG | OXYGEN SATURATION: 93 % | TEMPERATURE: 98.4 F | RESPIRATION RATE: 15 BRPM | BODY MASS INDEX: 18.96 KG/M2

## 2024-12-02 DIAGNOSIS — R06.00 ACUTE DYSPNEA: ICD-10-CM

## 2024-12-02 DIAGNOSIS — J90 CHRONIC BILATERAL PLEURAL EFFUSIONS: Primary | ICD-10-CM

## 2024-12-02 LAB
ALBUMIN SERPL-MCNC: 3.5 G/DL (ref 3.5–5.2)
ALBUMIN/GLOB SERPL: 1.1 G/DL
ALP SERPL-CCNC: 88 U/L (ref 39–117)
ALT SERPL W P-5'-P-CCNC: 8 U/L (ref 1–41)
ANION GAP SERPL CALCULATED.3IONS-SCNC: 15.3 MMOL/L (ref 5–15)
AST SERPL-CCNC: 15 U/L (ref 1–40)
BASOPHILS # BLD AUTO: 0.06 10*3/MM3 (ref 0–0.2)
BASOPHILS NFR BLD AUTO: 0.6 % (ref 0–1.5)
BILIRUB SERPL-MCNC: 0.2 MG/DL (ref 0–1.2)
BUN SERPL-MCNC: 46 MG/DL (ref 8–23)
BUN/CREAT SERPL: 8.7 (ref 7–25)
CALCIUM SPEC-SCNC: 8.9 MG/DL (ref 8.6–10.5)
CHLORIDE SERPL-SCNC: 103 MMOL/L (ref 98–107)
CO2 SERPL-SCNC: 22.7 MMOL/L (ref 22–29)
CREAT SERPL-MCNC: 5.28 MG/DL (ref 0.76–1.27)
DEPRECATED RDW RBC AUTO: 50.5 FL (ref 37–54)
EGFRCR SERPLBLD CKD-EPI 2021: 10.1 ML/MIN/1.73
EOSINOPHIL # BLD AUTO: 0.15 10*3/MM3 (ref 0–0.4)
EOSINOPHIL NFR BLD AUTO: 1.4 % (ref 0.3–6.2)
ERYTHROCYTE [DISTWIDTH] IN BLOOD BY AUTOMATED COUNT: 14.6 % (ref 12.3–15.4)
GEN 5 2HR TROPONIN T REFLEX: 96 NG/L
GLOBULIN UR ELPH-MCNC: 3.1 GM/DL
GLUCOSE SERPL-MCNC: 112 MG/DL (ref 65–99)
HCT VFR BLD AUTO: 27.6 % (ref 37.5–51)
HGB BLD-MCNC: 8.8 G/DL (ref 13–17.7)
HOLD SPECIMEN: NORMAL
HOLD SPECIMEN: NORMAL
IMM GRANULOCYTES # BLD AUTO: 0.04 10*3/MM3 (ref 0–0.05)
IMM GRANULOCYTES NFR BLD AUTO: 0.4 % (ref 0–0.5)
LYMPHOCYTES # BLD AUTO: 0.34 10*3/MM3 (ref 0.7–3.1)
LYMPHOCYTES NFR BLD AUTO: 3.2 % (ref 19.6–45.3)
MCH RBC QN AUTO: 30.2 PG (ref 26.6–33)
MCHC RBC AUTO-ENTMCNC: 31.9 G/DL (ref 31.5–35.7)
MCV RBC AUTO: 94.8 FL (ref 79–97)
MONOCYTES # BLD AUTO: 0.62 10*3/MM3 (ref 0.1–0.9)
MONOCYTES NFR BLD AUTO: 5.9 % (ref 5–12)
NEUTROPHILS NFR BLD AUTO: 88.5 % (ref 42.7–76)
NEUTROPHILS NFR BLD AUTO: 9.36 10*3/MM3 (ref 1.7–7)
NRBC BLD AUTO-RTO: 0 /100 WBC (ref 0–0.2)
NT-PROBNP SERPL-MCNC: ABNORMAL PG/ML (ref 0–1800)
PLATELET # BLD AUTO: 270 10*3/MM3 (ref 140–450)
PMV BLD AUTO: 9.7 FL (ref 6–12)
POTASSIUM SERPL-SCNC: 4.6 MMOL/L (ref 3.5–5.2)
PROT SERPL-MCNC: 6.6 G/DL (ref 6–8.5)
RBC # BLD AUTO: 2.91 10*6/MM3 (ref 4.14–5.8)
SODIUM SERPL-SCNC: 141 MMOL/L (ref 136–145)
TROPONIN T DELTA: -3 NG/L
TROPONIN T SERPL HS-MCNC: 99 NG/L
WBC NRBC COR # BLD AUTO: 10.57 10*3/MM3 (ref 3.4–10.8)
WHOLE BLOOD HOLD COAG: NORMAL
WHOLE BLOOD HOLD SPECIMEN: NORMAL

## 2024-12-02 PROCEDURE — 84484 ASSAY OF TROPONIN QUANT: CPT

## 2024-12-02 PROCEDURE — 93005 ELECTROCARDIOGRAM TRACING: CPT | Performed by: EMERGENCY MEDICINE

## 2024-12-02 PROCEDURE — 85025 COMPLETE CBC W/AUTO DIFF WBC: CPT

## 2024-12-02 PROCEDURE — 84484 ASSAY OF TROPONIN QUANT: CPT | Performed by: EMERGENCY MEDICINE

## 2024-12-02 PROCEDURE — 71045 X-RAY EXAM CHEST 1 VIEW: CPT

## 2024-12-02 PROCEDURE — 99284 EMERGENCY DEPT VISIT MOD MDM: CPT

## 2024-12-02 PROCEDURE — 83880 ASSAY OF NATRIURETIC PEPTIDE: CPT

## 2024-12-02 PROCEDURE — 80053 COMPREHEN METABOLIC PANEL: CPT

## 2024-12-02 PROCEDURE — 36415 COLL VENOUS BLD VENIPUNCTURE: CPT

## 2024-12-02 PROCEDURE — 93005 ELECTROCARDIOGRAM TRACING: CPT

## 2024-12-02 RX ORDER — SODIUM CHLORIDE 0.9 % (FLUSH) 0.9 %
10 SYRINGE (ML) INJECTION AS NEEDED
Status: DISCONTINUED | OUTPATIENT
Start: 2024-12-02 | End: 2024-12-02 | Stop reason: HOSPADM

## 2024-12-02 RX ORDER — ALBUTEROL SULFATE 1.25 MG/3ML
1 SOLUTION RESPIRATORY (INHALATION) EVERY 6 HOURS PRN
Qty: 24 EACH | Refills: 0 | Status: SHIPPED | OUTPATIENT
Start: 2024-12-02

## 2024-12-02 RX ORDER — ALBUTEROL SULFATE 1.25 MG/3ML
1 SOLUTION RESPIRATORY (INHALATION) EVERY 6 HOURS PRN
Qty: 24 EACH | Refills: 0 | Status: SHIPPED | OUTPATIENT
Start: 2024-12-02 | End: 2024-12-02

## 2024-12-02 NOTE — ED PROVIDER NOTES
Time: 4:26 PM EST  Date of encounter:  12/2/2024  Independent Historian/Clinical History and Information was obtained by:   Patient and Family    History is limited by: N/A    Chief Complaint   Patient presents with    Shortness of Breath         History of Present Illness:  Patient is a 84 y.o. year old male who presents to the emergency department for evaluation of shortness of breath.  Symptoms have been going on for about 2 weeks.  States that he was recently admitted for pneumonia and had thoracentesis done. He has a history of chronic pleural effusions secondary to  anasarca / CKD. Denies any fever. (Triage Provider: Ward Tavarez PA-C).      Patient Care Team  Primary Care Provider: Allison Villafana MD    Past Medical History:     Allergies   Allergen Reactions    Aspirin Other (See Comments)      thins blood     Past Medical History:   Diagnosis Date    Aortic aneurysm     Chronic renal failure     Hyperlipidemia     Hypertension     Monoclonal gammopathy     Peripheral vascular disease     Renal disorder     Skin cancer     TIA (transient ischemic attack)     Vitamin D deficiency      Past Surgical History:   Procedure Laterality Date    TONSILLECTOMY       History reviewed. No pertinent family history.    Home Medications:  Prior to Admission medications    Medication Sig Start Date End Date Taking? Authorizing Provider   albuterol sulfate  (90 Base) MCG/ACT inhaler Inhale 2 puffs Every 4 (Four) Hours As Needed for Wheezing. 11/22/24   Aracelis Larios APRN   amLODIPine (NORVASC) 10 MG tablet Take 1 tablet by mouth Daily.    ProviderCholo MD   famotidine (PEPCID) 20 MG tablet Take 1 tablet by mouth Every Morning.    ProviderCholo MD   furosemide (Lasix) 40 MG tablet Take 1 tablet by mouth Daily for 30 doses. 9/20/24 10/20/24  Melody Butler MD   hydrALAZINE (APRESOLINE) 25 MG tablet Take 1 tablet by mouth 3 (Three) Times a Day.    ProviderCholo MD   Magnesium 250 MG tablet  "Take 1 tablet by mouth Every Morning.    Provider, Cholo, MD   pravastatin (PRAVACHOL) 40 MG tablet Take 1 tablet by mouth Daily.    Provider, Cholo, MD   tamsulosin (FLOMAX) 0.4 MG capsule 24 hr capsule Take 1 capsule by mouth Daily. 23   Alex Cohen DO        Social History:   Social History     Tobacco Use    Smoking status: Former     Current packs/day: 0.00     Types: Cigarettes     Start date: 2016     Quit date: 2019     Years since quittin.3    Smokeless tobacco: Never   Vaping Use    Vaping status: Never Used   Substance Use Topics    Alcohol use: Not Currently    Drug use: Never         Review of Systems:  Review of Systems   Constitutional:  Negative for chills and fever.   HENT:  Negative for congestion, ear pain and sore throat.    Eyes:  Negative for pain.   Respiratory:  Positive for shortness of breath. Negative for cough and chest tightness.    Cardiovascular:  Negative for chest pain.   Gastrointestinal:  Negative for abdominal pain, diarrhea, nausea and vomiting.   Genitourinary:  Negative for flank pain and hematuria.   Musculoskeletal:  Negative for joint swelling.   Skin:  Negative for pallor.   Neurological:  Negative for seizures and headaches.   All other systems reviewed and are negative.       Physical Exam:  /64 (BP Location: Left arm, Patient Position: Lying)   Pulse 83   Temp 98.4 °F (36.9 °C) (Oral)   Resp 15   Ht 182.9 cm (72\")   Wt 63.5 kg (139 lb 15.9 oz)   SpO2 93%   BMI 18.99 kg/m²         Physical Exam  Vitals and nursing note reviewed.   Constitutional:       General: He is not in acute distress.     Appearance: Normal appearance. He is not toxic-appearing.   HENT:      Head: Normocephalic and atraumatic.      Mouth/Throat:      Mouth: Mucous membranes are moist.   Eyes:      General: No scleral icterus.  Cardiovascular:      Rate and Rhythm: Normal rate and regular rhythm.      Pulses: Normal pulses.      Heart sounds: Normal " heart sounds.   Pulmonary:      Effort: Pulmonary effort is normal. No respiratory distress.      Breath sounds: Examination of the right-middle field reveals decreased breath sounds. Examination of the right-lower field reveals decreased breath sounds. Examination of the left-lower field reveals decreased breath sounds. Decreased breath sounds present.   Abdominal:      General: Abdomen is flat.      Palpations: Abdomen is soft.      Tenderness: There is no abdominal tenderness.   Musculoskeletal:         General: Normal range of motion.      Cervical back: Normal range of motion and neck supple.   Skin:     General: Skin is warm and dry.   Neurological:      Mental Status: He is alert and oriented to person, place, and time. Mental status is at baseline.                      Procedures:  Procedures      Medical Decision Making:      Comorbidities that affect care:    Chronic Kidney Disease    External Notes reviewed:    Previous Clinic Note: pulmonary clinic visit      The following orders were placed and all results were independently analyzed by me:  Orders Placed This Encounter   Procedures    Home Nebulizer    XR Chest 1 View    Stanley Draw    Comprehensive Metabolic Panel    BNP    Single High Sensitivity Troponin T    CBC Auto Differential    High Sensitivity Troponin T 2Hr    Undress & Gown    Continuous Pulse Oximetry    Vital Signs    ECG 12 Lead ED Triage Standing Order; SOA    CBC & Differential    Green Top (Gel)    Lavender Top    Gold Top - SST    Light Blue Top       Medications Given in the Emergency Department:  Medications - No data to display       ED Course:    The patient was initially evaluated in the triage area where orders were placed. The patient was later dispositioned by Alex Cohen DO.      The patient was advised to stay for completion of workup which includes but is not limited to communication of labs and radiological results, reassessment and plan. The patient was advised  that leaving prior to disposition by a provider could result in critical findings that are not communicated to the patient.            EKG: sinus rhythm with a rate of 80 BPM  No acute ischemia  Labs:    Lab Results (last 24 hours)       Procedure Component Value Units Date/Time    CBC & Differential [[id number]]  (Abnormal) Collected: 12/02/24 1628    Specimen: Blood from Arm, Right Updated: 12/02/24 1647    Narrative:      The following orders were created for panel order CBC & Differential.  Procedure                               Abnormality         Status                     ---------                               -----------         ------                     CBC Auto Differential[[id number]]        Abnormal            Final result                 Please view results for these tests on the individual orders.    Comprehensive Metabolic Panel [[id number]]  (Abnormal) Collected: 12/02/24 1628    Specimen: Blood from Arm, Right Updated: 12/02/24 1707     Glucose 112 mg/dL      BUN 46 mg/dL      Creatinine 5.28 mg/dL      Sodium 141 mmol/L      Potassium 4.6 mmol/L      Chloride 103 mmol/L      CO2 22.7 mmol/L      Calcium 8.9 mg/dL      Total Protein 6.6 g/dL      Albumin 3.5 g/dL      ALT (SGPT) 8 U/L      AST (SGOT) 15 U/L      Alkaline Phosphatase 88 U/L      Total Bilirubin 0.2 mg/dL      Globulin 3.1 gm/dL      A/G Ratio 1.1 g/dL      BUN/Creatinine Ratio 8.7     Anion Gap 15.3 mmol/L      eGFR 10.1 mL/min/1.73      Comment: <15 Indicative of kidney failure       Narrative:      GFR Normal >60  Chronic Kidney Disease <60  Kidney Failure <15    The GFR formula is only valid for adults with stable renal function between ages 18 and 70.    BNP [[id number]]  (Abnormal) Collected: 12/02/24 1628    Specimen: Blood from Arm, Right Updated: 12/02/24 1718     proBNP 31,789.0 pg/mL     Narrative:      This assay is used as an aid in the diagnosis of individuals suspected of having heart failure. It can be used as an aid in  the diagnosis of acute decompensated heart failure (ADHF) in patients presenting with signs and symptoms of ADHF to the emergency department (ED). In addition, NT-proBNP of <300 pg/mL indicates ADHF is not likely.    Age Range Result Interpretation  NT-proBNP Concentration (pg/mL:      <50             Positive            >450                   Gray                 300-450                    Negative             <300    50-75           Positive            >900                  Gray                300-900                  Negative            <300      >75             Positive            >1800                  Gray                300-1800                  Negative            <300    Single High Sensitivity Troponin T [[id number]]  (Abnormal) Collected: 12/02/24 1628    Specimen: Blood from Arm, Right Updated: 12/02/24 1724     HS Troponin T 99 ng/L     Narrative:      High Sensitive Troponin T Reference Range:  <14.0 ng/L- Negative Female for AMI  <22.0 ng/L- Negative Male for AMI  >=14 - Abnormal Female indicating possible myocardial injury.  >=22 - Abnormal Male indicating possible myocardial injury.   Clinicians would have to utilize clinical acumen, EKG, Troponin, and serial changes to determine if it is an Acute Myocardial Infarction or myocardial injury due to an underlying chronic condition.         CBC Auto Differential [[id number]]  (Abnormal) Collected: 12/02/24 1628    Specimen: Blood from Arm, Right Updated: 12/02/24 1647     WBC 10.57 10*3/mm3      RBC 2.91 10*6/mm3      Hemoglobin 8.8 g/dL      Hematocrit 27.6 %      MCV 94.8 fL      MCH 30.2 pg      MCHC 31.9 g/dL      RDW 14.6 %      RDW-SD 50.5 fl      MPV 9.7 fL      Platelets 270 10*3/mm3      Neutrophil % 88.5 %      Lymphocyte % 3.2 %      Monocyte % 5.9 %      Eosinophil % 1.4 %      Basophil % 0.6 %      Immature Grans % 0.4 %      Neutrophils, Absolute 9.36 10*3/mm3      Lymphocytes, Absolute 0.34 10*3/mm3      Monocytes, Absolute 0.62 10*3/mm3       Eosinophils, Absolute 0.15 10*3/mm3      Basophils, Absolute 0.06 10*3/mm3      Immature Grans, Absolute 0.04 10*3/mm3      nRBC 0.0 /100 WBC     High Sensitivity Troponin T 2Hr [[id number]]  (Abnormal) Collected: 12/02/24 1901    Specimen: Blood Updated: 12/02/24 2003     HS Troponin T 96 ng/L      Troponin T Delta -3 ng/L     Narrative:      High Sensitive Troponin T Reference Range:  <14.0 ng/L- Negative Female for AMI  <22.0 ng/L- Negative Male for AMI  >=14 - Abnormal Female indicating possible myocardial injury.  >=22 - Abnormal Male indicating possible myocardial injury.   Clinicians would have to utilize clinical acumen, EKG, Troponin, and serial changes to determine if it is an Acute Myocardial Infarction or myocardial injury due to an underlying chronic condition.                  Imaging:    XR Chest 1 View    Result Date: 12/2/2024  XR CHEST 1 VW Date of Exam: 12/2/2024 4:51 PM EST Indication: SOA Triage Protocol Comparison: Chest radiograph 10/22/2024 Findings: Mediastinum: Cardiomediastinal silhouette appears normal in size. Lungs: Left greater than right basal opacities. Pleura: Increased moderate left and similar small right pleural effusion. No pneumothorax. Bones and soft tissues: No acute osseous abnormality.     Impression: Increased moderate left and similar small right pleural effusions. Left greater than right basal opacities, likely atelectasis. Superimposed infection to be excluded clinically. Electronically Signed: Yogesh Verduzco  12/2/2024 5:01 PM EST  Workstation ID: [id number]       Differential Diagnosis and Discussion:      Dyspnea: Differential diagnosis includes but is not limited to metabolic acidosis, neurological disorders, psychogenic, asthma, pneumothorax, upper airway obstruction, COPD, pneumonia, noncardiogenic pulmonary edema, interstitial lung disease, anemia, congestive heart failure, and pulmonary embolism    All labs were reviewed and interpreted by me.  All X-rays  impressions were independently interpreted by me.  EKG was interpreted by me.    MDM                   Patient Care Considerations:    CT CHEST: I considered ordering a CT scan of the chest, however the patient has known pleural effusions      Consultants/Shared Management Plan:    Consultant: I have discussed the case with Dr Esparza who states the patient can follow up in office for outpatient thoracentesis    Social Determinants of Health:    Patient has presented with family members who are responsible, reliable and will ensure follow up care.      Disposition and Care Coordination:    Discharged: I considered escalation of care by admitting this patient to the hospital, however the patient prefers to have further procedures performed as an outpatient and he is stable and oxygenating well    I have explained discharge medications and the need for follow up with the patient/caretakers. This was also printed in the discharge instructions. Patient was discharged with the following medications and follow up:      Medication List        Changed      * albuterol sulfate  (90 Base) MCG/ACT inhaler  Commonly known as: PROVENTIL HFA;VENTOLIN HFA;PROAIR HFA  Inhale 2 puffs Every 4 (Four) Hours As Needed for Wheezing.  What changed: Another medication with the same name was added. Make sure you understand how and when to take each.     * albuterol 1.25 MG/3ML nebulizer solution  Commonly known as: ACCUNEB  Take 3 mL by nebulization Every 6 (Six) Hours As Needed for Shortness of Air.  What changed: You were already taking a medication with the same name, and this prescription was added. Make sure you understand how and when to take each.           * This list has 2 medication(s) that are the same as other medications prescribed for you. Read the directions carefully, and ask your doctor or other care provider to review them with you.                   Where to Get Your Medications        These medications were sent to  [id number] - [place], [place] - [address] - [phone]  - [phone]  [address] [place] [address]      Hours: 24-hours Phone: [phone]   albuterol 1.25 MG/3ML nebulizer solution      Eb Esparza MD  [address]  [address]  [facility] [address] [phone]    In 1 day  call for apointrment       Final diagnoses:   Chronic bilateral pleural effusions   Acute dyspnea        ED Disposition       ED Disposition   Discharge    Condition   Stable    Comment   --               This medical record created using voice recognition software.             Alex Cohen, DO  12/03/24 1456

## 2024-12-02 NOTE — TELEPHONE ENCOUNTER
Patients daughter Candida (on verbal) called and stated that he is needing oxygen. They wanted to let Aracelis know that his oxygen is 93 but he is having a hard time breathing. They are scared that fluid is beginning to fill in his lugs. Please advise

## 2024-12-02 NOTE — TELEPHONE ENCOUNTER
Called patient to inquire as to his concern of needing oxygen. He states he had the overnight pulse oximetry test done 11/26/2024, results not back yet.  Patient states his oxygen saturation has been in the 90% to 93% on room air.  But he is having a difficulty time breathing.  He is concerned that his lungs may be filling up with fluid.  I offered to send patient to get a chest x-ray but he stated he needed some relief now.  I have advised the patient to go to the emergency room for further evaluation as he felt he was struggling with his breathing.

## 2024-12-03 ENCOUNTER — DOCUMENTATION (OUTPATIENT)
Dept: PULMONOLOGY | Facility: CLINIC | Age: 84
End: 2024-12-03
Payer: MEDICARE

## 2024-12-03 ENCOUNTER — TELEPHONE (OUTPATIENT)
Dept: PULMONOLOGY | Facility: CLINIC | Age: 84
End: 2024-12-03
Payer: MEDICARE

## 2024-12-03 DIAGNOSIS — J90 PLEURAL EFFUSION: ICD-10-CM

## 2024-12-03 DIAGNOSIS — J90 PLEURAL EFFUSION, BILATERAL: ICD-10-CM

## 2024-12-03 NOTE — PROGRESS NOTES
Spoke with patient regarding chest x-ray result from emergency room yesterday and elevated BNP.  Patient stating he needs a thoracentesis for the left-sided pleural effusion.  Reached out to Dr. Esparza who confirm patient needs left-sided thoracentesis.  Patient will be set up for Thursday for left-sided thoracentesis by Dr. Sarabia.  Patient notified and verbalized understanding for Thursday.  I have encouraged patient to reach out to his cardiologist.  However patient refuses to see a cardiologist for management of his CHF.

## 2024-12-03 NOTE — TELEPHONE ENCOUNTER
Caller: Huy Reddy    Relationship to patient: Self    Best call back number:     [phone] (Mobile)       Patient is needing: PT SAID THEY WERE JUST IN HOSPITAL AND WAS TOLD TO CONTACT US TO GET AN INFUSION SET UP

## 2024-12-04 DIAGNOSIS — J90 PLEURAL EFFUSION: Primary | ICD-10-CM

## 2024-12-05 ENCOUNTER — HOSPITAL ENCOUNTER (OUTPATIENT)
Dept: INFUSION THERAPY | Facility: HOSPITAL | Age: 84
Discharge: HOME | End: 2024-12-05
Attending: INTERNAL MEDICINE | Admitting: INTERNAL MEDICINE
Payer: MEDICARE

## 2024-12-05 ENCOUNTER — APPOINTMENT (OUTPATIENT)
Dept: GENERAL RADIOLOGY | Facility: HOSPITAL | Age: 84
End: 2024-12-05
Payer: MEDICARE

## 2024-12-05 VITALS
BODY MASS INDEX: 21.66 KG/M2 | RESPIRATION RATE: 15 BRPM | WEIGHT: 130 LBS | HEIGHT: 65 IN | SYSTOLIC BLOOD PRESSURE: 150 MMHG | OXYGEN SATURATION: 99 % | TEMPERATURE: 97.6 F | HEART RATE: 76 BPM | DIASTOLIC BLOOD PRESSURE: 53 MMHG

## 2024-12-05 DIAGNOSIS — J90 PLEURAL EFFUSION: ICD-10-CM

## 2024-12-05 LAB
ALBUMIN FLD-MCNC: 1.1 G/DL
ALBUMIN FLD-MCNC: 1.2 G/DL
APPEARANCE FLD: CLEAR
APPEARANCE FLD: CLEAR
CHOLESTEROL FLUID: 16 MG/DL
CHOLESTEROL FLUID: 21 MG/DL
COLOR FLD: NORMAL
COLOR FLD: NORMAL
GLUCOSE FLD-MCNC: 100 MG/DL
LDH FLD-CCNC: 54 U/L
LDH FLD-CCNC: 60 U/L
LYMPHOCYTES NFR FLD MANUAL: 60 %
LYMPHOCYTES NFR FLD MANUAL: 65 %
MESOTHL CELL NFR FLD MANUAL: 1 %
MESOTHL CELL NFR FLD MANUAL: 4 %
MONOCYTES NFR FLD: 22 %
MONOCYTES NFR FLD: 32 %
NEUTROPHILS NFR FLD MANUAL: 12 %
NEUTROPHILS NFR FLD MANUAL: 4 %
NUC CELL # FLD: 47 /MM3
NUC CELL # FLD: 90 /MM3
PH FLD: 6.5 [PH]
PROT FLD-MCNC: 1.6 G/DL
PROT FLD-MCNC: 1.9 G/DL
QT INTERVAL: 425 MS
QTC INTERVAL: 491 MS
RBC # FLD AUTO: <2000 /MM3
RBC # FLD AUTO: <2000 /MM3
TRIGL FLD-MCNC: <9 MG/DL

## 2024-12-05 PROCEDURE — 32555 ASPIRATE PLEURA W/ IMAGING: CPT | Performed by: INTERNAL MEDICINE

## 2024-12-05 PROCEDURE — 82945 GLUCOSE OTHER FLUID: CPT | Performed by: INTERNAL MEDICINE

## 2024-12-05 PROCEDURE — 87102 FUNGUS ISOLATION CULTURE: CPT | Performed by: INTERNAL MEDICINE

## 2024-12-05 PROCEDURE — 84311 SPECTROPHOTOMETRY: CPT | Performed by: INTERNAL MEDICINE

## 2024-12-05 PROCEDURE — 84157 ASSAY OF PROTEIN OTHER: CPT | Performed by: INTERNAL MEDICINE

## 2024-12-05 PROCEDURE — 82042 OTHER SOURCE ALBUMIN QUAN EA: CPT | Performed by: INTERNAL MEDICINE

## 2024-12-05 PROCEDURE — 88108 CYTOPATH CONCENTRATE TECH: CPT | Performed by: INTERNAL MEDICINE

## 2024-12-05 PROCEDURE — 83986 ASSAY PH BODY FLUID NOS: CPT | Performed by: INTERNAL MEDICINE

## 2024-12-05 PROCEDURE — 84478 ASSAY OF TRIGLYCERIDES: CPT | Performed by: INTERNAL MEDICINE

## 2024-12-05 PROCEDURE — 87206 SMEAR FLUORESCENT/ACID STAI: CPT | Performed by: INTERNAL MEDICINE

## 2024-12-05 PROCEDURE — 87075 CULTR BACTERIA EXCEPT BLOOD: CPT | Performed by: INTERNAL MEDICINE

## 2024-12-05 PROCEDURE — 87205 SMEAR GRAM STAIN: CPT | Performed by: INTERNAL MEDICINE

## 2024-12-05 PROCEDURE — 71045 X-RAY EXAM CHEST 1 VIEW: CPT

## 2024-12-05 PROCEDURE — 83615 LACTATE (LD) (LDH) ENZYME: CPT | Performed by: INTERNAL MEDICINE

## 2024-12-05 PROCEDURE — 87116 MYCOBACTERIA CULTURE: CPT | Performed by: INTERNAL MEDICINE

## 2024-12-05 PROCEDURE — 87070 CULTURE OTHR SPECIMN AEROBIC: CPT | Performed by: INTERNAL MEDICINE

## 2024-12-05 PROCEDURE — 89051 BODY FLUID CELL COUNT: CPT | Performed by: INTERNAL MEDICINE

## 2024-12-05 NOTE — NURSING NOTE
Pt to 3w for ultrasound guided thoracentesis of left/right side with Dr. Sarabia. Consent signed, VSS, pt positioned at side of bed, procedure started at 1053 and ending at 1110. 1L of light yellow fluid removed from right side, 1.5L of same from left side. Specimens collected, labeled at bedside and walked to lab. Pt tolerated well with some coughing. VSS after procedure. Pt wheeled out by this nurse to POV to discharge home to self care with family.

## 2024-12-05 NOTE — PROCEDURES
Procedure name: ultrasound-guided left-sided thoracentesis     Indication - pleural effusion     This procedural risks were explained to the patient including pneumothorax requiring chest tube placement, significant bleeding requiring surgery, and infection , understanding of the risks and benefits acknowledged by the patient and family and he wish to proceed with the procedure.     Timeout performed     Procedure details  Ultrasound was use to visualize a  collection of pleural fluid, the diaphragm, and collapsed lung. At this point, the skin overlying the rib was anesthetized with 5 mL 1% lidocaine without epinephrine. A thoracentesis needle was then inserted into the pleural cavity just over top of the rib and pleural fluid was aspirated back. At this time the thoracentesis catheter was advanced to the pleural cavity over the needle.  Approximately 1500 mL of clear yellow fluid was removed. Pleural fluid was sent for analysis. Chest x-ray has been ordered.      Patient tolerated procedure well     No immediate complications    Electronically signed by Eleuterio Sarabia MD, 12/05/24, 11:22 AM EST.

## 2024-12-05 NOTE — PROCEDURES
Bedside thoracic ultrasound:     Left side: Spleen, left hemidiaphragm, left lower lobe of lung identified.  Large pleural effusion identified.  Images saved  Right side: Liver, right hemidiaphragm, right lower lobe of lung identified.  Moderate pleural effusion identified.  Images saved     Site marked for thoracentesis.        CPT 56748 with thoracentesis notes    Electronically signed by Eleuterio Sarabia MD, 12/05/24, 11:21 AM EST.

## 2024-12-05 NOTE — PROCEDURES
Procedure name: ultrasound-guided right-sided thoracentesis     Indication - pleural effusion     This procedural risks were explained to the patient including pneumothorax requiring chest tube placement, significant bleeding requiring surgery, and infection , understanding of the risks and benefits acknowledged by the patient and family and he wish to proceed with the procedure.     Timeout performed     Procedure details  Ultrasound was use to visualize a  collection of pleural fluid, the diaphragm, and collapsed lung. At this point, the skin overlying the rib was anesthetized with 5 mL 1% lidocaine without epinephrine. A thoracentesis needle was then inserted into the pleural cavity just over top of the rib and pleural fluid was aspirated back. At this time the thoracentesis catheter was advanced to the pleural cavity over the needle.  Approximately 1000 mL of clear yellow fluid was removed. Pleural fluid was sent for analysis. Chest x-ray has been ordered.      Patient tolerated procedure well     No immediate complications    Electronically signed by Eleuterio Sarabia MD, 12/05/24, 11:22 AM EST.

## 2024-12-06 LAB
NIGHT BLUE STAIN TISS: NORMAL
NIGHT BLUE STAIN TISS: NORMAL

## 2024-12-08 LAB
BACTERIA FLD CULT: NORMAL
BACTERIA FLD CULT: NORMAL
BACTERIA SPEC ANAEROBE CULT: NORMAL
BACTERIA SPEC ANAEROBE CULT: NORMAL
GRAM STN SPEC: NORMAL

## 2024-12-09 LAB
CYTO UR: NORMAL
LAB AP CASE REPORT: NORMAL
LAB AP CLINICAL INFORMATION: NORMAL
PATH REPORT.FINAL DX SPEC: NORMAL
PATH REPORT.GROSS SPEC: NORMAL

## 2024-12-10 ENCOUNTER — LAB (OUTPATIENT)
Dept: LAB | Facility: HOSPITAL | Age: 84
End: 2024-12-10
Payer: MEDICARE

## 2024-12-10 ENCOUNTER — TRANSCRIBE ORDERS (OUTPATIENT)
Dept: LAB | Facility: HOSPITAL | Age: 84
End: 2024-12-10
Payer: MEDICARE

## 2024-12-10 ENCOUNTER — HOSPITAL ENCOUNTER (OUTPATIENT)
Dept: GENERAL RADIOLOGY | Facility: HOSPITAL | Age: 84
Discharge: HOME | End: 2024-12-10
Payer: MEDICARE

## 2024-12-10 DIAGNOSIS — N18.4 CHRONIC KIDNEY DISEASE, STAGE IV (SEVERE): Primary | ICD-10-CM

## 2024-12-10 DIAGNOSIS — J90 PLEURAL EFFUSION: ICD-10-CM

## 2024-12-10 DIAGNOSIS — N18.4 CHRONIC KIDNEY DISEASE, STAGE IV (SEVERE): ICD-10-CM

## 2024-12-10 LAB
ALBUMIN SERPL-MCNC: 3.4 G/DL (ref 3.5–5.2)
ANION GAP SERPL CALCULATED.3IONS-SCNC: 14.2 MMOL/L (ref 5–15)
BACTERIA SPEC ANAEROBE CULT: NORMAL
BACTERIA SPEC ANAEROBE CULT: NORMAL
BUN SERPL-MCNC: 54 MG/DL (ref 8–23)
BUN/CREAT SERPL: 9.6 (ref 7–25)
CALCIUM SPEC-SCNC: 9.2 MG/DL (ref 8.6–10.5)
CHLORIDE SERPL-SCNC: 99 MMOL/L (ref 98–107)
CO2 SERPL-SCNC: 24.8 MMOL/L (ref 22–29)
CREAT SERPL-MCNC: 5.63 MG/DL (ref 0.76–1.27)
DEPRECATED RDW RBC AUTO: 44.4 FL (ref 37–54)
EGFRCR SERPLBLD CKD-EPI 2021: 9.4 ML/MIN/1.73
ERYTHROCYTE [DISTWIDTH] IN BLOOD BY AUTOMATED COUNT: 13.3 % (ref 12.3–15.4)
GLUCOSE SERPL-MCNC: 91 MG/DL (ref 65–99)
HCT VFR BLD AUTO: 28.4 % (ref 37.5–51)
HGB BLD-MCNC: 9.2 G/DL (ref 13–17.7)
MCH RBC QN AUTO: 30 PG (ref 26.6–33)
MCHC RBC AUTO-ENTMCNC: 32.4 G/DL (ref 31.5–35.7)
MCV RBC AUTO: 92.5 FL (ref 79–97)
PHOSPHATE SERPL-MCNC: 4.7 MG/DL (ref 2.5–4.5)
PLATELET # BLD AUTO: 381 10*3/MM3 (ref 140–450)
PMV BLD AUTO: 9.8 FL (ref 6–12)
POTASSIUM SERPL-SCNC: 4.3 MMOL/L (ref 3.5–5.2)
PTH-INTACT SERPL-MCNC: 139 PG/ML (ref 15–65)
RBC # BLD AUTO: 3.07 10*6/MM3 (ref 4.14–5.8)
SODIUM SERPL-SCNC: 138 MMOL/L (ref 136–145)
WBC NRBC COR # BLD AUTO: 9.28 10*3/MM3 (ref 3.4–10.8)

## 2024-12-10 PROCEDURE — 71045 X-RAY EXAM CHEST 1 VIEW: CPT

## 2024-12-10 PROCEDURE — 83970 ASSAY OF PARATHORMONE: CPT

## 2024-12-10 PROCEDURE — 80069 RENAL FUNCTION PANEL: CPT

## 2024-12-10 PROCEDURE — 85027 COMPLETE CBC AUTOMATED: CPT

## 2024-12-10 PROCEDURE — 36415 COLL VENOUS BLD VENIPUNCTURE: CPT

## 2024-12-12 LAB
FUNGUS WND CULT: NORMAL
FUNGUS WND CULT: NORMAL
MYCOBACTERIUM SPEC CULT: NORMAL
MYCOBACTERIUM SPEC CULT: NORMAL
NIGHT BLUE STAIN TISS: NORMAL
NIGHT BLUE STAIN TISS: NORMAL

## 2025-01-09 LAB
MYCOBACTERIUM SPEC CULT: NORMAL
MYCOBACTERIUM SPEC CULT: NORMAL
NIGHT BLUE STAIN TISS: NORMAL
NIGHT BLUE STAIN TISS: NORMAL

## 2025-01-14 ENCOUNTER — TRANSCRIBE ORDERS (OUTPATIENT)
Dept: LAB | Facility: HOSPITAL | Age: 85
End: 2025-01-14
Payer: MEDICARE

## 2025-01-14 ENCOUNTER — LAB (OUTPATIENT)
Dept: LAB | Facility: HOSPITAL | Age: 85
End: 2025-01-14
Payer: MEDICARE

## 2025-01-14 DIAGNOSIS — K21.9 CHALASIA OF LOWER ESOPHAGEAL SPHINCTER: ICD-10-CM

## 2025-01-14 DIAGNOSIS — N25.81 SECONDARY HYPERPARATHYROIDISM OF RENAL ORIGIN: ICD-10-CM

## 2025-01-14 DIAGNOSIS — N18.5 CHRONIC KIDNEY DISEASE, STAGE V: Primary | ICD-10-CM

## 2025-01-14 DIAGNOSIS — D47.2 MONOCLONAL PARAPROTEINEMIA: ICD-10-CM

## 2025-01-14 DIAGNOSIS — I12.0 MALIGNANT HYPERTENSIVE KIDNEY DISEASE WITH CHRONIC KIDNEY DISEASE STAGE V OR END STAGE RENAL DISEASE: ICD-10-CM

## 2025-01-14 DIAGNOSIS — E78.5 HYPERLIPIDEMIA, UNSPECIFIED HYPERLIPIDEMIA TYPE: ICD-10-CM

## 2025-01-14 DIAGNOSIS — E55.9 AVITAMINOSIS D: ICD-10-CM

## 2025-01-14 DIAGNOSIS — E78.5 HYPERLIPIDEMIA, UNSPECIFIED HYPERLIPIDEMIA TYPE: Primary | ICD-10-CM

## 2025-01-14 DIAGNOSIS — N40.0 ENLARGED PROSTATE: ICD-10-CM

## 2025-01-14 DIAGNOSIS — N18.5 CHRONIC KIDNEY DISEASE, STAGE V: ICD-10-CM

## 2025-01-14 LAB
25(OH)D3 SERPL-MCNC: 24.7 NG/ML (ref 30–100)
ALBUMIN SERPL-MCNC: 3.6 G/DL (ref 3.5–5.2)
ANION GAP SERPL CALCULATED.3IONS-SCNC: 13 MMOL/L (ref 5–15)
BASOPHILS # BLD AUTO: 0.04 10*3/MM3 (ref 0–0.2)
BASOPHILS NFR BLD AUTO: 0.6 % (ref 0–1.5)
BUN SERPL-MCNC: 53 MG/DL (ref 8–23)
BUN/CREAT SERPL: 8.7 (ref 7–25)
CALCIUM SPEC-SCNC: 9 MG/DL (ref 8.6–10.5)
CHLORIDE SERPL-SCNC: 102 MMOL/L (ref 98–107)
CHOLEST SERPL-MCNC: 141 MG/DL (ref 0–200)
CO2 SERPL-SCNC: 25 MMOL/L (ref 22–29)
CREAT SERPL-MCNC: 6.06 MG/DL (ref 0.76–1.27)
DEPRECATED RDW RBC AUTO: 46.9 FL (ref 37–54)
EGFRCR SERPLBLD CKD-EPI 2021: 8.6 ML/MIN/1.73
EOSINOPHIL # BLD AUTO: 0.16 10*3/MM3 (ref 0–0.4)
EOSINOPHIL NFR BLD AUTO: 2.3 % (ref 0.3–6.2)
ERYTHROCYTE [DISTWIDTH] IN BLOOD BY AUTOMATED COUNT: 13.5 % (ref 12.3–15.4)
FERRITIN SERPL-MCNC: 320 NG/ML (ref 30–400)
FOLATE SERPL-MCNC: 11.1 NG/ML (ref 4.78–24.2)
GLUCOSE SERPL-MCNC: 109 MG/DL (ref 65–99)
HCT VFR BLD AUTO: 27.6 % (ref 37.5–51)
HDLC SERPL-MCNC: 48 MG/DL (ref 40–60)
HGB BLD-MCNC: 9.2 G/DL (ref 13–17.7)
IMM GRANULOCYTES # BLD AUTO: 0.04 10*3/MM3 (ref 0–0.05)
IMM GRANULOCYTES NFR BLD AUTO: 0.6 % (ref 0–0.5)
IRON 24H UR-MRATE: 61 MCG/DL (ref 59–158)
IRON SATN MFR SERPL: 24 % (ref 20–50)
LDLC SERPL CALC-MCNC: 73 MG/DL (ref 0–100)
LDLC/HDLC SERPL: 1.47 {RATIO}
LYMPHOCYTES # BLD AUTO: 0.66 10*3/MM3 (ref 0.7–3.1)
LYMPHOCYTES NFR BLD AUTO: 9.4 % (ref 19.6–45.3)
MCH RBC QN AUTO: 31.7 PG (ref 26.6–33)
MCHC RBC AUTO-ENTMCNC: 33.3 G/DL (ref 31.5–35.7)
MCV RBC AUTO: 95.2 FL (ref 79–97)
MONOCYTES # BLD AUTO: 0.47 10*3/MM3 (ref 0.1–0.9)
MONOCYTES NFR BLD AUTO: 6.7 % (ref 5–12)
NEUTROPHILS NFR BLD AUTO: 5.68 10*3/MM3 (ref 1.7–7)
NEUTROPHILS NFR BLD AUTO: 80.4 % (ref 42.7–76)
NRBC BLD AUTO-RTO: 0 /100 WBC (ref 0–0.2)
PHOSPHATE SERPL-MCNC: 4.4 MG/DL (ref 2.5–4.5)
PLATELET # BLD AUTO: 291 10*3/MM3 (ref 140–450)
PMV BLD AUTO: 9.6 FL (ref 6–12)
POTASSIUM SERPL-SCNC: 4.5 MMOL/L (ref 3.5–5.2)
PTH-INTACT SERPL-MCNC: 124 PG/ML (ref 15–65)
RBC # BLD AUTO: 2.9 10*6/MM3 (ref 4.14–5.8)
SODIUM SERPL-SCNC: 140 MMOL/L (ref 136–145)
TIBC SERPL-MCNC: 256 MCG/DL (ref 298–536)
TRANSFERRIN SERPL-MCNC: 172 MG/DL (ref 200–360)
TRIGL SERPL-MCNC: 112 MG/DL (ref 0–150)
VIT B12 BLD-MCNC: 357 PG/ML (ref 211–946)
VLDLC SERPL-MCNC: 20 MG/DL (ref 5–40)
WBC NRBC COR # BLD AUTO: 7.05 10*3/MM3 (ref 3.4–10.8)

## 2025-01-14 PROCEDURE — 36415 COLL VENOUS BLD VENIPUNCTURE: CPT

## 2025-01-14 PROCEDURE — 83540 ASSAY OF IRON: CPT

## 2025-01-14 PROCEDURE — 82746 ASSAY OF FOLIC ACID SERUM: CPT

## 2025-01-14 PROCEDURE — 82306 VITAMIN D 25 HYDROXY: CPT

## 2025-01-14 PROCEDURE — 83970 ASSAY OF PARATHORMONE: CPT

## 2025-01-14 PROCEDURE — 82607 VITAMIN B-12: CPT

## 2025-01-14 PROCEDURE — 85025 COMPLETE CBC W/AUTO DIFF WBC: CPT

## 2025-01-14 PROCEDURE — 80069 RENAL FUNCTION PANEL: CPT

## 2025-01-14 PROCEDURE — 80061 LIPID PANEL: CPT

## 2025-01-14 PROCEDURE — 82728 ASSAY OF FERRITIN: CPT

## 2025-01-14 PROCEDURE — 84466 ASSAY OF TRANSFERRIN: CPT

## 2025-01-21 NOTE — PROGRESS NOTES
Primary Care Provider  Allison Villafana MD   Referring Provider  No ref. provider found    Patient Complaint  Follow-up (2 Months); Pleural effusion, bilateral; and Shortness of Breath    Patient or patient representative verbalized consent for the use of Ambient Listening during the visit with  RENE Patel for chart documentation. 1/24/2025  09:32 EST      Subjective       History of Presenting Illness  Huy Reddy is a pleasant 84 y.o. male  of  Dr. Sarabia who presents to [facility] with history of aortic aneurysm, CKD stage IV, chronic diastolic congestive heart failure, pleural effusion  here for followup appointment.  I saw the patient 11/22/2024.  Patient had a left-sided thoracentesis done by Dr. Sarabia for bilateral pleural effusion on 12/5/2024 cytology is returned negative for malignant cells on the right and the left pleural fluid.  1250 mL was drained from the left side with 1500 mL from the right side.      History of Present Illness  The patient presents for evaluation of breathing issues. He is accompanied by his wife.    He reports experiencing mild respiratory distress today, which is a significant improvement from his previous state. He experienced difficulty in breathing this morning but is uncertain if it was due to fluid accumulation. He has not undergone any radiographic examinations recently. His nephrologist prescribed additional medication to facilitate fluid drainage, but no x-ray was performed. He expresses a desire to have the same physician perform any necessary procedures. He does not use supplemental oxygen at home currently but believes it may be beneficial. He has not participated in a walk test since his hospitalization in 09/2024. He has been utilizing his albuterol inhaler but has discontinued the use of the nebulizer as it did not provide relief.    MEDICATIONS  albuterol       At present time patient denies coughing,  wheezing, headaches, chest pain, weight loss or hemoptysis. Patient denies fevers, chills and night sweats. Huy Reddy is able to perform ADLs.      I have personally reviewed the review of systems, past family, social, medical and surgical histories; and agree with their findings.      Review of Systems   Constitutional: Negative.    HENT: Negative.     Respiratory:  Positive for shortness of breath.    Cardiovascular: Negative.    Musculoskeletal: Negative.    Neurological: Negative.    Psychiatric/Behavioral: Negative.           History reviewed. No pertinent family history.     Social History     Socioeconomic History    Marital status:    Tobacco Use    Smoking status: Former     Current packs/day: 0.00     Types: Cigarettes     Start date: 2016     Quit date: 2019     Years since quittin.5    Smokeless tobacco: Never   Vaping Use    Vaping status: Never Used   Substance and Sexual Activity    Alcohol use: Not Currently    Drug use: Never    Sexual activity: Defer        Past Medical History:   Diagnosis Date    Aortic aneurysm     Chronic renal failure     Hyperlipidemia     Hypertension     Monoclonal gammopathy     Peripheral vascular disease     Renal disorder     Skin cancer     TIA (transient ischemic attack)     Vitamin D deficiency         Immunization History   Administered Date(s) Administered    COVID-19 (MODERNA) 1st,2nd,3rd Dose Monovalent 2021, 2021    COVID-19 (MODERNA) Monovalent Original Booster 2021    Fluad Quad 65+ 10/01/2020, 2021    Fluzone High-Dose 65+YRS 2016, 2017, 10/16/2019    Fluzone High-Dose 65+yrs 2022    Pneumococcal Conjugate 13-Valent (PCV13) 2016    Pneumococcal Polysaccharide (PPSV23) 2003, 2019       Allergies   Allergen Reactions    Aspirin Other (See Comments)      thins blood          Current Outpatient Medications:     albuterol (ACCUNEB) 1.25 MG/3ML nebulizer solution, Take 3 mL by  "nebulization Every 6 (Six) Hours As Needed for Shortness of Air., Disp: 24 each, Rfl: 0    albuterol sulfate  (90 Base) MCG/ACT inhaler, Inhale 2 puffs Every 4 (Four) Hours As Needed for Wheezing., Disp: 18 g, Rfl: 5    amLODIPine (NORVASC) 10 MG tablet, Take 1 tablet by mouth Daily., Disp: , Rfl:     famotidine (PEPCID) 20 MG tablet, Take 1 tablet by mouth Every Morning., Disp: , Rfl:     hydrALAZINE (APRESOLINE) 25 MG tablet, Take 1 tablet by mouth 3 (Three) Times a Day., Disp: , Rfl:     Magnesium 250 MG tablet, Take 1 tablet by mouth Every Morning., Disp: , Rfl:     pravastatin (PRAVACHOL) 40 MG tablet, Take 1 tablet by mouth Daily., Disp: , Rfl:     tamsulosin (FLOMAX) 0.4 MG capsule 24 hr capsule, Take 1 capsule by mouth Daily., Disp: 10 capsule, Rfl: 0    torsemide (DEMADEX) 100 MG tablet, Take 1 tablet by mouth., Disp: , Rfl:     furosemide (Lasix) 40 MG tablet, Take 1 tablet by mouth Daily for 30 doses., Disp: 30 tablet, Rfl: 0         Vital Signs   /62 (BP Location: Left arm, Patient Position: Sitting, Cuff Size: Adult)   Pulse 65   Temp 98.2 °F (36.8 °C) (Temporal)   Resp 18   Ht 165.1 cm (65\")   Wt 58.5 kg (129 lb)   SpO2 97% Comment: Room air  BMI 21.47 kg/m²       Objective     Physical Exam  Vitals reviewed.   Constitutional:       General: He is not in acute distress.     Appearance: Normal appearance. He is ill-appearing.   HENT:      Head: Normocephalic and atraumatic.      Nose: Nose normal.      Mouth/Throat:      Mouth: Mucous membranes are moist.      Pharynx: Oropharynx is clear.   Eyes:      Extraocular Movements: Extraocular movements intact.      Conjunctiva/sclera: Conjunctivae normal.      Pupils: Pupils are equal, round, and reactive to light.   Cardiovascular:      Rate and Rhythm: Normal rate and regular rhythm.      Pulses: Normal pulses.      Heart sounds: Normal heart sounds.   Pulmonary:      Effort: Pulmonary effort is normal. No respiratory distress.      " Breath sounds: Normal breath sounds. No stridor. No wheezing, rhonchi or rales.   Abdominal:      General: Bowel sounds are normal.   Musculoskeletal:         General: Normal range of motion.      Cervical back: Normal range of motion and neck supple.   Skin:     General: Skin is warm and dry.   Neurological:      Mental Status: He is alert and oriented to person, place, and time.   Psychiatric:         Behavior: Behavior normal.         Physical Exam  Good air exchange noted in the lungs.    Vital Signs  Oxygen saturation is at 97 percent.         Results Review  I have personally reviewed the prior office notes, hospital records, labs, and diagnostics.  XR Chest 1 View [[id number]] (Order [id number])  Order  Status: Final result     Study Notes     Phuong Lara on 12/5/2024 11:38 AM EST   S/P BILATERAL THORACENTESIS     Appointment Information    Display Notes    DR SEXTON                  PACS Images     Radiology Images      Study Result    Narrative & Impression   XR CHEST 1 VW     Date of Exam: 12/5/2024 11:30 AM EST     Indication: Status post bilateral thoracentesis     Comparison: 12/2/2024 radiographs     Findings:  There is decreased now trace bilateral pleural effusions. No evidence of pneumothorax. Improved aeration of the left lung base. No new focal airspace opacity. No acute osseous abnormality..     IMPRESSION:  Impression:  Decreased now trace bilateral pleural effusions. No evidence of pneumothorax.           Electronically Signed: Shreyas Reddy MD    12/5/2024 11:41 AM EST    Workstation ID: [id number]       Results  Imaging  Chest x-ray was done on 12/05/2024.          Assessment         Patient Active Problem List   Diagnosis    Acute kidney injury superimposed on chronic kidney disease    Hypertension    Hyperlipidemia    Hypomagnesemia    Hypokalemia    Hypocalcemia    Metabolic acidosis    Normocytic anemia    Severe malnutrition    Benign prostatic hyperplasia    Chronic kidney disease, stage 4  (severe)    Gastroesophageal reflux disease    Recurrent pleural effusion on left        Plan     Diagnoses and all orders for this visit:    1. Pleural effusion, bilateral (Primary)  -     XR Chest 2 View; Future    2. Dyspnea on exertion    3. Chronic kidney disease, stage 4 (severe)    4. Congestive heart failure, unspecified HF chronicity, unspecified heart failure type    5. Nicotine dependence, cigarettes, in remission         Assessment & Plan  1. Respiratory distress.  He reports some difficulty breathing today, though not as severe as before. He had a left-sided thoracentesis and a right-sided thoracentesis on 12/05/2024, draining 1250 mL from the left side and 1500 mL from the right side, with no malignancy detected. He has not had any further diagnostics since then. His oxygen saturation is 97%, and he is not currently using supplemental oxygen at home. A chest x-ray will be ordered today to assess the need for further intervention. He is advised to use his albuterol nebulizer every 4 hours if he experiences shortness of breath. If the shortness of breath is due to fluid accumulation, the goal will be to drain the fluid. The results of the chest x-ray will be communicated to him upon receipt.    PROCEDURE  The patient underwent a left-sided thoracentesis and a right-sided thoracentesis on 12/05/2024, with 1250 mL drained from the left side and 1500 mL from the right side.      Smoking status:  reports that he quit smoking about 5 years ago. His smoking use included cigarettes. He started smoking about 8 years ago. He has never used smokeless tobacco.    Vaccination status: Reviewed  Immunization History   Administered Date(s) Administered    COVID-19 (MODERNA) 1st,2nd,3rd Dose Monovalent 02/08/2021, 03/11/2021    COVID-19 (MODERNA) Monovalent Original Booster 12/30/2021    Fluad Quad 65+ 10/01/2020, 11/01/2021    Fluzone High-Dose 65+YRS 12/30/2016, 09/18/2017, 10/16/2019    Fluzone High-Dose 65+yrs  11/22/2022    Pneumococcal Conjugate 13-Valent (PCV13) 06/22/2016    Pneumococcal Polysaccharide (PPSV23) 01/01/2003, 03/18/2019        Medications personally reviewed    Follow Up  Return for Dr. Loen.    Patient was given instructions and counseling regarding his condition or for health maintenance advice. Please see specific information pulled into the AVS if appropriate.     I spent 15 minutes caring for Huy Reddy on this date of service. This time includes time spent by me in the following activities:preparing for the visit, reviewing tests, obtaining and/or reviewing a separately obtained history, performing a medically appropriate examination and/or evaluation, counseling and educating the patient/family/caregiver, ordering medications, tests, or procedures, documenting information in the medical record, independently interpreting results and communicating that information with the patient/family/caregiver and answered questions family members, discuss medications.

## 2025-01-24 ENCOUNTER — HOSPITAL ENCOUNTER (OUTPATIENT)
Facility: HOSPITAL | Age: 85
Discharge: HOME | End: 2025-01-24
Payer: MEDICARE

## 2025-01-24 ENCOUNTER — OFFICE VISIT (OUTPATIENT)
Dept: PULMONOLOGY | Facility: CLINIC | Age: 85
End: 2025-01-24
Payer: MEDICARE

## 2025-01-24 VITALS
WEIGHT: 129 LBS | DIASTOLIC BLOOD PRESSURE: 62 MMHG | OXYGEN SATURATION: 97 % | TEMPERATURE: 98.2 F | HEIGHT: 65 IN | HEART RATE: 65 BPM | SYSTOLIC BLOOD PRESSURE: 117 MMHG | RESPIRATION RATE: 18 BRPM | BODY MASS INDEX: 21.49 KG/M2

## 2025-01-24 DIAGNOSIS — R06.09 DYSPNEA ON EXERTION: ICD-10-CM

## 2025-01-24 DIAGNOSIS — I50.9 CONGESTIVE HEART FAILURE, UNSPECIFIED HF CHRONICITY, UNSPECIFIED HEART FAILURE TYPE: ICD-10-CM

## 2025-01-24 DIAGNOSIS — F17.211 NICOTINE DEPENDENCE, CIGARETTES, IN REMISSION: ICD-10-CM

## 2025-01-24 DIAGNOSIS — N18.4 CHRONIC KIDNEY DISEASE, STAGE 4 (SEVERE): ICD-10-CM

## 2025-01-24 DIAGNOSIS — J90 PLEURAL EFFUSION, BILATERAL: ICD-10-CM

## 2025-01-24 DIAGNOSIS — J90 PLEURAL EFFUSION, BILATERAL: Primary | ICD-10-CM

## 2025-01-24 PROCEDURE — 99214 OFFICE O/P EST MOD 30 MIN: CPT | Performed by: NURSE PRACTITIONER

## 2025-01-24 PROCEDURE — 71046 X-RAY EXAM CHEST 2 VIEWS: CPT

## 2025-01-24 PROCEDURE — 1160F RVW MEDS BY RX/DR IN RCRD: CPT | Performed by: NURSE PRACTITIONER

## 2025-01-24 PROCEDURE — 3078F DIAST BP <80 MM HG: CPT | Performed by: NURSE PRACTITIONER

## 2025-01-24 PROCEDURE — 3074F SYST BP LT 130 MM HG: CPT | Performed by: NURSE PRACTITIONER

## 2025-01-24 PROCEDURE — 1159F MED LIST DOCD IN RCRD: CPT | Performed by: NURSE PRACTITIONER

## 2025-01-24 RX ORDER — TORSEMIDE 100 MG/1
100 TABLET ORAL
COMMUNITY
Start: 2024-12-18 | End: 2025-12-18

## 2025-01-27 ENCOUNTER — TELEPHONE (OUTPATIENT)
Facility: HOSPITAL | Age: 85
End: 2025-01-27
Payer: MEDICARE

## 2025-01-27 DIAGNOSIS — J90 PLEURAL EFFUSION, BILATERAL: Primary | ICD-10-CM

## 2025-01-27 NOTE — TELEPHONE ENCOUNTER
Called and spoke with patients daughter she will be calling me back because Mr Reddy was asleep when I called

## 2025-01-27 NOTE — TELEPHONE ENCOUNTER
Patient's daughter called the office back in regards to patient needing a Thoracentesis. I have messaged provider to see when Thoracentesis can be scheduled.

## 2025-01-28 ENCOUNTER — APPOINTMENT (OUTPATIENT)
Dept: GENERAL RADIOLOGY | Facility: HOSPITAL | Age: 85
End: 2025-01-28
Payer: MEDICARE

## 2025-01-28 ENCOUNTER — HOSPITAL ENCOUNTER (OUTPATIENT)
Dept: INFUSION THERAPY | Facility: HOSPITAL | Age: 85
Discharge: HOME | End: 2025-01-28
Attending: INTERNAL MEDICINE | Admitting: INTERNAL MEDICINE
Payer: MEDICARE

## 2025-01-28 VITALS — DIASTOLIC BLOOD PRESSURE: 42 MMHG | SYSTOLIC BLOOD PRESSURE: 151 MMHG | HEART RATE: 65 BPM | TEMPERATURE: 97.5 F

## 2025-01-28 DIAGNOSIS — J90 PLEURAL EFFUSION, BILATERAL: ICD-10-CM

## 2025-01-28 PROCEDURE — 71045 X-RAY EXAM CHEST 1 VIEW: CPT

## 2025-01-28 NOTE — PROCEDURES
"Bedside Thoracentesis Without  Radiology    Date/Time: 1/28/2025 10:43 AM    Performed by: Chon Winchester DO  Authorized by: Chon Winchester DO  Consent: Verbal consent obtained. Written consent obtained.  Risks and benefits: risks, benefits and alternatives were discussed  Consent given by: patient  Patient understanding: patient states understanding of the procedure being performed  Patient consent: the patient's understanding of the procedure matches consent given  Procedure consent: procedure consent matches procedure scheduled  Relevant documents: relevant documents present and verified  Test results: test results available and properly labeled  Site marked: the operative site was marked  Imaging studies: imaging studies available  Patient identity confirmed: verbally with patient  Time out: Immediately prior to procedure a \"time out\" was called to verify the correct patient, procedure, equipment, support staff and site/side marked as required.  Procedure purpose: diagnostic and therapeutic  Indications: pleural effusion  Preparation: Patient was prepped and draped in the usual sterile fashion.  Local anesthesia used: yes  Anesthesia: local infiltration    Anesthesia:  Local anesthesia used: yes  Local Anesthetic: lidocaine 1% without epinephrine  Anesthetic total: 5 mL    Sedation:  Patient sedated: no    Preparation: skin prepped with ChloraPrep and sterile field established  Patient position: supported by bedside stand  Ultrasound guidance: yes  Location: right posterior  Puncture method: over-the-needle catheter  Number of attempts: 1  Drainage amount: 1200 ml  Patient tolerance: patient tolerated the procedure well with no immediate complications  Chest x-ray performed: yes  Chest x-ray interpreted by me.      "

## 2025-01-28 NOTE — PROCEDURES
Bedside Thoracentesis Without  Radiology    Date/Time: 1/28/2025 11:30 AM    Performed by: Chon Winchester DO  Authorized by: Chon Winchester DO  Consent: Verbal consent obtained. Written consent obtained.  Risks and benefits: risks, benefits and alternatives were discussed  Consent given by: patient  Patient understanding: patient states understanding of the procedure being performed  Patient consent: the patient's understanding of the procedure matches consent given  Procedure consent: procedure consent matches procedure scheduled  Relevant documents: relevant documents present and verified  Test results: test results available and properly labeled  Site marked: the operative site was marked  Imaging studies: imaging studies available  Patient identity confirmed: verbally with patient  Procedure purpose: therapeutic and diagnostic  Indications: pleural effusion  Preparation: Patient was prepped and draped in the usual sterile fashion.  Local anesthesia used: yes  Anesthesia: local infiltration    Anesthesia:  Local anesthesia used: yes  Local Anesthetic: lidocaine 1% without epinephrine  Anesthetic total: 5 mL    Sedation:  Patient sedated: no    Preparation: skin prepped with ChloraPrep and sterile field established  Patient position: supported by bedside stand  Ultrasound guidance: yes  Location: right posterior  Puncture method: over-the-needle catheter  Number of attempts: 1  Drainage amount: 900 ml  Drainage characteristics: serous  Patient tolerance: patient tolerated the procedure well with no immediate complications  Chest x-ray performed: yes  Chest x-ray interpreted by me.

## 2025-01-28 NOTE — CODE DOCUMENTATION
Pt to 3w for bilateral thoracentesis. Consent signed, VSS, pt positioned at side of bed. 1.2L clear yellow fluid removed from Right side, pt allowed time to recover, approx 900ml clear yellow fluid removed from Left side. Pt tolerated well. Awaiting result from post procedure xray

## 2025-02-14 DIAGNOSIS — J90 RECURRENT PLEURAL EFFUSION ON LEFT: Primary | ICD-10-CM

## 2025-02-18 ENCOUNTER — HOSPITAL ENCOUNTER (OUTPATIENT)
Dept: INFUSION THERAPY | Facility: HOSPITAL | Age: 85
Discharge: HOME | End: 2025-02-18
Admitting: INTERNAL MEDICINE
Payer: MEDICARE

## 2025-02-18 ENCOUNTER — HOSPITAL ENCOUNTER (OUTPATIENT)
Dept: GENERAL RADIOLOGY | Facility: HOSPITAL | Age: 85
Discharge: HOME | End: 2025-02-18
Admitting: INTERNAL MEDICINE
Payer: MEDICARE

## 2025-02-18 VITALS
TEMPERATURE: 98.1 F | OXYGEN SATURATION: 100 % | HEART RATE: 66 BPM | SYSTOLIC BLOOD PRESSURE: 164 MMHG | DIASTOLIC BLOOD PRESSURE: 50 MMHG | RESPIRATION RATE: 16 BRPM

## 2025-02-18 PROCEDURE — 32550 INSERT PLEURAL CATH: CPT | Performed by: INTERNAL MEDICINE

## 2025-02-18 PROCEDURE — 76604 US EXAM CHEST: CPT | Performed by: INTERNAL MEDICINE

## 2025-02-18 PROCEDURE — 75989 ABSCESS DRAINAGE UNDER X-RAY: CPT

## 2025-02-18 PROCEDURE — 71045 X-RAY EXAM CHEST 1 VIEW: CPT

## 2025-02-18 NOTE — NURSING NOTE
Patient here for PleurX drain placement. Drained placed per Dr. Esparza. 500 cc fluid drained, line capped, dressing applied. Patient tolerated well. Awaiting chest x-ray for clearance for discharge.

## 2025-02-18 NOTE — PROCEDURES
PleurX chest tube placement procedure Note    Indication: Recurrent refractory pleural effusion.    Consent obtained: Yes, placed in chart.    Time Out: performed at bedside.    Pre-Medication: Local lidocaine     Procedure: The patient was placed in a right lateral decubitus position. Ultrasound was used to  visualize the fluid pocket in left postero-lateral chest. Site was marked for incisions. The site were prepped with chlorprep and draped in sterile fashion. Local anesthesia over the insertion site was applied with 1% lidocaine. An 0.5 cm incision was made in the 6th rib space along the posterior axillary line. Needle was inserted with drainage of clear yellow fluid. Guide wire was inserted.  5 cm anterior and inferior to the initial incision, 0.5 cm incision was made. Hemostat was used to dilate the channel, and tunnel the space. Tunnel trochar was used to attach to pleurX chest tube, and was fed from lower incision site to upper incision site. The catheter was fed till the middle portion sat in between the two incision points. The site with guide wire was dilated with dilators serially, and feeding catheter was introduced. PleurX catheter was introduced as the catheter and guide wire was removed. The pleurX catheter was kept in place and attached to suction with drainage of 500 cc fluid.  Initial output from the tube was 500 cc. The tube was sutured and secured in place with suture site was covered with an occlusive dressing.   Chest x-ray is obtained to evaluate placement which is pending at this time.    3 PleurX drainage kits were given to patients family for drainage at home.      The patient tolerated the procedure well.    Complications: None.    Suture removal in a week.

## 2025-02-21 DIAGNOSIS — J90 RECURRENT PLEURAL EFFUSION ON LEFT: Primary | ICD-10-CM

## 2025-02-21 DIAGNOSIS — Z51.89 VISIT FOR WOUND CARE: ICD-10-CM

## 2025-02-24 ENCOUNTER — TRANSCRIBE ORDERS (OUTPATIENT)
Dept: ADMINISTRATIVE | Facility: HOSPITAL | Age: 85
End: 2025-02-24
Payer: MEDICARE

## 2025-02-24 ENCOUNTER — LAB (OUTPATIENT)
Dept: LAB | Facility: HOSPITAL | Age: 85
End: 2025-02-24
Payer: MEDICARE

## 2025-02-24 DIAGNOSIS — N18.4 CHRONIC KIDNEY DISEASE, STAGE IV (SEVERE): Primary | ICD-10-CM

## 2025-02-24 DIAGNOSIS — N18.4 CHRONIC KIDNEY DISEASE, STAGE IV (SEVERE): ICD-10-CM

## 2025-02-24 LAB
ALBUMIN SERPL-MCNC: 3.4 G/DL (ref 3.5–5.2)
ANION GAP SERPL CALCULATED.3IONS-SCNC: 11.8 MMOL/L (ref 5–15)
BASOPHILS # BLD AUTO: 0.07 10*3/MM3 (ref 0–0.2)
BASOPHILS NFR BLD AUTO: 0.8 % (ref 0–1.5)
BUN SERPL-MCNC: 50 MG/DL (ref 8–23)
BUN/CREAT SERPL: 9 (ref 7–25)
CALCIUM SPEC-SCNC: 9.2 MG/DL (ref 8.6–10.5)
CHLORIDE SERPL-SCNC: 106 MMOL/L (ref 98–107)
CO2 SERPL-SCNC: 25.2 MMOL/L (ref 22–29)
CREAT SERPL-MCNC: 5.54 MG/DL (ref 0.76–1.27)
DEPRECATED RDW RBC AUTO: 42.6 FL (ref 37–54)
EGFRCR SERPLBLD CKD-EPI 2021: 9.6 ML/MIN/1.73
EOSINOPHIL # BLD AUTO: 0.4 10*3/MM3 (ref 0–0.4)
EOSINOPHIL NFR BLD AUTO: 4.8 % (ref 0.3–6.2)
ERYTHROCYTE [DISTWIDTH] IN BLOOD BY AUTOMATED COUNT: 12.9 % (ref 12.3–15.4)
GLUCOSE SERPL-MCNC: 93 MG/DL (ref 65–99)
HCT VFR BLD AUTO: 26.9 % (ref 37.5–51)
HGB BLD-MCNC: 9 G/DL (ref 13–17.7)
IMM GRANULOCYTES # BLD AUTO: 0.04 10*3/MM3 (ref 0–0.05)
IMM GRANULOCYTES NFR BLD AUTO: 0.5 % (ref 0–0.5)
LYMPHOCYTES # BLD AUTO: 0.95 10*3/MM3 (ref 0.7–3.1)
LYMPHOCYTES NFR BLD AUTO: 11.4 % (ref 19.6–45.3)
MCH RBC QN AUTO: 30.8 PG (ref 26.6–33)
MCHC RBC AUTO-ENTMCNC: 33.5 G/DL (ref 31.5–35.7)
MCV RBC AUTO: 92.1 FL (ref 79–97)
MONOCYTES # BLD AUTO: 0.52 10*3/MM3 (ref 0.1–0.9)
MONOCYTES NFR BLD AUTO: 6.2 % (ref 5–12)
NEUTROPHILS NFR BLD AUTO: 6.35 10*3/MM3 (ref 1.7–7)
NEUTROPHILS NFR BLD AUTO: 76.3 % (ref 42.7–76)
NRBC BLD AUTO-RTO: 0 /100 WBC (ref 0–0.2)
PHOSPHATE SERPL-MCNC: 4.6 MG/DL (ref 2.5–4.5)
PLATELET # BLD AUTO: 289 10*3/MM3 (ref 140–450)
PMV BLD AUTO: 9.6 FL (ref 6–12)
POTASSIUM SERPL-SCNC: 4.4 MMOL/L (ref 3.5–5.2)
PTH-INTACT SERPL-MCNC: 99.5 PG/ML (ref 15–65)
RBC # BLD AUTO: 2.92 10*6/MM3 (ref 4.14–5.8)
SODIUM SERPL-SCNC: 143 MMOL/L (ref 136–145)
WBC NRBC COR # BLD AUTO: 8.33 10*3/MM3 (ref 3.4–10.8)

## 2025-02-24 PROCEDURE — 85025 COMPLETE CBC W/AUTO DIFF WBC: CPT

## 2025-02-24 PROCEDURE — 36415 COLL VENOUS BLD VENIPUNCTURE: CPT

## 2025-02-24 PROCEDURE — 83970 ASSAY OF PARATHORMONE: CPT

## 2025-02-24 PROCEDURE — 80069 RENAL FUNCTION PANEL: CPT

## 2025-02-25 ENCOUNTER — TELEPHONE (OUTPATIENT)
Dept: PULMONOLOGY | Facility: CLINIC | Age: 85
End: 2025-02-25
Payer: MEDICARE

## 2025-02-25 NOTE — TELEPHONE ENCOUNTER
Spoke with patient's daughter Candida.  Home health is scheduled to visit his home today.  Daughter states patient fell at home the other day - unsure if it was Saturday or Sunday and they are unsure if the pleurx catheter was disrupted.  RN will notify home health so Home health RN is aware before assessment.

## 2025-03-03 ENCOUNTER — TELEPHONE (OUTPATIENT)
Dept: PULMONOLOGY | Facility: CLINIC | Age: 85
End: 2025-03-03

## 2025-03-03 NOTE — TELEPHONE ENCOUNTER
Caller: RYLAND DENG    Relationship to patient: Emergency Contact    Best call back number: [phone]     Patient is needing: PT SUPPLIES FOR PLEUR X ARE PROHIBITIVELY EXPENSIVE, REQUESTS INFORMATION FOR PATIENT ASSISTANCE. PLEASE CALL TO ADVISE.

## 2025-03-06 NOTE — TELEPHONE ENCOUNTER
Spoke with Candida.  Hospice will be visiting the home tomorrow 03/07/2025 for assessment and evaluation.  If they do not admit patient, she will call back to discuss options for pleurx bottles.